# Patient Record
Sex: FEMALE | Race: WHITE | NOT HISPANIC OR LATINO | Employment: OTHER | URBAN - METROPOLITAN AREA
[De-identification: names, ages, dates, MRNs, and addresses within clinical notes are randomized per-mention and may not be internally consistent; named-entity substitution may affect disease eponyms.]

---

## 2018-01-30 ENCOUNTER — CONVERSION ENCOUNTER (OUTPATIENT)
Dept: MAMMOGRAPHY | Facility: HOSPITAL | Age: 78
End: 2018-01-30

## 2018-02-14 ENCOUNTER — OFFICE VISIT CONVERTED (OUTPATIENT)
Dept: OTHER | Facility: HOSPITAL | Age: 78
End: 2018-02-14
Attending: INTERNAL MEDICINE

## 2018-04-20 ENCOUNTER — OFFICE VISIT CONVERTED (OUTPATIENT)
Dept: FAMILY MEDICINE CLINIC | Facility: CLINIC | Age: 78
End: 2018-04-20
Attending: NURSE PRACTITIONER

## 2018-04-20 ENCOUNTER — CONVERSION ENCOUNTER (OUTPATIENT)
Dept: FAMILY MEDICINE CLINIC | Facility: CLINIC | Age: 78
End: 2018-04-20

## 2018-05-18 ENCOUNTER — OFFICE VISIT CONVERTED (OUTPATIENT)
Dept: FAMILY MEDICINE CLINIC | Facility: CLINIC | Age: 78
End: 2018-05-18
Attending: NURSE PRACTITIONER

## 2018-06-25 ENCOUNTER — CONVERSION ENCOUNTER (OUTPATIENT)
Dept: MAMMOGRAPHY | Facility: HOSPITAL | Age: 78
End: 2018-06-25

## 2018-06-25 ENCOUNTER — OFFICE VISIT CONVERTED (OUTPATIENT)
Dept: OTHER | Facility: HOSPITAL | Age: 78
End: 2018-06-25
Attending: INTERNAL MEDICINE

## 2018-07-10 ENCOUNTER — OFFICE VISIT CONVERTED (OUTPATIENT)
Dept: FAMILY MEDICINE CLINIC | Facility: CLINIC | Age: 78
End: 2018-07-10
Attending: NURSE PRACTITIONER

## 2018-10-03 ENCOUNTER — OFFICE VISIT CONVERTED (OUTPATIENT)
Dept: FAMILY MEDICINE CLINIC | Facility: CLINIC | Age: 78
End: 2018-10-03
Attending: NURSE PRACTITIONER

## 2018-12-13 ENCOUNTER — OFFICE VISIT CONVERTED (OUTPATIENT)
Dept: FAMILY MEDICINE CLINIC | Facility: CLINIC | Age: 78
End: 2018-12-13
Attending: NURSE PRACTITIONER

## 2019-01-14 ENCOUNTER — OFFICE VISIT CONVERTED (OUTPATIENT)
Dept: FAMILY MEDICINE CLINIC | Facility: CLINIC | Age: 79
End: 2019-01-14
Attending: NURSE PRACTITIONER

## 2019-03-04 ENCOUNTER — OFFICE VISIT CONVERTED (OUTPATIENT)
Dept: OTHER | Facility: HOSPITAL | Age: 79
End: 2019-03-04
Attending: INTERNAL MEDICINE

## 2019-03-04 ENCOUNTER — HOSPITAL ENCOUNTER (OUTPATIENT)
Dept: OTHER | Facility: HOSPITAL | Age: 79
Discharge: HOME | End: 2019-03-04
Attending: INTERNAL MEDICINE

## 2019-03-04 LAB
ALBUMIN SERPL-MCNC: 4.2 G/DL (ref 3.5–5)
ALBUMIN/GLOB SERPL: 1.2 {RATIO} (ref 1.4–2.6)
ALP SERPL-CCNC: 72 U/L (ref 43–160)
ALT SERPL-CCNC: 10 U/L (ref 10–40)
ANION GAP SERPL CALC-SCNC: 13 MMOL/L (ref 8–19)
AST SERPL-CCNC: 19 U/L (ref 15–50)
BASOPHILS # BLD AUTO: 0.02 10*3/UL (ref 0–0.2)
BASOPHILS NFR BLD AUTO: 0.3 % (ref 0–3)
BILIRUB SERPL-MCNC: 0.41 MG/DL (ref 0.2–1.3)
BUN SERPL-MCNC: 13 MG/DL (ref 5–25)
BUN/CREAT SERPL: 17 {RATIO} (ref 6–20)
CALCIUM SERPL-MCNC: 8.9 MG/DL (ref 8.7–10.4)
CHLORIDE SERPL-SCNC: 99 MMOL/L (ref 99–111)
CONV ABS IMM GRAN: 0.01 10*3/UL (ref 0–0.2)
CONV CO2: 29 MMOL/L (ref 22–32)
CONV IMMATURE GRAN: 0.2 % (ref 0–1.8)
CONV TOTAL PROTEIN: 7.7 G/DL (ref 6.3–8.2)
CREAT UR-MCNC: 0.78 MG/DL (ref 0.5–0.9)
DEPRECATED RDW RBC AUTO: 48.5 FL (ref 36.4–46.3)
EOSINOPHIL # BLD AUTO: 0.17 10*3/UL (ref 0–0.7)
EOSINOPHIL # BLD AUTO: 2.8 % (ref 0–7)
ERYTHROCYTE [DISTWIDTH] IN BLOOD BY AUTOMATED COUNT: 13.5 % (ref 11.7–14.4)
GFR SERPLBLD BASED ON 1.73 SQ M-ARVRAT: >60 ML/MIN/{1.73_M2}
GLOBULIN UR ELPH-MCNC: 3.5 G/DL (ref 2–3.5)
GLUCOSE SERPL-MCNC: 87 MG/DL (ref 65–99)
HBA1C MFR BLD: 12.2 G/DL (ref 12–16)
HCT VFR BLD AUTO: 38 % (ref 37–47)
LYMPHOCYTES # BLD AUTO: 1.86 10*3/UL (ref 1–5)
MCH RBC QN AUTO: 31.2 PG (ref 27–31)
MCHC RBC AUTO-ENTMCNC: 32.1 G/DL (ref 33–37)
MCV RBC AUTO: 97.2 FL (ref 81–99)
MONOCYTES # BLD AUTO: 0.65 10*3/UL (ref 0.2–1.2)
MONOCYTES NFR BLD AUTO: 10.6 % (ref 3–10)
NEUTROPHILS # BLD AUTO: 3.41 10*3/UL (ref 2–8)
NEUTROPHILS NFR BLD AUTO: 55.7 % (ref 30–85)
NRBC CBCN: 0 % (ref 0–0.7)
OSMOLALITY SERPL CALC.SUM OF ELEC: 283 MOSM/KG (ref 273–304)
PLATELET # BLD AUTO: 251 10*3/UL (ref 130–400)
PMV BLD AUTO: 9.8 FL (ref 9.4–12.3)
POTASSIUM SERPL-SCNC: 4.4 MMOL/L (ref 3.5–5.3)
RBC # BLD AUTO: 3.91 10*6/UL (ref 4.2–5.4)
SODIUM SERPL-SCNC: 137 MMOL/L (ref 135–147)
VARIANT LYMPHS NFR BLD MANUAL: 30.4 % (ref 20–45)
WBC # BLD AUTO: 6.12 10*3/UL (ref 4.8–10.8)

## 2019-03-05 LAB — CANCER AG27-29 SERPL-ACNC: 17.6 U/ML (ref 0–38.6)

## 2019-04-01 ENCOUNTER — OFFICE VISIT CONVERTED (OUTPATIENT)
Dept: FAMILY MEDICINE CLINIC | Facility: CLINIC | Age: 79
End: 2019-04-01
Attending: NURSE PRACTITIONER

## 2019-04-01 ENCOUNTER — HOSPITAL ENCOUNTER (OUTPATIENT)
Dept: FAMILY MEDICINE CLINIC | Facility: CLINIC | Age: 79
Discharge: HOME | End: 2019-04-01
Attending: NURSE PRACTITIONER

## 2019-04-01 LAB
ALBUMIN SERPL-MCNC: 4.4 G/DL (ref 3.5–5)
ALBUMIN/GLOB SERPL: 1.2 {RATIO} (ref 1.4–2.6)
ALP SERPL-CCNC: 83 U/L (ref 43–160)
ALT SERPL-CCNC: 10 U/L (ref 10–40)
ANION GAP SERPL CALC-SCNC: 18 MMOL/L (ref 8–19)
APPEARANCE UR: CLEAR
AST SERPL-CCNC: 24 U/L (ref 15–50)
BASOPHILS # BLD AUTO: 0.03 10*3/UL (ref 0–0.2)
BASOPHILS NFR BLD AUTO: 0.4 % (ref 0–3)
BILIRUB SERPL-MCNC: 0.51 MG/DL (ref 0.2–1.3)
BILIRUB UR QL: NEGATIVE
BUN SERPL-MCNC: 14 MG/DL (ref 5–25)
BUN/CREAT SERPL: 15 {RATIO} (ref 6–20)
CALCIUM SERPL-MCNC: 9.5 MG/DL (ref 8.7–10.4)
CHLORIDE SERPL-SCNC: 100 MMOL/L (ref 99–111)
CHOLEST SERPL-MCNC: 186 MG/DL (ref 107–200)
CHOLEST/HDLC SERPL: 4 {RATIO} (ref 3–6)
COLOR UR: YELLOW
CONV ABS IMM GRAN: 0.02 10*3/UL (ref 0–0.2)
CONV BACTERIA: NEGATIVE
CONV CO2: 26 MMOL/L (ref 22–32)
CONV COLLECTION SOURCE (UA): ABNORMAL
CONV HYALINE CASTS IN URINE MICRO: ABNORMAL /[LPF]
CONV IMMATURE GRAN: 0.3 % (ref 0–1.8)
CONV TOTAL PROTEIN: 8.2 G/DL (ref 6.3–8.2)
CONV UROBILINOGEN IN URINE BY AUTOMATED TEST STRIP: 1 {EHRLICHU}/DL (ref 0.1–1)
CREAT UR-MCNC: 0.92 MG/DL (ref 0.5–0.9)
DEPRECATED RDW RBC AUTO: 49.8 FL (ref 36.4–46.3)
EOSINOPHIL # BLD AUTO: 0.18 10*3/UL (ref 0–0.7)
EOSINOPHIL # BLD AUTO: 2.6 % (ref 0–7)
ERYTHROCYTE [DISTWIDTH] IN BLOOD BY AUTOMATED COUNT: 13.5 % (ref 11.7–14.4)
GFR SERPLBLD BASED ON 1.73 SQ M-ARVRAT: >60 ML/MIN/{1.73_M2}
GLOBULIN UR ELPH-MCNC: 3.8 G/DL (ref 2–3.5)
GLUCOSE SERPL-MCNC: 92 MG/DL (ref 65–99)
GLUCOSE UR QL: NEGATIVE MG/DL
HBA1C MFR BLD: 12.4 G/DL (ref 12–16)
HCT VFR BLD AUTO: 40.2 % (ref 37–47)
HDLC SERPL-MCNC: 47 MG/DL (ref 40–60)
HGB UR QL STRIP: NEGATIVE
KETONES UR QL STRIP: NEGATIVE MG/DL
LDLC SERPL CALC-MCNC: 116 MG/DL (ref 70–100)
LEUKOCYTE ESTERASE UR QL STRIP: ABNORMAL
LYMPHOCYTES # BLD AUTO: 2.48 10*3/UL (ref 1–5)
MCH RBC QN AUTO: 30.8 PG (ref 27–31)
MCHC RBC AUTO-ENTMCNC: 30.8 G/DL (ref 33–37)
MCV RBC AUTO: 99.8 FL (ref 81–99)
MONOCYTES # BLD AUTO: 0.58 10*3/UL (ref 0.2–1.2)
MONOCYTES NFR BLD AUTO: 8.4 % (ref 3–10)
NEUTROPHILS # BLD AUTO: 3.62 10*3/UL (ref 2–8)
NEUTROPHILS NFR BLD AUTO: 52.4 % (ref 30–85)
NITRITE UR QL STRIP: NEGATIVE
NRBC CBCN: 0 % (ref 0–0.7)
OSMOLALITY SERPL CALC.SUM OF ELEC: 288 MOSM/KG (ref 273–304)
PH UR STRIP.AUTO: 7 [PH] (ref 5–8)
PLATELET # BLD AUTO: 308 10*3/UL (ref 130–400)
PMV BLD AUTO: 10.3 FL (ref 9.4–12.3)
POTASSIUM SERPL-SCNC: 4.7 MMOL/L (ref 3.5–5.3)
PROT UR QL: NEGATIVE MG/DL
RBC # BLD AUTO: 4.03 10*6/UL (ref 4.2–5.4)
RBC #/AREA URNS HPF: ABNORMAL /[HPF]
SODIUM SERPL-SCNC: 139 MMOL/L (ref 135–147)
SP GR UR: 1.02 (ref 1–1.03)
TRIGL SERPL-MCNC: 116 MG/DL (ref 40–150)
VARIANT LYMPHS NFR BLD MANUAL: 35.9 % (ref 20–45)
VLDLC SERPL-MCNC: 23 MG/DL (ref 5–37)
WBC # BLD AUTO: 6.91 10*3/UL (ref 4.8–10.8)
WBC #/AREA URNS HPF: ABNORMAL /[HPF]

## 2019-05-06 ENCOUNTER — HOSPITAL ENCOUNTER (OUTPATIENT)
Dept: ONCOLOGY | Facility: HOSPITAL | Age: 79
Discharge: HOME | End: 2019-05-06
Attending: INTERNAL MEDICINE

## 2019-05-06 LAB
CALCIUM SERPL-MCNC: 9.5 MG/DL (ref 8.7–10.4)
CREAT UR-MCNC: 0.9 MG/DL (ref 0.5–0.9)
MAGNESIUM SERPL-MCNC: 1.99 MG/DL (ref 1.6–2.3)
PHOSPHATE SERPL-MCNC: 3.1 MG/DL (ref 2.4–4.5)

## 2019-05-09 ENCOUNTER — HOSPITAL ENCOUNTER (OUTPATIENT)
Dept: MAMMOGRAPHY | Facility: HOSPITAL | Age: 79
Discharge: HOME | End: 2019-05-09
Attending: INTERNAL MEDICINE

## 2019-09-03 ENCOUNTER — HOSPITAL ENCOUNTER (OUTPATIENT)
Dept: OTHER | Facility: HOSPITAL | Age: 79
Discharge: HOME | End: 2019-09-03
Attending: INTERNAL MEDICINE

## 2019-09-03 ENCOUNTER — OFFICE VISIT CONVERTED (OUTPATIENT)
Dept: OTHER | Facility: HOSPITAL | Age: 79
End: 2019-09-03
Attending: INTERNAL MEDICINE

## 2019-09-03 LAB
ALBUMIN SERPL-MCNC: 4.2 G/DL (ref 3.5–5)
ALBUMIN/GLOB SERPL: 1.2 {RATIO} (ref 1.4–2.6)
ALP SERPL-CCNC: 78 U/L (ref 43–160)
ALT SERPL-CCNC: 8 U/L (ref 10–40)
ANION GAP SERPL CALC-SCNC: 18 MMOL/L (ref 8–19)
AST SERPL-CCNC: 18 U/L (ref 15–50)
BASOPHILS # BLD AUTO: 0.02 10*3/UL (ref 0–0.2)
BASOPHILS NFR BLD AUTO: 0.3 % (ref 0–3)
BILIRUB SERPL-MCNC: 0.35 MG/DL (ref 0.2–1.3)
BUN SERPL-MCNC: 17 MG/DL (ref 5–25)
BUN/CREAT SERPL: 18 {RATIO} (ref 6–20)
CALCIUM SERPL-MCNC: 9.2 MG/DL (ref 8.7–10.4)
CHLORIDE SERPL-SCNC: 100 MMOL/L (ref 99–111)
CONV ABS IMM GRAN: 0.02 10*3/UL (ref 0–0.2)
CONV CO2: 24 MMOL/L (ref 22–32)
CONV IMMATURE GRAN: 0.3 % (ref 0–1.8)
CONV TOTAL PROTEIN: 7.7 G/DL (ref 6.3–8.2)
CREAT UR-MCNC: 0.94 MG/DL (ref 0.5–0.9)
DEPRECATED RDW RBC AUTO: 48.8 FL (ref 36.4–46.3)
EOSINOPHIL # BLD AUTO: 0.23 10*3/UL (ref 0–0.7)
EOSINOPHIL # BLD AUTO: 3.6 % (ref 0–7)
ERYTHROCYTE [DISTWIDTH] IN BLOOD BY AUTOMATED COUNT: 13.4 % (ref 11.7–14.4)
GFR SERPLBLD BASED ON 1.73 SQ M-ARVRAT: 58 ML/MIN/{1.73_M2}
GLOBULIN UR ELPH-MCNC: 3.5 G/DL (ref 2–3.5)
GLUCOSE SERPL-MCNC: 110 MG/DL (ref 65–99)
HCT VFR BLD AUTO: 36 % (ref 37–47)
HGB BLD-MCNC: 11.4 G/DL (ref 12–16)
LYMPHOCYTES # BLD AUTO: 2.02 10*3/UL (ref 1–5)
LYMPHOCYTES NFR BLD AUTO: 31.8 % (ref 20–45)
MCH RBC QN AUTO: 31.2 PG (ref 27–31)
MCHC RBC AUTO-ENTMCNC: 31.7 G/DL (ref 33–37)
MCV RBC AUTO: 98.6 FL (ref 81–99)
MONOCYTES # BLD AUTO: 0.73 10*3/UL (ref 0.2–1.2)
MONOCYTES NFR BLD AUTO: 11.5 % (ref 3–10)
NEUTROPHILS # BLD AUTO: 3.33 10*3/UL (ref 2–8)
NEUTROPHILS NFR BLD AUTO: 52.5 % (ref 30–85)
NRBC CBCN: 0 % (ref 0–0.7)
OSMOLALITY SERPL CALC.SUM OF ELEC: 288 MOSM/KG (ref 273–304)
PLATELET # BLD AUTO: 249 10*3/UL (ref 130–400)
PMV BLD AUTO: 10.1 FL (ref 9.4–12.3)
POTASSIUM SERPL-SCNC: 4.1 MMOL/L (ref 3.5–5.3)
RBC # BLD AUTO: 3.65 10*6/UL (ref 4.2–5.4)
SODIUM SERPL-SCNC: 138 MMOL/L (ref 135–147)
WBC # BLD AUTO: 6.35 10*3/UL (ref 4.8–10.8)

## 2019-09-04 LAB — CANCER AG27-29 SERPL-ACNC: 17.6 U/ML (ref 0–38.6)

## 2019-09-26 ENCOUNTER — HOSPITAL ENCOUNTER (OUTPATIENT)
Dept: FAMILY MEDICINE CLINIC | Facility: CLINIC | Age: 79
Discharge: HOME | End: 2019-09-26
Attending: NURSE PRACTITIONER

## 2019-09-26 ENCOUNTER — OFFICE VISIT CONVERTED (OUTPATIENT)
Dept: FAMILY MEDICINE CLINIC | Facility: CLINIC | Age: 79
End: 2019-09-26
Attending: NURSE PRACTITIONER

## 2019-09-26 LAB
ALBUMIN SERPL-MCNC: 4.5 G/DL (ref 3.5–5)
ALBUMIN/GLOB SERPL: 1.3 {RATIO} (ref 1.4–2.6)
ALP SERPL-CCNC: 79 U/L (ref 43–160)
ALT SERPL-CCNC: 8 U/L (ref 10–40)
ANION GAP SERPL CALC-SCNC: 22 MMOL/L (ref 8–19)
APPEARANCE UR: CLEAR
AST SERPL-CCNC: 22 U/L (ref 15–50)
BILIRUB SERPL-MCNC: 0.58 MG/DL (ref 0.2–1.3)
BILIRUB UR QL: NEGATIVE
BUN SERPL-MCNC: 15 MG/DL (ref 5–25)
BUN/CREAT SERPL: 17 {RATIO} (ref 6–20)
CALCIUM SERPL-MCNC: 9.7 MG/DL (ref 8.7–10.4)
CHLORIDE SERPL-SCNC: 93 MMOL/L (ref 99–111)
CHOLEST SERPL-MCNC: 172 MG/DL (ref 107–200)
CHOLEST/HDLC SERPL: 4 {RATIO} (ref 3–6)
COLOR UR: YELLOW
CONV BACTERIA: NEGATIVE
CONV CO2: 23 MMOL/L (ref 22–32)
CONV COLLECTION SOURCE (UA): ABNORMAL
CONV TOTAL PROTEIN: 8 G/DL (ref 6.3–8.2)
CONV UROBILINOGEN IN URINE BY AUTOMATED TEST STRIP: 0.2 {EHRLICHU}/DL (ref 0.1–1)
CREAT UR-MCNC: 0.87 MG/DL (ref 0.5–0.9)
GFR SERPLBLD BASED ON 1.73 SQ M-ARVRAT: >60 ML/MIN/{1.73_M2}
GLOBULIN UR ELPH-MCNC: 3.5 G/DL (ref 2–3.5)
GLUCOSE SERPL-MCNC: 98 MG/DL (ref 65–99)
GLUCOSE UR QL: NEGATIVE MG/DL
HDLC SERPL-MCNC: 43 MG/DL (ref 40–60)
HGB UR QL STRIP: NEGATIVE
KETONES UR QL STRIP: NEGATIVE MG/DL
LDLC SERPL CALC-MCNC: 95 MG/DL (ref 70–100)
LEUKOCYTE ESTERASE UR QL STRIP: ABNORMAL
NITRITE UR QL STRIP: NEGATIVE
OSMOLALITY SERPL CALC.SUM OF ELEC: 277 MOSM/KG (ref 273–304)
PH UR STRIP.AUTO: 8 [PH] (ref 5–8)
POTASSIUM SERPL-SCNC: 4.9 MMOL/L (ref 3.5–5.3)
PROT UR QL: NEGATIVE MG/DL
RBC #/AREA URNS HPF: ABNORMAL /[HPF]
SODIUM SERPL-SCNC: 133 MMOL/L (ref 135–147)
SP GR UR: 1.01 (ref 1–1.03)
TRIGL SERPL-MCNC: 170 MG/DL (ref 40–150)
TSH SERPL-ACNC: 2.61 M[IU]/L (ref 0.27–4.2)
VLDLC SERPL-MCNC: 34 MG/DL (ref 5–37)
WBC #/AREA URNS HPF: ABNORMAL /[HPF]

## 2019-11-07 ENCOUNTER — HOSPITAL ENCOUNTER (OUTPATIENT)
Dept: ONCOLOGY | Facility: HOSPITAL | Age: 79
Discharge: HOME | End: 2019-11-07
Attending: INTERNAL MEDICINE

## 2019-11-07 LAB
ALBUMIN SERPL-MCNC: 4.2 G/DL (ref 3.5–5)
ALBUMIN/GLOB SERPL: 1.2 {RATIO} (ref 1.4–2.6)
ALP SERPL-CCNC: 74 U/L (ref 43–160)
ALT SERPL-CCNC: 10 U/L (ref 10–40)
ANION GAP SERPL CALC-SCNC: 17 MMOL/L (ref 8–19)
AST SERPL-CCNC: 23 U/L (ref 15–50)
BASOPHILS # BLD AUTO: 0.02 10*3/UL (ref 0–0.2)
BASOPHILS NFR BLD AUTO: 0.4 % (ref 0–3)
BILIRUB SERPL-MCNC: 0.3 MG/DL (ref 0.2–1.3)
BUN SERPL-MCNC: 15 MG/DL (ref 5–25)
BUN/CREAT SERPL: 17 {RATIO} (ref 6–20)
CALCIUM SERPL-MCNC: 9.7 MG/DL (ref 8.7–10.4)
CHLORIDE SERPL-SCNC: 97 MMOL/L (ref 99–111)
CONV ABS IMM GRAN: 0.01 10*3/UL (ref 0–0.2)
CONV CO2: 26 MMOL/L (ref 22–32)
CONV IMMATURE GRAN: 0.2 % (ref 0–1.8)
CONV TOTAL PROTEIN: 7.8 G/DL (ref 6.3–8.2)
CREAT UR-MCNC: 0.89 MG/DL (ref 0.5–0.9)
DEPRECATED RDW RBC AUTO: 47.5 FL (ref 36.4–46.3)
EOSINOPHIL # BLD AUTO: 0.18 10*3/UL (ref 0–0.7)
EOSINOPHIL # BLD AUTO: 3.3 % (ref 0–7)
ERYTHROCYTE [DISTWIDTH] IN BLOOD BY AUTOMATED COUNT: 13.2 % (ref 11.7–14.4)
GFR SERPLBLD BASED ON 1.73 SQ M-ARVRAT: >60 ML/MIN/{1.73_M2}
GLOBULIN UR ELPH-MCNC: 3.6 G/DL (ref 2–3.5)
GLUCOSE SERPL-MCNC: 140 MG/DL (ref 65–99)
HCT VFR BLD AUTO: 37.2 % (ref 37–47)
HGB BLD-MCNC: 11.6 G/DL (ref 12–16)
LYMPHOCYTES # BLD AUTO: 1.82 10*3/UL (ref 1–5)
LYMPHOCYTES NFR BLD AUTO: 33.8 % (ref 20–45)
MAGNESIUM SERPL-MCNC: 2.12 MG/DL (ref 1.6–2.3)
MCH RBC QN AUTO: 30.8 PG (ref 27–31)
MCHC RBC AUTO-ENTMCNC: 31.2 G/DL (ref 33–37)
MCV RBC AUTO: 98.7 FL (ref 81–99)
MONOCYTES # BLD AUTO: 0.61 10*3/UL (ref 0.2–1.2)
MONOCYTES NFR BLD AUTO: 11.3 % (ref 3–10)
NEUTROPHILS # BLD AUTO: 2.75 10*3/UL (ref 2–8)
NEUTROPHILS NFR BLD AUTO: 51 % (ref 30–85)
NRBC CBCN: 0 % (ref 0–0.7)
OSMOLALITY SERPL CALC.SUM OF ELEC: 283 MOSM/KG (ref 273–304)
PHOSPHATE SERPL-MCNC: 3.3 MG/DL (ref 2.4–4.5)
PLATELET # BLD AUTO: 258 10*3/UL (ref 130–400)
PMV BLD AUTO: 10 FL (ref 9.4–12.3)
POTASSIUM SERPL-SCNC: 4.5 MMOL/L (ref 3.5–5.3)
RBC # BLD AUTO: 3.77 10*6/UL (ref 4.2–5.4)
SODIUM SERPL-SCNC: 135 MMOL/L (ref 135–147)
WBC # BLD AUTO: 5.39 10*3/UL (ref 4.8–10.8)

## 2020-05-01 ENCOUNTER — OFFICE VISIT CONVERTED (OUTPATIENT)
Dept: FAMILY MEDICINE CLINIC | Facility: CLINIC | Age: 80
End: 2020-05-01
Attending: FAMILY MEDICINE

## 2020-05-08 ENCOUNTER — HOSPITAL ENCOUNTER (OUTPATIENT)
Dept: OTHER | Facility: HOSPITAL | Age: 80
Discharge: HOME | End: 2020-05-08
Attending: INTERNAL MEDICINE

## 2020-05-08 LAB
ALBUMIN SERPL-MCNC: 4.1 G/DL (ref 3.5–5)
ALBUMIN/GLOB SERPL: 1.1 {RATIO} (ref 1.4–2.6)
ALP SERPL-CCNC: 83 U/L (ref 43–160)
ALT SERPL-CCNC: 7 U/L (ref 10–40)
ANION GAP SERPL CALC-SCNC: 16 MMOL/L (ref 8–19)
AST SERPL-CCNC: 18 U/L (ref 15–50)
BASOPHILS # BLD AUTO: 0.03 10*3/UL (ref 0–0.2)
BASOPHILS NFR BLD AUTO: 0.4 % (ref 0–3)
BILIRUB SERPL-MCNC: 0.56 MG/DL (ref 0.2–1.3)
BUN SERPL-MCNC: 15 MG/DL (ref 5–25)
BUN/CREAT SERPL: 19 {RATIO} (ref 6–20)
CALCIUM SERPL-MCNC: 9.7 MG/DL (ref 8.7–10.4)
CHLORIDE SERPL-SCNC: 95 MMOL/L (ref 99–111)
CONV ABS IMM GRAN: 0.02 10*3/UL (ref 0–0.2)
CONV CO2: 28 MMOL/L (ref 22–32)
CONV IMMATURE GRAN: 0.3 % (ref 0–1.8)
CONV TOTAL PROTEIN: 7.9 G/DL (ref 6.3–8.2)
CREAT UR-MCNC: 0.81 MG/DL (ref 0.5–0.9)
DEPRECATED RDW RBC AUTO: 48.1 FL (ref 36.4–46.3)
EOSINOPHIL # BLD AUTO: 0.22 10*3/UL (ref 0–0.7)
EOSINOPHIL # BLD AUTO: 3 % (ref 0–7)
ERYTHROCYTE [DISTWIDTH] IN BLOOD BY AUTOMATED COUNT: 13.5 % (ref 11.7–14.4)
GFR SERPLBLD BASED ON 1.73 SQ M-ARVRAT: >60 ML/MIN/{1.73_M2}
GLOBULIN UR ELPH-MCNC: 3.8 G/DL (ref 2–3.5)
GLUCOSE SERPL-MCNC: 98 MG/DL (ref 65–99)
HCT VFR BLD AUTO: 37.4 % (ref 37–47)
HGB BLD-MCNC: 11.9 G/DL (ref 12–16)
LYMPHOCYTES # BLD AUTO: 2.29 10*3/UL (ref 1–5)
LYMPHOCYTES NFR BLD AUTO: 31.3 % (ref 20–45)
MAGNESIUM SERPL-MCNC: 2.03 MG/DL (ref 1.6–2.3)
MCH RBC QN AUTO: 30.7 PG (ref 27–31)
MCHC RBC AUTO-ENTMCNC: 31.8 G/DL (ref 33–37)
MCV RBC AUTO: 96.4 FL (ref 81–99)
MONOCYTES # BLD AUTO: 0.66 10*3/UL (ref 0.2–1.2)
MONOCYTES NFR BLD AUTO: 9 % (ref 3–10)
NEUTROPHILS # BLD AUTO: 4.09 10*3/UL (ref 2–8)
NEUTROPHILS NFR BLD AUTO: 56 % (ref 30–85)
NRBC CBCN: 0 % (ref 0–0.7)
OSMOLALITY SERPL CALC.SUM OF ELEC: 281 MOSM/KG (ref 273–304)
PHOSPHATE SERPL-MCNC: 3.6 MG/DL (ref 2.4–4.5)
PLATELET # BLD AUTO: 276 10*3/UL (ref 130–400)
PMV BLD AUTO: 9.7 FL (ref 9.4–12.3)
POTASSIUM SERPL-SCNC: 4.2 MMOL/L (ref 3.5–5.3)
RBC # BLD AUTO: 3.88 10*6/UL (ref 4.2–5.4)
SODIUM SERPL-SCNC: 135 MMOL/L (ref 135–147)
WBC # BLD AUTO: 7.31 10*3/UL (ref 4.8–10.8)

## 2020-06-15 ENCOUNTER — OFFICE VISIT CONVERTED (OUTPATIENT)
Dept: OTHER | Facility: HOSPITAL | Age: 80
End: 2020-06-15
Attending: INTERNAL MEDICINE

## 2020-06-19 ENCOUNTER — HOSPITAL ENCOUNTER (OUTPATIENT)
Dept: FAMILY MEDICINE CLINIC | Facility: CLINIC | Age: 80
Discharge: HOME | End: 2020-06-19
Attending: NURSE PRACTITIONER

## 2020-06-19 ENCOUNTER — OFFICE VISIT CONVERTED (OUTPATIENT)
Dept: FAMILY MEDICINE CLINIC | Facility: CLINIC | Age: 80
End: 2020-06-19
Attending: NURSE PRACTITIONER

## 2020-06-19 ENCOUNTER — CONVERSION ENCOUNTER (OUTPATIENT)
Dept: FAMILY MEDICINE CLINIC | Facility: CLINIC | Age: 80
End: 2020-06-19

## 2020-06-20 LAB
ALBUMIN SERPL-MCNC: 4.4 G/DL (ref 3.5–5)
ALBUMIN/GLOB SERPL: 1.3 {RATIO} (ref 1.4–2.6)
ALP SERPL-CCNC: 72 U/L (ref 43–160)
ALT SERPL-CCNC: 10 U/L (ref 10–40)
ANION GAP SERPL CALC-SCNC: 16 MMOL/L (ref 8–19)
AST SERPL-CCNC: 24 U/L (ref 15–50)
BASOPHILS # BLD AUTO: 0.03 10*3/UL (ref 0–0.2)
BASOPHILS NFR BLD AUTO: 0.4 % (ref 0–3)
BILIRUB SERPL-MCNC: 0.49 MG/DL (ref 0.2–1.3)
BUN SERPL-MCNC: 13 MG/DL (ref 5–25)
BUN/CREAT SERPL: 16 {RATIO} (ref 6–20)
CALCIUM SERPL-MCNC: 9.6 MG/DL (ref 8.7–10.4)
CHLORIDE SERPL-SCNC: 96 MMOL/L (ref 99–111)
CHOLEST SERPL-MCNC: 168 MG/DL (ref 107–200)
CHOLEST/HDLC SERPL: 4.4 {RATIO} (ref 3–6)
CONV ABS IMM GRAN: 0.01 10*3/UL (ref 0–0.2)
CONV CO2: 25 MMOL/L (ref 22–32)
CONV IMMATURE GRAN: 0.1 % (ref 0–1.8)
CONV TOTAL PROTEIN: 7.7 G/DL (ref 6.3–8.2)
CREAT UR-MCNC: 0.82 MG/DL (ref 0.5–0.9)
DEPRECATED RDW RBC AUTO: 48.6 FL (ref 36.4–46.3)
EOSINOPHIL # BLD AUTO: 0.17 10*3/UL (ref 0–0.7)
EOSINOPHIL # BLD AUTO: 2.5 % (ref 0–7)
ERYTHROCYTE [DISTWIDTH] IN BLOOD BY AUTOMATED COUNT: 13.5 % (ref 11.7–14.4)
GFR SERPLBLD BASED ON 1.73 SQ M-ARVRAT: >60 ML/MIN/{1.73_M2}
GLOBULIN UR ELPH-MCNC: 3.3 G/DL (ref 2–3.5)
GLUCOSE SERPL-MCNC: 85 MG/DL (ref 65–99)
HCT VFR BLD AUTO: 37.7 % (ref 37–47)
HDLC SERPL-MCNC: 38 MG/DL (ref 40–60)
HGB BLD-MCNC: 11.9 G/DL (ref 12–16)
LDLC SERPL CALC-MCNC: 101 MG/DL (ref 70–100)
LYMPHOCYTES # BLD AUTO: 2.59 10*3/UL (ref 1–5)
LYMPHOCYTES NFR BLD AUTO: 37.6 % (ref 20–45)
MCH RBC QN AUTO: 30.9 PG (ref 27–31)
MCHC RBC AUTO-ENTMCNC: 31.6 G/DL (ref 33–37)
MCV RBC AUTO: 97.9 FL (ref 81–99)
MONOCYTES # BLD AUTO: 0.63 10*3/UL (ref 0.2–1.2)
MONOCYTES NFR BLD AUTO: 9.2 % (ref 3–10)
NEUTROPHILS # BLD AUTO: 3.45 10*3/UL (ref 2–8)
NEUTROPHILS NFR BLD AUTO: 50.2 % (ref 30–85)
NRBC CBCN: 0 % (ref 0–0.7)
OSMOLALITY SERPL CALC.SUM OF ELEC: 273 MOSM/KG (ref 273–304)
PLATELET # BLD AUTO: 274 10*3/UL (ref 130–400)
PMV BLD AUTO: 10.5 FL (ref 9.4–12.3)
POTASSIUM SERPL-SCNC: 5.3 MMOL/L (ref 3.5–5.3)
RBC # BLD AUTO: 3.85 10*6/UL (ref 4.2–5.4)
SODIUM SERPL-SCNC: 132 MMOL/L (ref 135–147)
TRIGL SERPL-MCNC: 147 MG/DL (ref 40–150)
VLDLC SERPL-MCNC: 29 MG/DL (ref 5–37)
WBC # BLD AUTO: 6.88 10*3/UL (ref 4.8–10.8)

## 2020-08-28 ENCOUNTER — HOSPITAL ENCOUNTER (OUTPATIENT)
Dept: MAMMOGRAPHY | Facility: HOSPITAL | Age: 80
Discharge: HOME | End: 2020-08-28
Attending: INTERNAL MEDICINE

## 2020-10-27 ENCOUNTER — CONVERSION ENCOUNTER (OUTPATIENT)
Dept: FAMILY MEDICINE CLINIC | Facility: CLINIC | Age: 80
End: 2020-10-27

## 2020-10-27 ENCOUNTER — HOSPITAL ENCOUNTER (OUTPATIENT)
Dept: FAMILY MEDICINE CLINIC | Facility: CLINIC | Age: 80
Discharge: HOME | End: 2020-10-27
Attending: NURSE PRACTITIONER

## 2020-10-27 ENCOUNTER — OFFICE VISIT CONVERTED (OUTPATIENT)
Dept: FAMILY MEDICINE CLINIC | Facility: CLINIC | Age: 80
End: 2020-10-27
Attending: NURSE PRACTITIONER

## 2020-10-27 LAB
APPEARANCE UR: CLEAR
BASOPHILS # BLD AUTO: 0.03 10*3/UL (ref 0–0.2)
BASOPHILS NFR BLD AUTO: 0.5 % (ref 0–3)
BILIRUB UR QL: NEGATIVE
COLOR UR: YELLOW
CONV ABS IMM GRAN: 0.02 10*3/UL (ref 0–0.2)
CONV BACTERIA: NEGATIVE
CONV COLLECTION SOURCE (UA): ABNORMAL
CONV HYALINE CASTS IN URINE MICRO: ABNORMAL /[LPF]
CONV IMMATURE GRAN: 0.3 % (ref 0–1.8)
CONV UROBILINOGEN IN URINE BY AUTOMATED TEST STRIP: 0.2 {EHRLICHU}/DL (ref 0.1–1)
DEPRECATED RDW RBC AUTO: 45.2 FL (ref 36.4–46.3)
EOSINOPHIL # BLD AUTO: 0.13 10*3/UL (ref 0–0.7)
EOSINOPHIL # BLD AUTO: 2 % (ref 0–7)
ERYTHROCYTE [DISTWIDTH] IN BLOOD BY AUTOMATED COUNT: 13.2 % (ref 11.7–14.4)
GLUCOSE UR QL: NEGATIVE MG/DL
HCT VFR BLD AUTO: 38.3 % (ref 37–47)
HGB BLD-MCNC: 12.3 G/DL (ref 12–16)
HGB UR QL STRIP: NEGATIVE
KETONES UR QL STRIP: NEGATIVE MG/DL
LEUKOCYTE ESTERASE UR QL STRIP: ABNORMAL
LYMPHOCYTES # BLD AUTO: 2 10*3/UL (ref 1–5)
LYMPHOCYTES NFR BLD AUTO: 31.3 % (ref 20–45)
MCH RBC QN AUTO: 30.1 PG (ref 27–31)
MCHC RBC AUTO-ENTMCNC: 32.1 G/DL (ref 33–37)
MCV RBC AUTO: 93.9 FL (ref 81–99)
MONOCYTES # BLD AUTO: 0.49 10*3/UL (ref 0.2–1.2)
MONOCYTES NFR BLD AUTO: 7.7 % (ref 3–10)
NEUTROPHILS # BLD AUTO: 3.73 10*3/UL (ref 2–8)
NEUTROPHILS NFR BLD AUTO: 58.2 % (ref 30–85)
NITRITE UR QL STRIP: NEGATIVE
NRBC CBCN: 0 % (ref 0–0.7)
PH UR STRIP.AUTO: 8 [PH] (ref 5–8)
PLATELET # BLD AUTO: 295 10*3/UL (ref 130–400)
PMV BLD AUTO: 9.7 FL (ref 9.4–12.3)
PROT UR QL: NEGATIVE MG/DL
RBC # BLD AUTO: 4.08 10*6/UL (ref 4.2–5.4)
RBC #/AREA URNS HPF: ABNORMAL /[HPF]
SP GR UR: 1.01 (ref 1–1.03)
WBC # BLD AUTO: 6.4 10*3/UL (ref 4.8–10.8)
WBC #/AREA URNS HPF: ABNORMAL /[HPF]

## 2020-10-28 LAB
ALBUMIN SERPL-MCNC: 4.5 G/DL (ref 3.5–5)
ALBUMIN/GLOB SERPL: 1.2 {RATIO} (ref 1.4–2.6)
ALP SERPL-CCNC: 84 U/L (ref 43–160)
ALT SERPL-CCNC: 11 U/L (ref 10–40)
ANION GAP SERPL CALC-SCNC: 18 MMOL/L (ref 8–19)
AST SERPL-CCNC: 27 U/L (ref 15–50)
BILIRUB SERPL-MCNC: 0.42 MG/DL (ref 0.2–1.3)
BUN SERPL-MCNC: 14 MG/DL (ref 5–25)
BUN/CREAT SERPL: 17 {RATIO} (ref 6–20)
CALCIUM SERPL-MCNC: 9.7 MG/DL (ref 8.7–10.4)
CHLORIDE SERPL-SCNC: 91 MMOL/L (ref 99–111)
CHOLEST SERPL-MCNC: 186 MG/DL (ref 107–200)
CHOLEST/HDLC SERPL: 4.9 {RATIO} (ref 3–6)
CONV CO2: 24 MMOL/L (ref 22–32)
CONV TOTAL PROTEIN: 8.2 G/DL (ref 6.3–8.2)
CREAT UR-MCNC: 0.83 MG/DL (ref 0.5–0.9)
GFR SERPLBLD BASED ON 1.73 SQ M-ARVRAT: >60 ML/MIN/{1.73_M2}
GLOBULIN UR ELPH-MCNC: 3.7 G/DL (ref 2–3.5)
GLUCOSE SERPL-MCNC: 87 MG/DL (ref 65–99)
HDLC SERPL-MCNC: 38 MG/DL (ref 40–60)
LDLC SERPL CALC-MCNC: 97 MG/DL (ref 70–100)
MAGNESIUM SERPL-MCNC: 2.2 MG/DL (ref 1.6–2.3)
OSMOLALITY SERPL CALC.SUM OF ELEC: 266 MOSM/KG (ref 273–304)
POTASSIUM SERPL-SCNC: 4.9 MMOL/L (ref 3.5–5.3)
SODIUM SERPL-SCNC: 128 MMOL/L (ref 135–147)
TRIGL SERPL-MCNC: 255 MG/DL (ref 40–150)
TSH SERPL-ACNC: 3.34 M[IU]/L (ref 0.27–4.2)
VLDLC SERPL-MCNC: 51 MG/DL (ref 5–37)

## 2020-10-29 LAB — URATE SERPL-MCNC: 4.2 MG/DL (ref 2.5–7.5)

## 2020-11-02 ENCOUNTER — HOSPITAL ENCOUNTER (OUTPATIENT)
Dept: FAMILY MEDICINE CLINIC | Facility: CLINIC | Age: 80
Discharge: HOME | End: 2020-11-02
Attending: NURSE PRACTITIONER

## 2020-11-02 LAB
OSMOLALITY UR: 256 MOSM/KG (ref 50–1400)
SODIUM 24H UR-SRATE: 45 MMOL/L
SODIUM 24H UR-SRATE: 74 MMOL/(24.H) (ref 40–220)
SPECIMEN VOL 24H UR: 1650 ML

## 2020-11-24 ENCOUNTER — HOSPITAL ENCOUNTER (OUTPATIENT)
Dept: FAMILY MEDICINE CLINIC | Facility: CLINIC | Age: 80
Discharge: HOME | End: 2020-11-24
Attending: NURSE PRACTITIONER

## 2020-11-24 LAB — 25(OH)D3 SERPL-MCNC: 45.4 NG/ML (ref 30–100)

## 2020-11-25 LAB
ANION GAP SERPL CALC-SCNC: 18 MMOL/L (ref 8–19)
BUN SERPL-MCNC: 12 MG/DL (ref 5–25)
BUN/CREAT SERPL: 12 {RATIO} (ref 6–20)
CALCIUM SERPL-MCNC: 10.4 MG/DL (ref 8.7–10.4)
CHLORIDE SERPL-SCNC: 96 MMOL/L (ref 99–111)
CONV CO2: 26 MMOL/L (ref 22–32)
CREAT UR-MCNC: 0.97 MG/DL (ref 0.5–0.9)
GFR SERPLBLD BASED ON 1.73 SQ M-ARVRAT: 56 ML/MIN/{1.73_M2}
GLUCOSE SERPL-MCNC: 87 MG/DL (ref 65–99)
MAGNESIUM SERPL-MCNC: 2.01 MG/DL (ref 1.6–2.3)
OSMOLALITY SERPL CALC.SUM OF ELEC: 279 MOSM/KG (ref 273–304)
PHOSPHATE SERPL-MCNC: 4 MG/DL (ref 2.4–4.5)
POTASSIUM SERPL-SCNC: 4.8 MMOL/L (ref 3.5–5.3)
SODIUM SERPL-SCNC: 135 MMOL/L (ref 135–147)

## 2020-12-07 ENCOUNTER — HOSPITAL ENCOUNTER (OUTPATIENT)
Dept: FAMILY MEDICINE CLINIC | Facility: CLINIC | Age: 80
Discharge: HOME | End: 2020-12-07
Attending: FAMILY MEDICINE

## 2020-12-09 LAB — SARS-COV-2 RNA SPEC QL NAA+PROBE: NOT DETECTED

## 2020-12-18 ENCOUNTER — HOSPITAL ENCOUNTER (OUTPATIENT)
Dept: ONCOLOGY | Facility: HOSPITAL | Age: 80
Discharge: HOME | End: 2020-12-18
Attending: INTERNAL MEDICINE

## 2020-12-18 ENCOUNTER — OFFICE VISIT CONVERTED (OUTPATIENT)
Dept: ONCOLOGY | Facility: HOSPITAL | Age: 80
End: 2020-12-18
Attending: INTERNAL MEDICINE

## 2020-12-18 LAB
CALCIUM SERPL-MCNC: 10 MG/DL (ref 8.7–10.4)
CREAT UR-MCNC: 0.81 MG/DL (ref 0.5–0.9)
MAGNESIUM SERPL-MCNC: 1.94 MG/DL (ref 1.6–2.3)
PHOSPHATE SERPL-MCNC: 3.9 MG/DL (ref 2.4–4.5)

## 2021-03-04 ENCOUNTER — OFFICE VISIT CONVERTED (OUTPATIENT)
Dept: FAMILY MEDICINE CLINIC | Facility: CLINIC | Age: 81
End: 2021-03-04
Attending: NURSE PRACTITIONER

## 2021-03-04 ENCOUNTER — HOSPITAL ENCOUNTER (OUTPATIENT)
Dept: FAMILY MEDICINE CLINIC | Facility: CLINIC | Age: 81
Discharge: HOME | End: 2021-03-04
Attending: NURSE PRACTITIONER

## 2021-03-04 LAB
FOLATE SERPL-MCNC: >20 NG/ML (ref 4.8–20)
VIT B12 SERPL-MCNC: 513 PG/ML (ref 211–911)

## 2021-03-06 ENCOUNTER — HOSPITAL ENCOUNTER (OUTPATIENT)
Dept: FAMILY MEDICINE CLINIC | Facility: CLINIC | Age: 81
Discharge: HOME | End: 2021-03-06
Attending: FAMILY MEDICINE

## 2021-03-06 ENCOUNTER — OFFICE VISIT CONVERTED (OUTPATIENT)
Dept: FAMILY MEDICINE CLINIC | Facility: CLINIC | Age: 81
End: 2021-03-06
Attending: FAMILY MEDICINE

## 2021-03-06 LAB
BACTERIA UR CULT: ABNORMAL
IRON SATN MFR SERPL: 10 % (ref 20–55)
IRON SERPL-MCNC: 40 UG/DL (ref 60–170)
TIBC SERPL-MCNC: 385 UG/DL (ref 245–450)
TRANSFERRIN SERPL-MCNC: 269 MG/DL (ref 250–380)

## 2021-03-08 LAB — BACTERIA SPEC AEROBE CULT: NORMAL

## 2021-03-18 ENCOUNTER — OFFICE VISIT CONVERTED (OUTPATIENT)
Dept: FAMILY MEDICINE CLINIC | Facility: CLINIC | Age: 81
End: 2021-03-18
Attending: FAMILY MEDICINE

## 2021-03-18 ENCOUNTER — CONVERSION ENCOUNTER (OUTPATIENT)
Dept: FAMILY MEDICINE CLINIC | Facility: CLINIC | Age: 81
End: 2021-03-18

## 2021-03-18 ENCOUNTER — HOSPITAL ENCOUNTER (OUTPATIENT)
Dept: FAMILY MEDICINE CLINIC | Facility: CLINIC | Age: 81
Discharge: HOME | End: 2021-03-18
Attending: FAMILY MEDICINE

## 2021-03-19 LAB
BASOPHILS # BLD AUTO: 0.02 10*3/UL (ref 0–0.2)
BASOPHILS NFR BLD AUTO: 0.3 % (ref 0–3)
CONV ABS IMM GRAN: 0.01 10*3/UL (ref 0–0.2)
CONV IMMATURE GRAN: 0.1 % (ref 0–1.8)
DEPRECATED RDW RBC AUTO: 45.6 FL (ref 36.4–46.3)
EOSINOPHIL # BLD AUTO: 0.25 10*3/UL (ref 0–0.7)
EOSINOPHIL # BLD AUTO: 3.3 % (ref 0–7)
ERYTHROCYTE [DISTWIDTH] IN BLOOD BY AUTOMATED COUNT: 13 % (ref 11.7–14.4)
HCT VFR BLD AUTO: 35.5 % (ref 37–47)
HGB BLD-MCNC: 11.6 G/DL (ref 12–16)
LYMPHOCYTES # BLD AUTO: 2.64 10*3/UL (ref 1–5)
LYMPHOCYTES NFR BLD AUTO: 35.1 % (ref 20–45)
MCH RBC QN AUTO: 31.3 PG (ref 27–31)
MCHC RBC AUTO-ENTMCNC: 32.7 G/DL (ref 33–37)
MCV RBC AUTO: 95.7 FL (ref 81–99)
MONOCYTES # BLD AUTO: 0.69 10*3/UL (ref 0.2–1.2)
MONOCYTES NFR BLD AUTO: 9.2 % (ref 3–10)
NEUTROPHILS # BLD AUTO: 3.92 10*3/UL (ref 2–8)
NEUTROPHILS NFR BLD AUTO: 52 % (ref 30–85)
NRBC CBCN: 0 % (ref 0–0.7)
PLATELET # BLD AUTO: 272 10*3/UL (ref 130–400)
PMV BLD AUTO: 10.4 FL (ref 9.4–12.3)
RBC # BLD AUTO: 3.71 10*6/UL (ref 4.2–5.4)
WBC # BLD AUTO: 7.53 10*3/UL (ref 4.8–10.8)

## 2021-03-22 ENCOUNTER — HOSPITAL ENCOUNTER (OUTPATIENT)
Dept: FAMILY MEDICINE CLINIC | Facility: CLINIC | Age: 81
Discharge: HOME | End: 2021-03-22
Attending: FAMILY MEDICINE

## 2021-03-26 ENCOUNTER — OFFICE VISIT CONVERTED (OUTPATIENT)
Dept: FAMILY MEDICINE CLINIC | Facility: CLINIC | Age: 81
End: 2021-03-26
Attending: FAMILY MEDICINE

## 2021-04-08 ENCOUNTER — HOSPITAL ENCOUNTER (OUTPATIENT)
Dept: FAMILY MEDICINE CLINIC | Facility: CLINIC | Age: 81
Discharge: HOME | End: 2021-04-08
Attending: NURSE PRACTITIONER

## 2021-04-08 ENCOUNTER — OFFICE VISIT CONVERTED (OUTPATIENT)
Dept: FAMILY MEDICINE CLINIC | Facility: CLINIC | Age: 81
End: 2021-04-08
Attending: NURSE PRACTITIONER

## 2021-04-22 ENCOUNTER — OFFICE VISIT CONVERTED (OUTPATIENT)
Dept: PODIATRY | Facility: CLINIC | Age: 81
End: 2021-04-22
Attending: PODIATRIST

## 2021-05-07 NOTE — PROGRESS NOTES
Progress Note      Patient Name: Cristiane Bishop   Patient ID: [id number]   Sex: Female   YOB: 1940        Visit Date: January 14, 2019    Provider: ORLIN Calvillo   Location: [facility]   Location Address: [address] [place] [address]  [id number]   Location Phone: [phone]          Chief Complaint     cough and drainage       History Of Present Illness  Cristiane Bishop is a 78 year old /White female who presents for evaluation and treatment of:      she went to [place] and got the flu while she was there--they gave her Tamiflu in [place] and then she still didn't feel well, so she called here and Dr. Rivera sent her in a Zpak.     She still has a lot of drainage in the back of her throat; it seems like every time she swallows it gets stuck in the back of her throat. She is not blowing anything out of her nose. She does try to spit it up. She is coughing her head off.       Past Medical History  Disease Name Date Onset Notes   Allergic rhinitis, seasonal --  --    GERD (gastroesophageal reflux disease) --  --          Past Surgical History  Procedure Name Date Notes   Hysterectomy --  --          Medication List  Name Date Started Instructions   All Day Allergy (cetirizine) 10 mg oral tablet  take 1 tablet (10 mg) by oral route once daily   Colace 100 mg oral capsule  take 1 capsule (100 mg) by oral route once daily   letrozole 2.5 mg oral tablet  take 1 tablet (2.5 mg) by oral route once daily   lisinopril 10 mg oral tablet 10/03/2018 take 1 tablet (10 mg) by oral route once daily   Mucinex D  mg oral tablet extended release 12 hr  --    omeprazole 20 mg oral capsule,delayed release(DR/EC)  take 1 capsule (20 mg) by oral route once daily before a meal   paroxetine HCl 20 mg oral tablet 10/03/2018 take 1 tablet (20 mg) by oral route once daily for 90 days   Shingrix (PF) 50 mcg/0.5 mL intramuscular suspension for reconstitution 10/03/2018 inject 0.5  "milliliter (50 mcg) by IM once repeat 0.5 mL dose 2 to 6 months after the first dose (total of 2 doses)         Allergy List  Allergen Name Date Reaction Notes   PENICILLINS --  --  --    SULFA (SULFONAMIDES) --  --  --          Family Medical History  Disease Name Relative/Age Notes   Alcohol Abuse / Father    Father/          Social History  Finding Status Start/Stop Quantity Notes   Alcohol Current - status unknown --/-- --  15 or more drinks per week   Exercises regularly --  --/-- --  0 times per week   Recreational Drug Use Never --/-- --  never used   Second hand smoke exposure Unknown --/-- --  no   Tobacco Never --/-- --  never uses other tobacco products   Uses seatbelts --  --/-- --  yes         Immunizations  NameDate Admin Mfg Trade Name Lot Number Route Inj VIS Given VIS Publication   HepA07/10/2018 SKB Havrix Adult  IM  07/10/2018 07/01/2016   Comments: NDC [id number]   Rgvmxpmzd09/03/2018 [facility] Fluzone High-Dose [id number]  LA 10/03/2018 08/07/2015   Comments: ndc 92706-079-67   Zqjmxifcd51/26/2017 UNK Unknown TradeName [id number]  UKN 04/20/2018 08/07/2015   Comments:          Review of Systems  · Constitutional  o Admits  o : fatigue  o Denies  o : fever, chills  · Eyes  o Denies  o : discharge from eye  · HENT  o Admits  o : postnasal drip  o Denies  o : headaches, vertigo, lightheadedness, sinus pain, nasal congestion, nasal discharge, sore throat, tinnitus, ear pain, ear fullness  · Cardiovascular  o Denies  o : chest pain, palpitations  · Respiratory  o Admits  o : cough  o Denies  o : shortness of breath, wheezing, abnormal sputum production  · Gastrointestinal  o Denies  o : nausea, vomiting, diarrhea, abdominal pain  · Integument  o Denies  o : rash, pigmentation changes      Vitals  Date Time BP Position Site L\R Cuff Size HR RR TEMP(F) WT  HT  BMI kg/m2 BSA m2 O2 Sat        01/14/2019 08:26 /74 Sitting    65 - R  97.3 121lbs 0oz 4'  11\" 24.44 1.51 93 %           Physical " Examination  · Constitutional  o Appearance  o : well developed, well-nourished, in no acute distress  · Eyes  o Conjunctivae  o : conjunctivae normal, no exudates present  o Pupils and Irises  o : pupils equal and round, pupils reactive to light bilaterally  · Ears, Nose, Mouth and Throat  o Ears  o :   § External Ears  § : auricle appearance normal bilaterally, no auricle tenderness to palpation present, external auditory canal appearance within normal limits  § Otoscopic Examination  § : tympanic membrane appearance within normal limits bilaterally  § Hearing  § : response to sound normal, no tinnitus  o Nose  o :   § External Nose  § : appearance normal  § Intranasal Exam  § : mucosa within normal limits, sinuses non tender to percussion  o Throat  o :   § Oropharynx  § : no inflammation or lesions present, tonsils within normal limits--mild nasopharyngeal discharge present  · Neck  o Inspection/Palpation  o : supple  · Respiratory  o Respiratory Effort  o : breathing unlabored  o Auscultation of Lungs  o : clear to ascultation  · Cardiovascular  o Heart  o :   § Auscultation of Heart  § : regular rate and rhythm  o Peripheral Vascular System  o :   § Extremities  § : no edema  · Lymphatic  o Neck  o : no lymphadenopathy present  · Musculoskeletal  o General  o :   § General Musculoskeletal  § : Muscle tone, strength, and development grossly normal.  · Skin and Subcutaneous Tissue  o General Inspection  o : no lesions present, no areas of discoloration, skin turgor normal, texture normal  · Neurologic  o Gait and Station  o :   § Gait Screening  § : normal gait  · Psychiatric  o Mood and Affect  o : mood normal, affect appropriate          Assessment  · Upper respiratory infection with cough and congestion     465.9/J06.9      Plan  · Orders  o ACO-39: Current medications updated and reviewed () - - 01/14/2019  · Medications  o montelukast 10 mg oral tablet   SIG: take 1 tablet (10 mg) by oral route once  daily in the evening   DISP: (30) tablets with 0 refills  Prescribed on 01/14/2019     o benzonatate 200 mg oral capsule   SIG: take 1 capsule (200 mg) by oral route 3 times per day as needed for cough   DISP: (30) capsules with 0 refills  Prescribed on 01/14/2019     · Instructions  o Take all medications as prescribed/directed.  o Rest. Increase Fluids.  o Patient was educated/instructed on their diagnosis, treatment and medications prior to discharge from the clinic today.  · Disposition  o Call or Return if symptoms worsen or persist.            Electronically Signed by: ORLIN Calvillo -Author on January 14, 2019 08:53:53 AM

## 2021-05-07 NOTE — PROGRESS NOTES
Progress Note      Patient Name: Cristiane Bishop   Patient ID: [id number]   Sex: Female   YOB: 1940        Visit Date: October 3, 2018    Provider: BRENNAN Joseph   Location: [facility]   Location Address: [address] [place] [address]  [id number]   Location Phone: [phone]          Chief Complaint     refills, flu shot, and shingles shot       History Of Present Illness  Cristiane Bishop is a 78 year old /White female who presents for evaluation and treatment of:      refills and flu shot and shingles vaccine      pt sees Dr Ashlyn Paz and she does the labs for pt     will be going to [place] in January hopefully       Past Medical History  Disease Name Date Onset Notes   Allergic rhinitis, seasonal --  --    GERD (gastroesophageal reflux disease) --  --          Past Surgical History  Procedure Name Date Notes   Hysterectomy --  --          Medication List  Name Date Started Instructions   All Day Allergy (cetirizine) 10 mg oral tablet  take 1 tablet (10 mg) by oral route once daily   Colace 100 mg oral capsule  take 1 capsule (100 mg) by oral route once daily   Hair,Skin and Nails oral  --    letrozole 2.5 mg oral tablet  take 1 tablet (2.5 mg) by oral route once daily   lisinopril 10 mg oral tablet 05/18/2018 take 1 tablet (10 mg) by oral route once daily   omeprazole 20 mg oral capsule,delayed release(DR/EC)  take 1 capsule (20 mg) by oral route once daily before a meal   paroxetine HCl 20 mg oral tablet 04/20/2018 take 1 tablet (20 mg) by oral route once daily         Allergy List  Allergen Name Date Reaction Notes   PENICILLINS --  --  --    SULFA (SULFONAMIDES) --  --  --          Family Medical History  Disease Name Relative/Age Notes   Alcohol Abuse / Father    Father/          Social History  Finding Status Start/Stop Quantity Notes   Alcohol Current - status unknown --/-- --  15 or more drinks per week   Exercises regularly --  --/--  "--  0 times per week   Recreational Drug Use Never --/-- --  never used   Second hand smoke exposure Unknown --/-- --  no   Tobacco Never --/-- --  never uses other tobacco products   Uses seatbelts --  --/-- --  yes         Immunizations  NameDate Admin Mfg Trade Name Lot Number Route Inj VIS Given VIS Publication   HepA07/10/2018 SKB Havrix Adult  IM  07/10/2018 07/01/2016   Comments: NDC [id number]   Axrartytu36/26/2017 UNK Unknown TradeName [id number] [facility] 04/20/2018 08/07/2015   Comments:          Review of Systems  · Constitutional  o Denies  o : fatigue, fever  · HENT  o Denies  o : hearing loss or ringing, chronic sinus problem, swollen glands in neck  · Respiratory  o Denies  o : shortness of breath, asthma or wheezing, COPD  · Gastrointestinal  o Denies  o : ulcers, nausea or vomiting  · Genitourinary  o Denies  o : dysuria  · Integument  o Admits  o : new skin lesions  · Neurologic  o Denies  o : lightheaded or dizzy, stroke, headaches  · Musculoskeletal  o Denies  o : joint pain, back pain  · Endocrine  o Denies  o : thyroid disease, diabetes, heat or cold intolerance, excessive thirst or urination  · Psychiatric  o Denies  o : suicidal ideation, homicidal ideation  · Heme-Lymph  o Denies  o : bleeding or bruising tendency, anemia  · Allergic-Immunologic  o Denies  o : allergic dermatitis, frequent illnesses      Vitals  Date Time BP Position Site L\R Cuff Size HR RR TEMP(F) WT  HT  BMI kg/m2 BSA m2 O2 Sat        10/03/2018 12:42 /82 Sitting    81 - R  97.4 122lbs 0oz 5'  0\" 23.83 1.53 100 %           Physical Examination  · Constitutional  o Appearance  o : well developed, well-nourished, in no acute distress  · Head and Face  o HEENT  o : Unremarkable  · Eyes  o Conjunctivae  o : conjunctivae normal  · Neck  o Inspection/Palpation  o : supple  o Thyroid  o : no thyromegaly  · Respiratory  o Respiratory Effort  o : breathing unlabored  o Auscultation of Lungs  o : clear to " ascultation  · Cardiovascular  o Heart  o :   § Auscultation of Heart  § : regular rate and rhythm--occasional irregular heartrate--but after auscultating x 1 min was regular  o Peripheral Vascular System  o :   § Extremities  § : no edema  · Gastrointestinal  o Abdominal Examination  o :   § Abdomen  § : soft   · Lymphatic  o Neck  o : no lymphadenopathy present  · Musculoskeletal  o General  o :   § General Musculoskeletal  § : No joint swelling or deformity., Muscle tone, strength, and development grossly normal.  · Skin and Subcutaneous Tissue  o General Inspection  o : skin turgor normal, texture normal--(+) right facial skin lesion feels like an AK   · Neurologic  o Gait and Station  o :   § Gait Screening  § : normal gait  · Psychiatric  o Mood and Affect  o : mood normal, affect appropriate              Assessment  · Essential hypertension     401.9/I10  · Major depressive disorder     296.20/F32.2  · Immunization due     V05.9/Z23  · Facial lesion     709.9/L98.9  · AK (actinic keratosis)     702.0/L57.0  · Irregular heart beat     427.9/I49.9  · Influenza vaccination not up to date     V49.89/Z78.9      Plan  · Orders  o CMP H (95022) - 427.9/I49.9 - 10/03/2018  o ACO-39: Current medications updated and reviewed () - - 10/03/2018  o EKG (96895) - 427.9/I49.9 - 10/03/2018  o Magnesium ser (82416) - 427.9/I49.9 - 10/03/2018  o DERMATOLOGY CONSULTATION (DERMA) - 709.9/L98.9, 702.0/L57.0 - 10/03/2018   wants seen here in [place] please if possible-  o Fluzone High Dose Flu Vaccine (35480) - - 10/03/2018   Vaccine - Influenza; Dose: 0.5; Site: Left Arm; Route: Intramuscular; Date: 10/03/2018 17:12:00; Exp: 05/03/2019; Lot: [id number]; Mfg: sanofi pasteur; TradeName: Fluzone High-Dose; Administered By: Ila Jones MA; Comment: ndc 98339-429-37  · Medications  o Shingrix (PF) 50 mcg/0.5 mL intramuscular suspension for reconstitution   SIG: inject 0.5 milliliter (50 mcg) by IM once repeat 0.5 mL dose 2 to  6 months after the first dose (total of 2 doses)   DISP: (1) Kit with 0 refills  Prescribed on 10/03/2018     o lisinopril 10 mg oral tablet   SIG: take 1 tablet (10 mg) by oral route once daily   DISP: (90) tablets with 1 refills  Adjusted on 10/03/2018     o paroxetine HCl 20 mg oral tablet   SIG: take 1 tablet (20 mg) by oral route once daily for 90 days   DISP: (90) tablet with 1 refills  Adjusted on 10/03/2018     · Instructions  o Take all medications as prescribed/directed.  o Patient was educated/instructed on their diagnosis, treatment and medications prior to discharge from the clinic today.  o Patient was instructed to exercise regularly.  o Call the office with any concerns or questions.            Electronically Signed by: BRENNAN Joseph -Author on October 3, 2018 05:19:13 PM

## 2021-05-07 NOTE — PROGRESS NOTES
Progress Note      Patient Name: Cristiane Bishop   Patient ID: [id number]   Sex: Female   YOB: 1940        Visit Date: June 19, 2020    Provider: ORLIN Patrick   Location: [facility]   Location Address: [address] [place] [address] [place]  [id number]   Location Phone: [phone]          Chief Complaint     med refills       History Of Present Illness  Cristiane Bishop is a 80 year old /White female who presents for evaluation and treatment of:      HTN: pt states she has high readings when she goes to appointments - she stopped the Lisinopril for a short time but restarted it - no longer feeling hot or flushed -     She is scheduled for Bone Density and mammogram - she has taken Prolia     mood is stable on paroxetine       Past Medical History  Disease Name Date Onset Notes   Allergic rhinitis, seasonal --  --    GERD (gastroesophageal reflux disease) --  --          Past Surgical History  Procedure Name Date Notes   Hysterectomy --  --          Medication List  Name Date Started Instructions   Allergy oral  --    Calcium 600 + D(3) oral  --    Colace 100 mg oral capsule  take 1 capsule (100 mg) by oral route once daily   letrozole 2.5 mg oral tablet  take 1 tablet (2.5 mg) by oral route once daily   lisinopril 10 mg oral tablet 09/26/2019 take 1 tablet (10 mg) by oral route once daily   omeprazole 20 mg oral capsule,delayed release(DR/EC)  take 1 capsule (20 mg) by oral route once daily before a meal   paroxetine HCl 20 mg oral tablet 09/26/2019 take 1 tablet (20 mg) by oral route once daily for 90 days   Prolia 60 mg/mL subcutaneous syringe  inject 1 milliliter (60 mg) by subcutaneous route every 6 months in the upper arm, upper thigh or abdomen         Allergy List  Allergen Name Date Reaction Notes   PENICILLINS --  --  --    SULFA (SULFONAMIDES) --  --  --        Allergies Reconciled  Family Medical History  Disease Name Relative/Age Notes   Alcohol  "abuse Father/   Father         Social History  Finding Status Start/Stop Quantity Notes   Alcohol Current - status unknown --/-- --  15 or more drinks per week   Exercises regularly --  --/-- --  0 times per week   Recreational Drug Use Never --/-- --  never used   Second hand smoke exposure Unknown --/-- --  no   Tobacco Never --/-- --  never uses other tobacco products   Uses seatbelts --  --/-- --  yes         Immunizations  NameDate Admin Mfg Trade Name Lot Number Route Inj VIS Given VIS Publication   HepA04/01/2019 [facility] VAQTA-ADULT [id number] IM LD 04/01/2019    Comments: NDC 7261-9434-51   HepA07/10/2018 SKB HAVRIX-ADULT  IM  07/10/2018 07/01/2016   Comments: NDC [id number]   Blsfdbega65/03/2018 [facility] FLUZONE-HIGH DOSE [id number] IM LA 10/03/2018 08/07/2015   Comments: ndc 14849-210-72   Amugmhswc57/26/2017 UNK Unknown TradeName [id number]  UKN 04/20/2018 08/07/2015   Comments:          Review of Systems  · Constitutional  o Denies  o : fever, headache, chills, body aches  · HENT  o Admits  o : nasal discharge  · Cardiovascular  o Denies  o : chest pain, lower extremity edema, palpitations  · Respiratory  o Denies  o : cough, dry cough  · Gastrointestinal  o Denies  o : nausea, vomiting, diarrhea      Vitals  Date Time BP Position Site L\R Cuff Size HR RR TEMP (F) WT  HT  BMI kg/m2 BSA m2 O2 Sat        06/19/2020 01:35 /92 Sitting    75 - R  98.9 121lbs 6oz 4'  10.5\" 24.94 1.51 98 %    06/19/2020 02:14 /86 Sitting                     Physical Examination  · Constitutional  o Appearance  o : well developed, well-nourished, in no acute distress  · Eyes  o Conjunctivae  o : conjunctivae normal  o Pupils and Irises  o : pupils equal and round, pupils reactive to light bilaterally  · Neck  o Inspection/Palpation  o : supple  o Thyroid  o : no thyromegaly  · Respiratory  o Respiratory Effort  o : breathing unlabored  o Auscultation of Lungs  o : clear to ascultation  · Cardiovascular  o Heart  o : "   § Auscultation of Heart  § : regular rate and rhythm  o Peripheral Vascular System  o :   § Carotid Arteries  § : no bruits present  § Extremities  § : no edema  · Lymphatic  o Neck  o : no lymphadenopathy present  · Musculoskeletal  o General  o :   § General Musculoskeletal  § : No joint swelling or deformity. Muscle tone, strength, and development grossly normal.  · Skin and Subcutaneous Tissue  o General Inspection  o : NL tone  · Neurologic  o Gait and Station  o :   § Gait Screening  § : normal gait  · Psychiatric  o Mood and Affect  o : mood normal, affect appropriate              Assessment  · Essential hypertension     401.9/I10  · Depression     311/F32.9      Plan  · Orders  o HTN/Lipid Panel (CMP, Lipid) [facility] (65942, 42912) - 401.9/I10 - 06/19/2020  o CBC with Auto Diff [facility] (77285) - 401.9/I10 - 06/19/2020  o ACO-18: Negative screen for clinical depression using a standardized tool () - - 06/19/2020  o ACO-39: Current medications updated and reviewed () - - 06/19/2020  · Medications  o lisinopril 10 mg oral tablet   SIG: take 1 tablet (10 mg) by oral route once daily   DISP: (90) Tablet with 1 refills  Adjusted on 06/19/2020     o paroxetine HCl 20 mg oral tablet   SIG: take 1 tablet (20 mg) by oral route once daily for 90 days   DISP: (90) Tablet with 1 refills  Adjusted on 06/19/2020     o Medications have been Reconciled  o Transition of Care or Provider Policy  · Instructions  o Patient advised to monitor blood pressure (B/P) at home and journal readings. Patient informed that a B/P reading at home of more than 135/85 is considered hypertension. For readings greater zeyv277/90 or higher patient is advised to follow up in the office with readings for management. Patient advised to limit sodium intake.  o Take all medications as prescribed/directed.  o Patient was educated/instructed on their diagnosis, treatment and medications prior to discharge from the clinic today.  o Chronic conditions  reviewed and taken into consideration for today's treatment plan.  · Disposition  o Follow up as needed.            Electronically Signed by: ORLIN Patrick -Author on June 19, 2020 02:15:09 PM

## 2021-05-07 NOTE — PROGRESS NOTES
Progress Note      Patient Name: Cristiane Bishop   Patient ID: [id number]   Sex: Female   YOB: 1940        Visit Date: March 6, 2021    Provider: Rodger Jones MD   [place] [facility]   [place] Address[place] [address] [place] [address]   [place]  [address]   Location Phone: [phone]          Chief Complaint     tongue burning and stomach is burning       History Of Present Illness  Cristiane Bishop is a 80 year old /White female who presents for evaluation and treatment of:      pt has had glossitis and burning of esophagus for several yrs- worsened recently when she used Macrobid- med may not be the cause of this  pt has had UTI symptoms- culture grew out yeast- will hold antibiotic for now  pt has a little fatigue  pt has GERD       Past Medical History  Disease Name Date Onset Notes   Allergic rhinitis, seasonal --  --    Depression --  --    Essential hypertension --  --    GERD (gastroesophageal reflux disease) --  --    History of breast cancer --  --    Osteopenia --  --          Past Surgical History  Procedure Name Date Notes   Hysterectomy --  --          Medication List  Name Date Started Instructions   Allergy oral  --    Calcium 600 + D(3) oral  --    Colace 100 mg oral capsule  take 1 capsule (100 mg) by oral route once daily   letrozole 2.5 mg oral tablet  take 1 tablet (2.5 mg) by oral route once daily   lisinopril 10 mg oral tablet 10/27/2020 take 1 tablet (10 mg) by oral route once daily   Macrobid 100 mg oral capsule 03/04/2021 take 1 capsule (100 mg) by oral route every 12 hours with food for 7 days   omeprazole 20 mg oral capsule,delayed release(DR/EC)  take 1 capsule (20 mg) by oral route once daily before a meal   paroxetine HCl 20 mg oral tablet 10/27/2020 take 1 tablet (20 mg) by oral route once daily for 90 days   Prolia 60 mg/mL subcutaneous syringe 11/25/2020 inject 1 milliliter (60 mg) by subcutaneous route every 6 months in the upper arm,  "upper thigh or abdomen for 1 day         Allergy List  Allergen Name Date Reaction Notes   PENICILLINS --  --  --    SULFA (SULFONAMIDES) --  --  --          Family Medical History  Disease Name Relative/Age Notes   Alcohol abuse Father/   Father         Social History  Finding Status Start/Stop Quantity Notes   Alcohol Current - status unknown --/-- --  15 or more drinks per week   Exercises regularly --  --/-- --  0 times per week   Recreational Drug Use Never --/-- --  never used   Second hand smoke exposure Unknown --/-- --  no   Tobacco Never --/-- --  never uses other tobacco products   Uses seatbelts --  --/-- --  yes         Immunizations  NameDate Admin Mfg Trade Name Lot Number Route Inj VIS Given VIS Publication   HepA04/01/2019 MSD VAQTA-ADULT [id number] IM LD 04/01/2019    Comments: NDC 0784-6979-99   HepA07/10/2018 SKB HAVRIX-ADULT  IM  07/10/2018 07/01/2016   Comments: NDC [id number]   Fctoemevv11/03/2018 PMC FLUZONE-HIGH DOSE [id number] IM LA 10/03/2018 08/07/2015   Comments: ndc 63707-208-99   Kndhqubva6974/26/2018 MSD PNEUMOVAX 23 [id number] NE NE 10/27/2020    Comments:    Emfwtxt3820/17/2016 [facility] PREVNAR 13 [id number] NE NE 10/27/2020    Comments:    Lgsygajr63/01/2014 NE Not Entered  NE NE 10/27/2020    Comments:    Tdap01/01/2014 [facility] ADACEL [id number] NE NE 10/27/2020    Comments:          Review of Systems  · Constitutional  o Admits  o : fatigue  o Denies  o : fever  · HENT  o Admits  o : sore throat  · Cardiovascular  o Denies  o : chest pain, palpitations  · Respiratory  o Denies  o : shortness of breath, cough  · Gastrointestinal  o Denies  o : nausea, vomiting, diarrhea  · Genitourinary  o Admits  o : dysuria      Vitals  Date Time BP Position Site L\R Cuff Size HR RR TEMP (F) WT  HT  BMI kg/m2 BSA m2 O2 Sat FR L/min FiO2 HC       03/06/2021 10:11 /84 Sitting    76 - R  97.6 124lbs 6oz 4'  10\" 25.99 1.52 96 %  21%          Physical Examination  · Constitutional  o Appearance  o : well " developed, well-nourished, in no acute distress  · Head and Face  o HEENT  o : MMM, erythema of throat, uvula midline, throat open, no abscesses seen  · Respiratory  o Respiratory Effort  o : breathing unlabored  o Auscultation of Lungs  o : clear to ascultation  · Cardiovascular  o Heart  o :   § Auscultation of Heart  § : regular rate and rhythm  o Peripheral Vascular System  o :   § Extremities  § : no edema  · Gastrointestinal  o Abdomen  o : soft, non-tender, non-distended, + bowel sounds, no hepatosplenomegaly, no masses palpated, no CVA tenderness  · Musculoskeletal  o General  o :   § General Musculoskeletal  § : No joint swelling or deformity. Muscle tone, strength, and development grossly normal.  · Neurologic  o Gait and Station  o :   § Gait Screening  § : normal gait  · Psychiatric  o Mood and Affect  o : mood normal, affect appropriate          Assessment  · Fatigue     780.79/R53.83  · GERD (gastroesophageal reflux disease)     530.81/K21.9  · UTI (urinary tract infection)     599.0/N39.0  · Glossitis     529.0/K14.0  · Pharyngitis     462/J02.9      Plan  · Orders  o H pylori ag stool EIA (11366) - 530.81/K21.9 - 03/06/2021  o Throat culture and sensitivity (05849) - 462/J02.9 - 03/06/2021  o ACO-39: Current medications updated and reviewed (1159F, ) - - 03/06/2021  o Iron + transferrin (TIBC, calculated % saturation) (48751) - 529.0/K14.0 - 03/06/2021  · Medications  o Protonix 40 mg oral tablet,delayed release (DR/EC)   SIG: take 1 tablet (40 mg) by oral route once daily for 30 days   DISP: (30) Tablet with 0 refills  Prescribed on 03/06/2021     o Diflucan 200 mg oral tablet   SIG: take 1 tablet (200 mg) by oral route once daily for 5 days   DISP: (5) Tablet with 0 refills  Prescribed on 03/06/2021     o Macrobid 100 mg oral capsule   SIG: take 1 capsule (100 mg) by oral route every 12 hours with food for 7 days   DISP: (14) Capsule with 0 refills  Discontinued on 03/06/2021     o omeprazole  20 mg oral capsule,delayed release(DR/EC)   SIG: take 1 capsule (20 mg) by oral route once daily before a meal   DISP: (0) capsule with 0 refills  Discontinued on 03/06/2021     o Medications have been Reconciled  o Transition of Care or Provider Policy  · Instructions  o Patient was educated/instructed on their diagnosis, treatment and medications prior to discharge from the clinic today.            Electronically Signed by: Rodger Jones MD -Author on March 6, 2021 10:41:57 AM

## 2021-05-07 NOTE — PROGRESS NOTES
Progress Note      Patient Name: Cristiane Bishop   Patient ID: [id number]   Sex: Female   YOB: 1940        Visit Date: September 26, 2019    Provider: ORLIN Patrick   Location: [facility]   Location Address: [address] [place] [address] [place]  [address]   Location Phone: [phone]          Chief Complaint     medication refills       History Of Present Illness  Cristiane Bishop is a 79 year old /White female who presents for evaluation and treatment of:      pt is fasting for labs today     HTN: denies dizziness with BP     Constipation: stable on stool softener     Osteopenia: take Prolia every 6 months and takes daily Vitamin D and Calcium     GERD: symtpoms are stable on omeprazole, buys OTC - pt plans to change to Zantac    has a lot of sinus drainage and congestion and burning of the tongue     Goes to Dr. Paz and has been 5 years since right mastectomy and was told she could have us manage her letrozole and have yearly mammograms  - had mammogram in May and was normal       Past Medical History  Disease Name Date Onset Notes   Allergic rhinitis, seasonal --  --    GERD (gastroesophageal reflux disease) --  --          Past Surgical History  Procedure Name Date Notes   Hysterectomy --  --          Medication List  Name Date Started Instructions   Calcium 600 + D(3) oral  --    Colace 100 mg oral capsule  take 1 capsule (100 mg) by oral route once daily   letrozole 2.5 mg oral tablet  take 1 tablet (2.5 mg) by oral route once daily   lisinopril 10 mg oral tablet 09/24/2019 take 1 tablet (10 mg) by oral route once daily   omeprazole 20 mg oral capsule,delayed release(DR/EC)  take 1 capsule (20 mg) by oral route once daily before a meal   paroxetine HCl 20 mg oral tablet 09/24/2019 take 1 tablet (20 mg) by oral route once daily for 90 days   Prolia 60 mg/mL subcutaneous syringe  inject 1 milliliter (60 mg) by subcutaneous route every 6 months in the  upper arm, upper thigh or abdomen         Allergy List  Allergen Name Date Reaction Notes   PENICILLINS --  --  --    SULFA (SULFONAMIDES) --  --  --        Allergies Reconciled  Family Medical History  Disease Name Relative/Age Notes   Alcohol Abuse Father/   Father         Social History  Finding Status Start/Stop Quantity Notes   Alcohol Current - status unknown --/-- --  15 or more drinks per week   Exercises regularly --  --/-- --  0 times per week   Recreational Drug Use Never --/-- --  never used   Second hand smoke exposure Unknown --/-- --  no   Tobacco Never --/-- --  never uses other tobacco products   Uses seatbelts --  --/-- --  yes         Immunizations  NameDate Admin Mfg Trade Name Lot Number Route Inj VIS Given VIS Publication   HepA04/01/2019 MSD VAQTA-ADULT [id number] IM LD 04/01/2019    Comments: NDC 2271-2016-05   HepA07/10/2018 SKB HAVRIX-ADULT  IM  07/10/2018 07/01/2016   Comments: NDC [id number]   Fxptcyzbb27/03/2018 PMC FLUZONE-HIGH DOSE [id number] IM [place] 10/03/2018 08/07/2015   Comments: ndc 58334-420-93   Xtlameken03/26/2017 UNK Unknown TradeName [id number] IM [facility] 04/20/2018 08/07/2015   Comments:          Review of Systems  · Constitutional  o Denies  o : fever, headache, chills  · HENT  o Admits  o : nasal congestion, nasal discharge  · Cardiovascular  o Denies  o : chest pain, palpitations  · Respiratory  o Admits  o : cough  o Denies  o : shortness of breath, wheezing  · Gastrointestinal  o Denies  o : nausea, vomiting, diarrhea, heartburn      Vitals  Date Time BP Position Site L\R Cuff Size HR RR TEMP (F) WT  HT  BMI kg/m2 BSA m2 O2 Sat        09/26/2019 11:24 /80 Sitting    87 - R  97.2 122lbs 8oz    99 %          Physical Examination  · Constitutional  o Appearance  o : well developed, well-nourished, in no acute distress  · Eyes  o Conjunctivae  o : conjunctivae normal  o Pupils and Irises  o : pupils equal and round, pupils reactive to light  bilaterally  · Neck  o Inspection/Palpation  o : supple  o Thyroid  o : no thyromegaly  · Respiratory  o Respiratory Effort  o : breathing unlabored  o Auscultation of Lungs  o : clear to ascultation  · Cardiovascular  o Heart  o :   § Auscultation of Heart  § : regular rate and rhythm  o Peripheral Vascular System  o :   § Extremities  § : no edema  · Lymphatic  o Neck  o : no lymphadenopathy present  · Musculoskeletal  o General  o :   § General Musculoskeletal  § : No joint swelling or deformity. Muscle tone, strength, and development grossly normal.  · Skin and Subcutaneous Tissue  o General Inspection  o : NL tone  · Neurologic  o Gait and Station  o :   § Gait Screening  § : normal gait  · Psychiatric  o Mood and Affect  o : mood normal, affect appropriate              Assessment  · Essential hypertension     401.9/I10  · Allergic rhinitis, seasonal     477.9/J30.2  · Osteopenia     733.90/M85.80      Plan  · Orders  o CBC with Auto Diff [facility] (85346) - 401.9/I10 - 09/26/2019  o CMP [facility] (88605) - 401.9/I10 - 09/26/2019  o Lipid Panel [facility] (94200) - 401.9/I10 - 09/26/2019  o TSH [facility] (17647) - 401.9/I10 - 09/26/2019  o Urinalysis with Reflex Microscopy if abnormal ([facility]) (94056) - 401.9/I10 - 09/26/2019  o ACO-39: Current medications updated and reviewed () - - 09/26/2019  · Medications  o lisinopril 10 mg oral tablet   SIG: take 1 tablet (10 mg) by oral route once daily   DISP: (90) Tablet with 1 refills  Adjusted on 09/26/2019     o paroxetine HCl 20 mg oral tablet   SIG: take 1 tablet (20 mg) by oral route once daily for 90 days   DISP: (90) Tablet with 1 refills  Adjusted on 09/26/2019     o Medications have been Reconciled  o Transition of Care or Provider Policy  · Instructions  o Patient was educated/instructed on their diagnosis, treatment and medications prior to discharge from the clinic today.  · Disposition  o Follow up as needed.            Electronically Signed by: ORLIN Patrick -Author on  September 26, 2019 12:02:01 PM

## 2021-05-07 NOTE — PROGRESS NOTES
Progress Note      Patient Name: Cristiane Bishop   Patient ID: [id number]   Sex: Female   YOB: 1940        Visit Date: December 13, 2018    Provider: ORLIN Patrick   Location: [facility]   Location Address: [address] [place] [address]  [id number]   Location Phone: [phone]          Chief Complaint     drainage, headache, dizziness. started about 3 weeks ago.       History Of Present Illness  Cristiane Bishop is a 78 year old /White female who presents for evaluation and treatment of:      Drainage, headache, dizziness x 3 weeks -       Past Medical History  Disease Name Date Onset Notes   Allergic rhinitis, seasonal --  --    GERD (gastroesophageal reflux disease) --  --          Past Surgical History  Procedure Name Date Notes   Hysterectomy --  --          Medication List  Name Date Started Instructions   All Day Allergy (cetirizine) 10 mg oral tablet  take 1 tablet (10 mg) by oral route once daily   Colace 100 mg oral capsule  take 1 capsule (100 mg) by oral route once daily   letrozole 2.5 mg oral tablet  take 1 tablet (2.5 mg) by oral route once daily   lisinopril 10 mg oral tablet 10/03/2018 take 1 tablet (10 mg) by oral route once daily   omeprazole 20 mg oral capsule,delayed release(DR/EC)  take 1 capsule (20 mg) by oral route once daily before a meal   paroxetine HCl 20 mg oral tablet 10/03/2018 take 1 tablet (20 mg) by oral route once daily for 90 days   Shingrix (PF) 50 mcg/0.5 mL intramuscular suspension for reconstitution 10/03/2018 inject 0.5 milliliter (50 mcg) by IM once repeat 0.5 mL dose 2 to 6 months after the first dose (total of 2 doses)         Allergy List  Allergen Name Date Reaction Notes   PENICILLINS --  --  --    SULFA (SULFONAMIDES) --  --  --          Family Medical History  Disease Name Relative/Age Notes   Alcohol Abuse / Father    Father/          Social History  Finding Status Start/Stop Quantity Notes   Alcohol  Current - status unknown --/-- --  15 or more drinks per week   Exercises regularly --  --/-- --  0 times per week   Recreational Drug Use Never --/-- --  never used   Second hand smoke exposure Unknown --/-- --  no   Tobacco Never --/-- --  never uses other tobacco products   Uses seatbelts --  --/-- --  yes         Immunizations  NameDate Admin Mfg Trade Name Lot Number Route Inj VIS Given VIS Publication   HepA07/10/2018 SKB Havrix Adult  IM  07/10/2018 07/01/2016   Comments: NDC [id number]   Xybagrgrn57/03/2018 PMC Fluzone High-Dose [id number] IM [place] 10/03/2018 08/07/2015   Comments: ndc 77796-474-51   Jevyvdelz87/26/2017 UNK Unknown TradeName [id number] IM [facility] 04/20/2018 08/07/2015   Comments:          Review of Systems  · Constitutional  o Admits  o : fatigue, headache  o Denies  o : fever, chills, body aches  · HENT  o Admits  o : sinus pain, nasal congestion, nasal discharge, tenderness across the nasal bridge  o Denies  o : sore throat, ear pain, ear fullness  · Respiratory  o Admits  o : cough, productive cough  o Denies  o : wheezing  · Integument  o Denies  o : rash      Vitals  Date Time BP Position Site L\R Cuff Size HR RR TEMP(F) WT  HT  BMI kg/m2 BSA m2 O2 Sat        12/13/2018 02:40 /88 Sitting    91 - R  97.6 122lbs 8oz    96 %           Physical Examination  · Constitutional  o Appearance  o : well developed, well-nourished, in no acute distress  · Eyes  o Conjunctivae  o : conjunctivae normal  o Pupils and Irises  o : pupils equal and round, pupils reactive to light bilaterally  · Ears, Nose, Mouth and Throat  o Ears  o :   § External Ears  § : no auricle tenderness to palpation present  § Otoscopic Examination  § : tympanic membrane appearance within normal limits bilaterally, no tympanic membrane lesions present  § Hearing  § : response to sound normal, no tinnitus  o Nose  o :   § Intranasal Exam  § : nasal mucosa inflammation present   o Throat  o :   § Oropharynx  § : discharge present    · Neck  o Inspection/Palpation  o : supple  o Thyroid  o : no thyromegaly  · Respiratory  o Respiratory Effort  o : breathing unlabored  o Auscultation of Lungs  o : clear to ascultation  · Cardiovascular  o Heart  o :   § Auscultation of Heart  § : regular rate and rhythm  o Peripheral Vascular System  o :   § Extremities  § : no edema  · Lymphatic  o Neck  o : no lymphadenopathy present  · Musculoskeletal  o General  o :   § General Musculoskeletal  § : No joint swelling or deformity. Muscle tone, strength, and development grossly normal.  · Skin and Subcutaneous Tissue  o General Inspection  o : NL tone  · Neurologic  o Gait and Station  o :   § Gait Screening  § : normal gait  · Psychiatric  o Mood and Affect  o : mood normal, affect appropriate              Assessment  · Sinusitis, acute     461.9/J01.90      Plan  · Orders  o ACO-39: Current medications updated and reviewed () - - 12/13/2018  · Medications  o cefdinir 300 mg oral capsule   SIG: take 1 capsule (300 mg) by oral route every 12 hours for 10 days   DISP: (20) capsules with 0 refills  Prescribed on 12/13/2018     · Instructions  o Take all medications as prescribed/directed.  o Rest. Increase Fluids.  o Patient was educated/instructed on their diagnosis, treatment and medications prior to discharge from the clinic today.  o May continue to take Mucinex-D.  · Disposition  o Call or Return if symptoms worsen or persist.            Electronically Signed by: ORLIN Patrick -Author on December 13, 2018 03:10:07 PM

## 2021-05-07 NOTE — PROGRESS NOTES
Progress Note      Patient Name: Cristiane Bishop   Patient ID: [id number]   Sex: Female   YOB: 1940        Visit Date: May 18, 2018    Provider: ORLIN Calvillo   Location: [facility]   Location Address: [address] [place] [address]  [id number]   Location Phone: [phone]          Chief Complaint     follow up on BP med       History Of Present Illness  Cristiane Bishop is a 77 year old /White female who presents for evaluation and treatment of:      follow up on initiation of Lisinopril. She states she is tolerating it well. She really cannot tell a difference in how she feels. She isn't checking her blood pressure at home.     Her labs showed an elevation in her triglycerides, but her cholesterol was normal. She states that high triglycerides run in her family. She is not really interested in starting a statin at this time.       Past Medical History  Disease Name Date Onset Notes   Allergic rhinitis, seasonal --  --    GERD (gastroesophageal reflux disease) --  --          Past Surgical History  Procedure Name Date Notes   Hysterectomy --  --          Medication List  Name Date Started Instructions   All Day Allergy (cetirizine) 10 mg oral tablet  take 1 tablet (10 mg) by oral route once daily   Colace 100 mg oral capsule  take 1 capsule (100 mg) by oral route once daily   Hair,Skin and Nails oral  --    letrozole 2.5 mg oral tablet  take 1 tablet (2.5 mg) by oral route once daily   lisinopril 10 mg oral tablet 05/18/2018 take 1 tablet (10 mg) by oral route once daily   omeprazole 20 mg oral capsule,delayed release(DR/EC)  take 1 capsule (20 mg) by oral route once daily before a meal   paroxetine HCl 20 mg oral tablet 04/20/2018 take 1 tablet (20 mg) by oral route once daily         Allergy List  Allergen Name Date Reaction Notes   PENICILLINS --  --  --    SULFA (SULFONAMIDES) --  --  --          Family Medical History  Disease Name Relative/Age  Notes   Alcohol Abuse / Father    Father/          Social History  Finding Status Start/Stop Quantity Notes   Alcohol Current - status unknown --/-- --  15 or more drinks per week   Exercises regularly --  --/-- --  0 times per week   Recreational Drug Use Never --/-- --  never used   Second hand smoke exposure Unknown --/-- --  no   Tobacco Never --/-- --  never uses other tobacco products   Uses seatbelts --  --/-- --  yes         Immunizations  NameDate Admin Mfg Trade Name Lot Number Route Inj VIS Given VIS Publication   Tdvewpqam29/26/2017 UNK Unknown TradeName [id number] IM UKN 04/20/2018 08/07/2015   Comments:          Review of Systems  · Constitutional  o Admits  o : good general health lately  o Denies  o : fatigue  · Eyes  o Denies  o : double vision, blurred vision  · HENT  o Denies  o : headaches, vertigo, lightheadedness  · Cardiovascular  o Denies  o : chest pain, irregular heart beats, syncope, dyspnea on exertion  · Respiratory  o Denies  o : shortness of breath, wheezing, cough  · Neurologic  o Denies  o : dizziness      Vitals  Date Time BP Position Site L\R Cuff Size HR RR TEMP(F) WT  HT  BMI kg/m2 BSA m2 O2 Sat        05/18/2018 10:29 /80 Sitting    85 - R   120lbs 0oz    99 %           Physical Examination  · Constitutional  o Appearance  o : well developed, well-nourished, in no acute distress  · Respiratory  o Respiratory Effort  o : breathing unlabored  o Auscultation of Lungs  o : clear to ascultation  · Cardiovascular  o Heart  o :   § Auscultation of Heart  § : regular rate and rhythm  o Peripheral Vascular System  o :   § Extremities  § : no edema  · Musculoskeletal  o General  o :   § General Musculoskeletal  § : No joint swelling or deformity. Muscle tone, strength, and development grossly normal.  · Neurologic  o Gait and Station  o :   § Gait Screening  § : normal gait  · Psychiatric  o Mood and Affect  o : mood normal, affect appropriate              Assessment  · Essential  hypertension     401.9/I10  · Hyperlipidemia     272.4/E78.5      Plan  · Orders  o ACO-39: Current medications updated and reviewed () - - 05/18/2018  · Medications  o lisinopril 10 mg oral tablet   SIG: take 1 tablet (10 mg) by oral route once daily   DISP: (30) tablets with 4 refills  Adjusted on 05/18/2018     · Instructions  o Patient advised to monitor blood pressure (B/P) at home and journal readings. Patient informed that a B/P reading at home of more than 135/85 is considered hypertension. For readings greater wabm549/90 or higher patient is advised to follow up in the office with readings for management. Patient advised to limit sodium intake.  o Patient was educated and given low cholesterol diet information.  o Advised that cheeses and other sources of dairy fats, animal fats, fast food, and the extras (candy, pasteries, pies, doughnuts and cookies) all contain LDL raising nutrients. Advised to increase fruits, vegetables, whole grains, and to monitor portion sizes.   o Patient was educated/instructed on their diagnosis, treatment and medications prior to discharge from the clinic today.  · Disposition  o Call or Return if symptoms worsen or persist.            Electronically Signed by: ORLIN Calvillo -Author on May 18, 2018 10:48:07 AM

## 2021-05-07 NOTE — PROGRESS NOTES
Progress Note      Patient Name: Cristiane Bishop   Patient ID: [id number]   Sex: Female   YOB: 1940        Visit Date: April 20, 2018    Provider: ORLIN Calvillo   Location: [facility], operated [facility]   Location Address: [address] [place] [address]  [id number]   Location Phone: [phone]          Chief Complaint     medication refills       History Of Present Illness  Cristiane Bishop is a 77 year old /White female who presents for evaluation and treatment of:      chronic health conditions and medication refills.     She needs a refill on her Paxil. She states her depression is currently stable. She is glad winter is over. She tends to get a little bit more depressed in the winter months. She stays active. 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 He has 2, but she only keeps one at a time. One of them is 3, and the other is going to be 2 in June.     Her BP today is elevated. Review of old records showed /94. She denies any hx of chest pain, palpitations, headaches, dizziness, blurred vision/double vision, shortness of breath, or swelling in her legs. No lightheadedness or syncope.       Past Medical History  Disease Name Date Onset Notes   Allergic rhinitis, seasonal --  --    GERD (gastroesophageal reflux disease) --  --          Past Surgical History  Procedure Name Date Notes   Hysterectomy --  --          Medication List  Name Date Started Instructions   All Day Allergy (cetirizine) 10 mg oral tablet  take 1 tablet (10 mg) by oral route once daily   Colace 100 mg oral capsule  take 1 capsule (100 mg) by oral route once daily   Hair,Skin and Nails oral  --    letrozole 2.5 mg oral tablet  take 1 tablet (2.5 mg) by oral route once daily   omeprazole 20 mg oral capsule,delayed release(DR/EC)  take 1 capsule (20 mg) by oral route once daily before a meal   paroxetine HCl 20 mg oral tablet  take 1 tablet (20 mg) by oral route once daily         Allergy  List  Allergen Name Date Reaction Notes   PENICILLINS --  --  --    SULFA (SULFONAMIDES) --  --  --          Family Medical History  Disease Name Relative/Age Notes   Alcohol Abuse / Father    Father/          Social History  Finding Status Start/Stop Quantity Notes   Alcohol Current - status unknown --/-- --  15 or more drinks per week   Exercises regularly --  --/-- --  0 times per week   Recreational Drug Use Never --/-- --  never used   Second hand smoke exposure Unknown --/-- --  no   Tobacco Never --/-- --  never uses other tobacco products   Uses seatbelts --  --/-- --  yes         Immunizations  NameDate Admin Mfg Trade Name Lot Number Route Inj VIS Given VIS Publication   Cqeapxjsq55/26/2017 UNK Unknown [facility]Name [id number] [facility] 04/20/2018 08/07/2015   Comments:          Review of Systems  · Constitutional  o Admits  o : good general health lately  o Denies  o : fatigue, chills, night sweats  · Eyes  o Denies  o : double vision, blurred vision  · HENT  o Denies  o : headaches, vertigo, lightheadedness  · Cardiovascular  o Denies  o : chest pain, irregular heart beats, syncope, dyspnea on exertion, lower extremity edema, palpitations  · Respiratory  o Denies  o : shortness of breath, wheezing, cough  · Gastrointestinal  o Denies  o : nausea, vomiting, diarrhea, abdominal pain  · Genitourinary  o Denies  o : urgency, frequency, dysuria  · Psychiatric  o Admits  o : depression  o Denies  o : anxiety, difficulty sleeping, suicidal ideation, homicidal ideation      Vitals  Date Time BP Position Site L\R Cuff Size HR RR TEMP(F) WT  HT  BMI kg/m2 BSA m2 O2 Sat HC       04/20/2018 12:52 /84 Sitting    98 - R  98.4 122lbs 1oz    97 %     04/20/2018 01:07 /88 Sitting                     Physical Examination  · Constitutional  o Appearance  o : well developed, well-nourished, in no acute distress  · Eyes  o Conjunctivae  o : conjunctivae normal, no exudates present  o Pupils and Irises  o : pupils equal and  round, pupils reactive to light bilaterally  · Neck  o Inspection/Palpation  o : normal appearance, no masses or tenderness, trachea midline  o Thyroid  o : no thyromegaly  · Respiratory  o Respiratory Effort  o : breathing unlabored  o Auscultation of Lungs  o : clear to ascultation  · Cardiovascular  o Heart  o :   § Auscultation of Heart  § : regular rate and rhythm  o Peripheral Vascular System  o :   § Carotid Arteries  § : normal pulses bilaterally, no bruits present  § Pedal Pulses  § : bilateral pulse strength 2+  § Extremities  § : no edema  · Lymphatic  o Neck  o : no lymphadenopathy present  · Musculoskeletal  o General  o :   § General Musculoskeletal  § : No joint swelling or deformity. Muscle tone, strength, and development grossly normal.  · Skin and Subcutaneous Tissue  o General Inspection  o : no lesions present, no areas of discoloration, skin turgor normal, texture normal  · Neurologic  o Gait and Station  o :   § Gait Screening  § : normal gait  · Psychiatric  o Mood and Affect  o : mood normal, affect appropriate              Assessment  · Essential hypertension     401.9/I10  · Depression     311/F32.9  · Influenza vaccination up to date     V49.89/Z78.9      Plan  · Orders  o Collection Fee for Venipuncture (51501) - - 04/20/2018  o CBC with Auto Diff [facility] (82244) - 401.9/I10, 311/F32.9 - 04/20/2018  o CMP [facility] (36700) - 401.9/I10, 311/F32.9 - 04/20/2018  o Lipid Panel [facility] (67345) - 401.9/I10, 311/F32.9 - 04/20/2018  o Urinalysis with Reflex Microscopy if abnormal ([facility]) (52364) - 401.9/I10, 311/F32.9 - 04/20/2018  o Urine microalbumin (61701) - 401.9/I10, 311/F32.9 - 04/20/2018  o ACO-39: Current medications updated and reviewed () - - 04/20/2018  o ACO-18: Screening for clinical depression not completed due to current diagnosis of depression or bipolar disorder () - - 04/20/2018  o Influenza immunization was not ordered or administered for reasons documented by clinician () -  V49.89/Z78.9 - 04/20/2018  o ACO-13: Fall Risk Screening with no falls in past year or only one fall without injury in the past year (1101F) - - 04/20/2018  · Medications  o lisinopril 10 mg oral tablet   SIG: take 1 tablet (10 mg) by oral route once daily   DISP: (30) tablets with 0 refills  Prescribed on 04/20/2018     o paroxetine HCl 20 mg oral tablet   SIG: take 1 tablet (20 mg) by oral route once daily   DISP: (90) tablet with 1 refills  Adjusted on 04/20/2018     · Instructions  o Patient advised to monitor blood pressure (B/P) at home and journal readings. Patient informed that a B/P reading at home of more than 135/85 is considered hypertension. For readings greater hrzf611/90 or higher patient is advised to follow up in the office with readings for management. Patient advised to limit sodium intake.  o Take all medications as prescribed/directed.  o Patient was educated/instructed on their diagnosis, treatment and medications prior to discharge from the clinic today.  o Chronic conditions reviewed and taken into consideration for today's treatment plan.  · Disposition  o Call or Return if symptoms worsen or persist.  o Return Visit Request in/on 1 month +/- 2 days (1800).            Electronically Signed by: ORLIN Calvillo -Author on April 20, 2018 01:20:07 PM

## 2021-05-07 NOTE — PROGRESS NOTES
Progress Note      Patient Name: Cristiane Bishop   Patient ID: [id number]   Sex: Female   YOB: 1940        Visit Date: March 26, 2021    Provider: Rodger Jones MD   Location: [facility]   Location Address: [address] [place] [address] [place]  [address]   Location Phone: [phone]          Chief Complaint     unable to take the tetracycline       History Of Present Illness  Cristiane Bishop is a 80 year old /White female who presents for evaluation and treatment of:      pt has H. Pylori and has an allergy to PCN and unable to take tetracycline due to unable to swallow medicine- it gets stuck in esophagus- pt will need referral to GI       Past Medical History  Disease Name Date Onset Notes   Allergic rhinitis, seasonal --  --    Depression --  --    Essential hypertension --  --    GERD (gastroesophageal reflux disease) --  --    History of breast cancer --  --    Osteopenia --  --          Past Surgical History  Procedure Name Date Notes   Hysterectomy --  --          Medication List  Name Date Started Instructions   Allergy oral  --    bismuth subsalicylate 262 mg/15 mL oral suspension 03/18/2021 take 30 milliliters by oral route 3 times a day for 14 days   Calcium 600 + D(3) oral  --    Colace 100 mg oral capsule  take 1 capsule (100 mg) by oral route once daily   ferrous sulfate 325 mg (65 mg iron) oral tablet 03/08/2021 take 1 tablet (325 mg) by oral route once daily for 30 days   letrozole 2.5 mg oral tablet  take 1 tablet (2.5 mg) by oral route once daily   lisinopril 10 mg oral tablet 10/27/2020 take 1 tablet (10 mg) by oral route once daily   metronidazole 500 mg oral tablet 03/18/2021 take 1 tablet by oral route 3 times a day for 14 days   paroxetine HCl 20 mg oral tablet 10/27/2020 take 1 tablet (20 mg) by oral route once daily for 90 days   Prolia 60 mg/mL subcutaneous syringe 11/25/2020 inject 1 milliliter (60 mg) by subcutaneous route every 6 months  in the upper arm, upper thigh or abdomen for 1 day   Protonix 40 mg oral tablet,delayed release (/EC) 03/18/2021 take 1 tablet (40 mg) by oral route once daily for 30 days         Allergy List  Allergen Name Date Reaction Notes   PENICILLINS --  --  --    SULFA (SULFONAMIDES) --  --  --    tetracycline --  --  --          Family Medical History  Disease Name Relative/Age Notes   Alcohol abuse Father/   Father         Social History  Finding Status Start/Stop Quantity Notes   Alcohol Current - status unknown --/-- --  15 or more drinks per week   Exercises regularly --  --/-- --  0 times per week   Recreational Drug Use Never --/-- --  never used   Second hand smoke exposure Unknown --/-- --  no   Tobacco Never --/-- --  never uses other tobacco products   Uses seatbelts --  --/-- --  yes         Immunizations  NameDate Admin Mfg Trade Name Lot Number Route Inj VIS Given VIS Publication   HepA04/01/2019 MSD VAQTA-ADULT [id number] IM LD 04/01/2019    Comments: NDC 8347-1918-81   HepA07/10/2018 SKB HAVRIX-ADULT  IM  07/10/2018 07/01/2016   Comments: NDC [id number]   Naqdnrpfi31/03/2018 PMC FLUZONE-HIGH DOSE [id number] IM [place] 10/03/2018 08/07/2015   Comments: ndc 17082-118-15   Ornlsgklt8622/26/2018 MSD PNEUMOVAX 23 [id number] NE NE 10/27/2020    Comments:    Cegwsij4149/17/2016 WAL PREVNAR 13 [id number] NE NE 10/27/2020    Comments:    Hoooipwa47/01/2014 NE Not Entered  NE NE 10/27/2020    Comments:    Tdap01/01/2014 PMC ADACEL [id number] NE NE 10/27/2020    Comments:          Review of Systems  · Constitutional  o Denies  o : fatigue, fever  · Cardiovascular  o Denies  o : chest pain, palpitations  · Respiratory  o Denies  o : shortness of breath, cough  · Gastrointestinal  o Admits  o : heartburn  o Denies  o : nausea, vomiting, diarrhea      Vitals  Date Time BP Position Site L\R Cuff Size HR RR TEMP (F) WT  HT  BMI kg/m2 BSA m2 O2 Sat FR L/min FiO2 HC       03/26/2021 12:45 /84 Sitting    79 - R  96.6 125lbs 0oz    95  %            Physical Examination  · Constitutional  o Appearance  o : well developed, well-nourished, in no acute distress  · Head and Face  o HEENT  o : Unremarkable, MMM  · Respiratory  o Respiratory Effort  o : breathing unlabored  o Auscultation of Lungs  o : clear to ascultation  · Cardiovascular  o Heart  o :   § Auscultation of Heart  § : regular rate and rhythm  o Peripheral Vascular System  o :   § Extremities  § : no edema  · Musculoskeletal  o General  o :   § General Musculoskeletal  § : No joint swelling or deformity., Muscle tone, strength, and development grossly normal.  · Neurologic  o Gait and Station  o :   § Gait Screening  § : normal gait  · Psychiatric  o Mood and Affect  o : mood normal, affect appropriate          Assessment  · H. pylori infection     041.86/A04.8      Plan  · Orders  o ACO-39: Current medications updated and reviewed (, 1159F) - - 03/26/2021  o Gastroenterology Consultation (GASTR) - 041.86/A04.8 - 03/26/2021   pt has H. Pylori and has an allergy to PCN and unable to take tetracycline due to unable to swallow medicine- it gets stuck in esophagus  · Medications  o Medications have been Reconciled  o Transition of Care or Provider Policy  · Instructions  o Patient was educated/instructed on their diagnosis, treatment and medications prior to discharge from the clinic today.            Electronically Signed by: Rodger Jones MD -Author on March 26, 2021 01:04:28 PM

## 2021-05-07 NOTE — PROGRESS NOTES
"   Progress Note      Patient Name: Cristiane Bishop   Patient ID: [id number]   Sex: Female   YOB: 1940        Visit Date: March 4, 2021    Provider: ORLIN Calvillo   Location: [facility]   Location Address: [address] [place] [address] [address] [place]  [address]   Location Phone: [phone]          Chief Complaint     C/O back pain and urinary frequency x two weeks.       History Of Present Illness  Cristiane Bishop is a 80 year old /White female who presents for evaluation and treatment of:      for the past two week she has had urinary urgency and frequency with burning and then in the past few days her lower back has started to hurt and she has a burning sensation in the lower abdomen - no nausea, vomiting, fever, chills, diarrhea, or constipation. No gross hematuria.     She is also c/o burning in her mouth - the tongue and \"my whole mouth\" - has been ongoing for years. Once had it checked out as a child but they just told her that there was nothing they could do about it.       Past Medical History  Disease Name Date Onset Notes   Allergic rhinitis, seasonal --  --    Depression --  --    Essential hypertension --  --    GERD (gastroesophageal reflux disease) --  --    History of breast cancer --  --    Osteopenia --  --          Past Surgical History  Procedure Name Date Notes   Hysterectomy --  --          Medication List  Name Date Started Instructions   Allergy oral  --    Calcium 600 + D(3) oral  --    Colace 100 mg oral capsule  take 1 capsule (100 mg) by oral route once daily   letrozole 2.5 mg oral tablet  take 1 tablet (2.5 mg) by oral route once daily   lisinopril 10 mg oral tablet 10/27/2020 take 1 tablet (10 mg) by oral route once daily   omeprazole 20 mg oral capsule,delayed release(DR/EC)  take 1 capsule (20 mg) by oral route once daily before a meal   paroxetine HCl 20 mg oral tablet 10/27/2020 take 1 tablet (20 mg) by oral route once daily for 90 days "   Prolia 60 mg/mL subcutaneous syringe 11/25/2020 inject 1 milliliter (60 mg) by subcutaneous route every 6 months in the upper arm, upper thigh or abdomen for 1 day         Allergy List  Allergen Name Date Reaction Notes   PENICILLINS --  --  --    SULFA (SULFONAMIDES) --  --  --          Family Medical History  Disease Name Relative/Age Notes   Alcohol abuse Father/   Father         Social History  Finding Status Start/Stop Quantity Notes   Alcohol Current - status unknown --/-- --  15 or more drinks per week   Exercises regularly --  --/-- --  0 times per week   Recreational Drug Use Never --/-- --  never used   Second hand smoke exposure Unknown --/-- --  no   Tobacco Never --/-- --  never uses other tobacco products   Uses seatbelts --  --/-- --  yes         Immunizations  NameDate Admin Mfg Trade Name Lot Number Route Inj VIS Given VIS Publication   HepA04/01/2019 MSD VAQTA-ADULT [id number] IM LD 04/01/2019    Comments: NDC 8198-2993-64   HepA07/10/2018 SKB HAVRIX-ADULT  IM  07/10/2018 07/01/2016   Comments: NDC [id number]   Vjgbseaug11/03/2018 PMC FLUZONE-HIGH DOSE [id number] IM [place] 10/03/2018 08/07/2015   Comments: ndc 30854-568-45   Wxpocqxxj0794/26/2018 MSD PNEUMOVAX 23 [id number] NE NE 10/27/2020    Comments:    Jkkjaxs4885/17/2016 WAL PREVNAR 13 [id number] NE NE 10/27/2020    Comments:    Kpxovuvn26/01/2014 NE Not Entered  NE NE 10/27/2020    Comments:    Tdap01/01/2014 [facility] ADACEL [id number] NE NE 10/27/2020    Comments:          Review of Systems  · Constitutional  o Admits  o : good general health lately  o Denies  o : fever, chills, body aches  · HENT  o Admits  o : mouth pain/burning  o Denies  o : breath odor, oral lesions, dry mouth, dry lips, dysphagia  · Cardiovascular  o Admits  o : high blood pressure when at the doctor 'I don't like doctors'  o Denies  o : chest pain, palpitations  · Respiratory  o Denies  o : shortness of breath, cough  · Gastrointestinal  o Admits  o : abdominal pain  o Denies  o :  "nausea, vomiting, diarrhea, constipation  · Genitourinary  o Admits  o : urgency, frequency, dysuria  o Denies  o : hematuria, urinary retention  · Neurologic  o Denies  o : tingling or numbness, loss of balance, dizziness  · Musculoskeletal  o Admits  o : back pain  o Denies  o : limitation of motion      Vitals  Date Time BP Position Site L\R Cuff Size HR RR TEMP (F) WT  HT  BMI kg/m2 BSA m2 O2 Sat FR L/min FiO2 HC       03/04/2021 12:43 /80 Sitting    79 - R  97.1 124lbs 8oz 4'  10\" 26.02 1.52 98 %            Physical Examination  · Constitutional  o Appearance  o : well developed, well-nourished, in no acute distress  · Head and Face  o HEENT  o : Unremarkable - wearing a mask - OP pink and moist - no oral lesions appreciated - tongue is pink and moist and no fissuring is noted  · Neck  o Inspection/Palpation  o : normal appearance, no masses or tenderness, trachea midline  o Thyroid  o : no thyromegaly  · Respiratory  o Respiratory Effort  o : breathing unlabored  o Auscultation of Lungs  o : clear to auscultation  · Cardiovascular  o Heart  o :   § Auscultation of Heart  § : regular rate, normal rhythm, no murmurs present  o Peripheral Vascular System  o :   § Extremities  § : no edema  · Gastrointestinal  o Abdominal Examination  o :   § Abdomen  § : bowel sounds present, non-distended, non-tender - no CVA tenderness bilaterally  · Musculoskeletal  o General  o :   § General Musculoskeletal  § : Muscle tone, strength, and development grossly normal.  · Skin and Subcutaneous Tissue  o General Inspection  o : no lesions present, no areas of discoloration, skin turgor normal, texture normal  · Neurologic  o Gait and Station  o :   § Gait Screening  § : normal gait  · Psychiatric  o Mood and Affect  o : mood normal, affect appropriate          Assessment  · Back pain     724.5/M54.9  · Oral pain     528.9/K13.79  · Abnormal urinalysis     791.9/R82.90  · Dysuria     788.1/R30.0      Plan  · Orders  o IOP - " Urinalysis without Microscopy (Cl[facility]k) [facility] (51278) - 724.5/M54.9 - 03/04/2021   glu neg, iam neg, ket neg, sg 1.015, blo trace, ph 7.5, pro neg, uro 0.2, nit neg, [facility]   o Urine culture (01820, 18085) - 791.9/R82.90 - 03/04/2021  o ACO-39: Current medications updated and reviewed (1159F, ) - - 03/04/2021  o B12 Folate levels (B12FO) - 528.9/K13.79 - 03/04/2021  · Medications  o Macrobid 100 mg oral capsule   SIG: take 1 capsule (100 mg) by oral route every 12 hours with food for 7 days   DISP: (14) Capsule with 0 refills  Prescribed on 03/04/2021     o Medications have been Reconciled  o Transition of Care or Provider Policy  · Instructions  o Patient was educated/instructed on their diagnosis, treatment and medications prior to discharge from the clinic today. Increase fluids - will treat for UTI and send UA for culture. Will call or RTC if no improvement in sxs. We will check a B12 for her mouth c/o - if normal, then consider follow up to further assess. Discussed HTN; however, patient reports white coat syndrome - denies having any trouble with blood pressure otherwise.             Electronically Signed by: ORLIN Calvillo -Author on March 4, 2021 01:30:21 PM

## 2021-05-07 NOTE — PROGRESS NOTES
Progress Note      Patient Name: Cristiane Bishop   Patient ID: [id number]   Sex: Female   YOB: 1940        Visit Date: October 27, 2020    Provider: ORLIN Patrick   Location: [facility]   Location Address: [address] [place] [address]   [place]  [address]   Location Phone: [phone]          Chief Complaint  · Annual Wellness Exam      History Of Present Illness  The patient is a 80 year old /White female who has come to this office for her Annual Wellness Visit.   Her Primary Care Provider is Bina ORDAZ. Her comprehensive Care Team list, including suppliers, has been updated on the Facesheet. Her medical/family history, height, weight, BMI, and blood pressure have been reviewed and are in the chart. The Health Risk Assessment has been completed and scanned in the chart.   Medications are listed in the medication list.   The active problem list includes: Allergic rhinitis, seasonal, Depression, Essential hypertension, GERD (gastroesophageal reflux disease), History of breast cancer, and Osteopenia   The patient does not have a history of substance use.   Patient reports her diet is adequate.   The Mini-Cog has been administered and is scanned in chart. The results are negative. Her cognitive function is without limitation. States difficulty remembering names.   A hearing loss screen was completed today and the result is negative.   Patient has the following risk factors for depression: past experience with depression. Patient completed the PHQ-9 today and it has been scanned in the chart. The total score is not completed today.   The Timed Up and Go screen was administered today and the result is negative. Time: 7.77 sec   The [place] Index of [place] in ADLs indicated full function (score of 6).   A Falls Risk Assessment has been completed, including a review of home fall hazards and medication review.   Overall, the patient's functional ability is noted by  this provider to be within normal limits. Her level of safety is noted to be within normal limits. Her balance/gait is within normal limits. There have been no falls in the past year. Patient-specific home safety recommendations have been reviewed and a copy has been given to patient.   She denies issues with leaking urine.   There are no additional risk factors identified.   Living Will/Advanced Directive previously executed but not in chart. Jose Luis Bishop, Franky, Jayy, and Miguel are all named per pt   Personalized health advice was given to the patient and a written health screening schedule was established; see Plan for details.   Cristiane Bishop is a 80 year old /White female who presents for evaluation and treatment of:      Pt states Dr. Bobo her Oncologist has retired and since it has been 5 years has told her that she may have us take over all her call     Osteoporosis: takes Prolia and due in Nov 2020 - last bone density June 2020     Had a bad tooth and had to have a root canal due to risk of infection       Past Medical History  Disease Name Date Onset Notes   Allergic rhinitis, seasonal --  --    GERD (gastroesophageal reflux disease) --  --          Past Surgical History  Procedure Name Date Notes   Hysterectomy --  --          Medication List  Name Date Started Instructions   Allergy oral  --    Calcium 600 + D(3) oral  --    Colace 100 mg oral capsule  take 1 capsule (100 mg) by oral route once daily   letrozole 2.5 mg oral tablet  take 1 tablet (2.5 mg) by oral route once daily   lisinopril 10 mg oral tablet 06/19/2020 take 1 tablet (10 mg) by oral route once daily   omeprazole 20 mg oral capsule,delayed release(DR/EC)  take 1 capsule (20 mg) by oral route once daily before a meal   paroxetine HCl 20 mg oral tablet 06/19/2020 take 1 tablet (20 mg) by oral route once daily for 90 days   Prolia 60 mg/mL subcutaneous syringe  inject 1 milliliter (60 mg) by subcutaneous route every 6  "months in the upper arm, upper thigh or abdomen         Allergy List  Allergen Name Date Reaction Notes   PENICILLINS --  --  --    SULFA (SULFONAMIDES) --  --  --          Family Medical History  Disease Name Relative/Age Notes   Alcohol abuse Father/   Father         Social History  Finding Status Start/Stop Quantity Notes   Alcohol Current - status unknown --/-- --  15 or more drinks per week   Exercises regularly --  --/-- --  0 times per week   Recreational Drug Use Never --/-- --  never used   Second hand smoke exposure Unknown --/-- --  no   Tobacco Never --/-- --  never uses other tobacco products   Uses seatbelts --  --/-- --  yes         Immunizations  NameDate Admin Mfg Trade Name Lot Number Route Inj VIS Given VIS Publication   HepA04/01/2019 MSD VAQTA-ADULT [id number] IM LD 04/01/2019    Comments: NDC 3112-3832-64   HepA07/10/2018 SKB HAVRIX-ADULT  IM  07/10/2018 07/01/2016   Comments: NDC [id number]   Zyvrbvcjw05/03/2018 PMC FLUZONE-HIGH DOSE [id number] IM LA 10/03/2018 08/07/2015   Comments: ndc 49222-953-56         Review of Systems  · Constitutional  o Denies  o : fever, chills, night sweats, weight loss, weight gain  · HENT  o Admits  o : nasal discharge  o Denies  o : lightheadedness, nasal congestion  · Breasts  o Denies  o : lumps, tenderness, swelling, nipple discharge  · Cardiovascular  o Denies  o : chest pain, palpitations, lightheadedness  · Respiratory  o Admits  o : cough with sinus drainage  o Denies  o : shortness of breath, wheezing  · Gastrointestinal  o Admits  o : constipation uses a stool softener and eats fruit  o Denies  o : nausea, vomiting, diarrhea  · Genitourinary  o Denies  o : frequency, dysuria, incontinence  · Psychiatric  o * See HPI      Vitals  Date Time BP Position Site L\R Cuff Size HR RR TEMP (F) WT  HT  BMI kg/m2 BSA m2 O2 Sat FR L/min FiO2 HC       10/27/2020 01:10 /84 Sitting    75 - R  96.6 120lbs 0oz 4'  10.5\" 24.65 1.5 97 %      10/27/2020 01:57 PM " 140/80 Sitting                       Physical Examination  · Constitutional  o Appearance  o : well-nourished, well developed, no obvious deformities present          Assessment  · Encounter for Medicare annual wellness exam     V70.0/Z00.00  · Osteopenia     733.90/M85.80  · Essential hypertension     401.9/I10      Plan  · Orders  o Falls Risk Assessment Completed (3288F) - V70.0/Z00.00 - 10/27/2020  o Brief hearing screening (written) [facility] () - V70.0/Z00.00 - 10/27/2020  o Annual Wellness Visit-includes a Personalized Prevention Plan of Service (PPS), SUBSEQUENT VISIT (Medicare) () - V70.0/Z00.00 - 10/27/2020  o ACO-13: Fall Risk Screening with no falls in past year or only one fall without injury in the past year (1101F) - V70.0/Z00.00 - 10/27/2020  o Presence or absence of urinary incontinence assessed (ISAK) (1090F) - V70.0/Z00.00 - 10/27/2020  o HTN/Lipid Panel (CMP, Lipid) [facility] (62896, 31654) - 401.9/I10 - 10/27/2020  o CBC with Auto Diff [facility] (67770) - 401.9/I10 - 10/27/2020  o ACO-39: Current medications updated and reviewed (8379F, ) - - 10/27/2020  o ACO-19: Colorectal cancer screening results documented and reviewed (3017F) - - 10/27/2020  o Influenza immunization was ordered or administered () - - 10/27/2020  o TSH [facility] (07332) - V70.0/Z00.00, 401.9/I10 - 10/27/2020  o Urinalysis with Reflex Microscopy if abnormal ([facility]) (86688) - V70.0/Z00.00, 401.9/I10 - 10/27/2020  o Magnesium level (58516) - 733.90/M85.80 - 10/27/2020  · Medications  o lisinopril 10 mg oral tablet   SIG: take 1 tablet (10 mg) by oral route once daily   DISP: (90) Tablet with 1 refills  Adjusted on 10/27/2020     o paroxetine HCl 20 mg oral tablet   SIG: take 1 tablet (20 mg) by oral route once daily for 90 days   DISP: (90) Tablet with 1 refills  Adjusted on 10/27/2020     o Medications have been Reconciled  o Transition of Care or Provider Policy  · Instructions  o Health Risk Assessment has been reviewed with the  patient.  o Written health screening schedule for next 5-10 years was established with patient; information scanned in chart and given/mailed to patient.  o Fall prevention methods discussed and a copy of recommendations given/mailed to patient.  o Patient was educated/instructed on their diagnosis, treatment and medications prior to discharge from the clinic today.  o Labs pending - will order Prolia when due pending labs.   · Disposition  o Follow up as needed.            Electronically Signed by: ORLIN Patrick -Author on October 27, 2020 05:51:46 PM

## 2021-05-07 NOTE — PROGRESS NOTES
Progress Note      Patient Name: Cristiane Bishop   Patient ID: [id number]   Sex: Female   YOB: 1940        Visit Date: July 10, 2018    Provider: BRENNAN Joseph   Location: [facility]   Location Address: [address] [place] [address]  [id number]   Location Phone: [phone]          Chief Complaint     PATIENT IS HERE BECAUSE SHE THINKS SHE MAY HAVE A UTI.  SHE IS GOING TO THE REST ROOM OFTEN. THIS STARTED ABOUT TWO WEEKS A GO.  SHE WOULD LIKE TO HAVE THE HEP A SHOT ALSO.       History Of Present Illness  Cristiane Bishop is a 78 year old /White female who presents for evaluation and treatment of:      s/s started approx. 2 weeks ago and lower back ache and urine freq--    hep A vaccine       Past Medical History  Disease Name Date Onset Notes   Allergic rhinitis, seasonal --  --    GERD (gastroesophageal reflux disease) --  --          Past Surgical History  Procedure Name Date Notes   Hysterectomy --  --          Medication List  Name Date Started Instructions   All Day Allergy (cetirizine) 10 mg oral tablet  take 1 tablet (10 mg) by oral route once daily   Colace 100 mg oral capsule  take 1 capsule (100 mg) by oral route once daily   Hair,Skin and Nails oral  --    letrozole 2.5 mg oral tablet  take 1 tablet (2.5 mg) by oral route once daily   lisinopril 10 mg oral tablet 05/18/2018 take 1 tablet (10 mg) by oral route once daily   omeprazole 20 mg oral capsule,delayed release(DR/EC)  take 1 capsule (20 mg) by oral route once daily before a meal   paroxetine HCl 20 mg oral tablet 04/20/2018 take 1 tablet (20 mg) by oral route once daily         Allergy List  Allergen Name Date Reaction Notes   PENICILLINS --  --  --    SULFA (SULFONAMIDES) --  --  --          Family Medical History  Disease Name Relative/Age Notes   Alcohol Abuse / Father    Father/          Social History  Finding Status Start/Stop Quantity Notes   Alcohol Current - status  "unknown --/-- --  15 or more drinks per week   Exercises regularly --  --/-- --  0 times per week   Recreational Drug Use Never --/-- --  never used   Second hand smoke exposure Unknown --/-- --  no   Tobacco Never --/-- --  never uses other tobacco products   Uses seatbelts --  --/-- --  yes         Immunizations  NameDate Admin Mfg Trade Name Lot Number Route Inj VIS Given VIS Publication   Jekhqhkbb84/26/2017 UNK Unknown TradeName [id number] [facility] 04/20/2018 08/07/2015   Comments:          Review of Systems  · Constitutional  o Denies  o : fever, chills  · HENT  o Denies  o : vertigo, recent head injury  · Cardiovascular  o Denies  o : chest pain, irregular heart beats  · Respiratory  o Denies  o : shortness of breath, productive cough  · Gastrointestinal  o Denies  o : nausea, vomiting  · Genitourinary  o Admits  o : urgency, frequency, dysuria, hematuria  o Denies  o : incontinence, urinary retention  · Integument  o Denies  o : hair growth change, new skin lesions  · Neurologic  o Denies  o : altered mental status, seizures  · Musculoskeletal  o Admits  o : back pain  · Endocrine  o Denies  o : cold intolerance, heat intolerance  · Heme-Lymph  o Denies  o : petechiae, lymph node enlargement or tenderness  · Allergic-Immunologic  o Denies  o : frequent illnesses      Vitals  Date Time BP Position Site L\R Cuff Size HR RR TEMP(F) WT  HT  BMI kg/m2 BSA m2 O2 Sat        07/10/2018 02:51 /60 Sitting    83 - R  97.5 120lbs 7oz 5'  0\" 23.52 1.52 98 %           Physical Examination  · Constitutional  o Appearance  o : well developed, well-nourished, in no acute distress  · Head and Face  o HEENT  o : Unremarkable  · Eyes  o Conjunctivae  o : conjunctivae normal  · Neck  o Inspection/Palpation  o : supple  o Thyroid  o : no thyromegaly  · Respiratory  o Respiratory Effort  o : breathing unlabored  · Cardiovascular  o Peripheral Vascular System  o :   § Extremities  § : no edema  · Gastrointestinal  o Abdominal " Examination  o :   § Abdomen  § : soft and nontender no suprapubic tenderness and no CVA tenderness   · Lymphatic  o Neck  o : no lymphadenopathy present  · Musculoskeletal  o General  o :   § General Musculoskeletal  § : No joint swelling or deformity., Muscle tone, strength, and development grossly normal.  · Skin and Subcutaneous Tissue  o General Inspection  o : , skin turgor normal, texture normal  · Neurologic  o Gait and Station  o :   § Gait Screening  § : normal gait  · Psychiatric  o Mood and Affect  o : mood normal, affect appropriate          Assessment  · Frequency of urination     788.41/R35.0  · Hematuria     599.70/R31.9  · Leukocytes in urine     791.7/R82.99  · Dysuria     788.1/R30.0  · Hepatitis A vaccination not up to date     V49.89/Z28.3  · Vaccine counseling     V65.49/Z71.89      Plan  · Orders  o IOP - Urinalysis without Microscopy (Clinitek) [facility] (19368) - 788.41/R35.0 - 07/10/2018   GLU-NEG SHARON-NEG KET-NEG SG-1.020 BLO-TRACE-INTACT PH-6.0 PRO-NEG URO-0.2 NIT-NEG JONE-SMALL  o Urine culture (21617, 58603) - 599.70/R31.9, 791.7/R82.99, 788.1/R30.0 - 07/10/2018  o ACO-39: Current medications updated and reviewed () - - 07/10/2018  o Havrix vaccine (99779) - V49.89/Z28.3, V65.49/Z71.89 - 07/10/2018   Vaccine - HepA; Dose: .5; Site: Left Upper Arm; Route: Intramuscular; Date: 07/10/2018 16:09:00; Exp: 11/21/2020; Lot: ; Mfg: [employer]; TradeName: Havrix Adult; Administered By: Blanca Conde MA; Comment: NDC [id number]  o IM - Injection Fee (34129) - V49.89/Z28.3, V65.49/Z71.89 - 07/10/2018  · Medications  o Macrobid 100 mg oral capsule   SIG: take 1 capsule (100 mg) by oral route every 12 hours with food for 7 days   DISP: (14) capsules with 0 refills  Prescribed on 07/10/2018     · Instructions  o Take all medications as prescribed/directed.  o Rest. Increase Fluids.  o Patient was educated/instructed on their diagnosis, treatment and medications prior to discharge from the  clinic today.  o Patient instructed to seek medical attention urgently for new or worsening symptoms.  o Call the office with any concerns or questions.  o Chronic conditions reviewed and taken into consideration for today's treatment plan.  · Disposition  o Call or Return if symptoms worsen or persist.  o Return Visit Request in/on 6 months +/- 2 days (5621).            Electronically Signed by: BRENNAN Joseph -Author on July 10, 2018 04:40:26 PM

## 2021-05-07 NOTE — PROGRESS NOTES
Progress Note      Patient Name: Cristiane Bishop   Patient ID: [id number]   Sex: Female   YOB: 1940        Visit Date: April 8, 2021    Provider: ORLIN Calvillo   Location: [facility]   Location Address: [address] [place] [address]   [place]  [address]   Location Phone: [phone]          Chief Complaint     4 days of constant left heel pain       History Of Present Illness  Cristiane Bishop is a 80 year old /White female who presents for evaluation and treatment of:      left heel pain since last Friday - no known injury - she thought maybe it could be a heel spur or maybe plantar fasciitis  - she watches her grandkids and is having a hard time keeping up with them    Pain is not present upon standing first thing in the AM, or after getting up following prolonged periods of standing. Pain is present constantly and worse with weight-bearing.       Past Medical History  Disease Name Date Onset Notes   Allergic rhinitis, seasonal --  --    Depression --  --    Essential hypertension --  --    GERD (gastroesophageal reflux disease) --  --    History of breast cancer --  --    Osteopenia --  --          Past Surgical History  Procedure Name Date Notes   Hysterectomy --  --          Medication List  Name Date Started Instructions   Allergy oral  --    Calcium 600 + D(3) oral  --    Colace 100 mg oral capsule  take 1 capsule (100 mg) by oral route once daily   ferrous sulfate 325 mg (65 mg iron) oral tablet 03/08/2021 take 1 tablet (325 mg) by oral route once daily for 30 days   letrozole 2.5 mg oral tablet  take 1 tablet (2.5 mg) by oral route once daily   lisinopril 10 mg oral tablet 10/27/2020 take 1 tablet (10 mg) by oral route once daily   PANTOPRAZOLE 40MG TABLETS 04/02/2021 TAKE 1 TABLET BY MOUTH EVERY DAY   paroxetine HCl 20 mg oral tablet 10/27/2020 take 1 tablet (20 mg) by oral route once daily for 90 days   Prolia 60 mg/mL subcutaneous syringe 11/25/2020  inject 1 milliliter (60 mg) by subcutaneous route every 6 months in the upper arm, upper thigh or abdomen for 1 day   Protonix 40 mg oral tablet,delayed release (/EC) 03/18/2021 take 1 tablet (40 mg) by oral route once daily for 30 days         Allergy List  Allergen Name Date Reaction Notes   PENICILLINS --  --  --    SULFA (SULFONAMIDES) --  --  --    tetracycline --  --  --        Allergies Reconciled  Family Medical History  Disease Name Relative/Age Notes   Alcohol abuse Father/   Father         Social History  Finding Status Start/Stop Quantity Notes   Alcohol Current - status unknown --/-- --  15 or more drinks per week   Exercises regularly --  --/-- --  0 times per week   Recreational Drug Use Never --/-- --  never used   Second hand smoke exposure Unknown --/-- --  no   Tobacco Never --/-- --  never uses other tobacco products   Uses seatbelts --  --/-- --  yes         Immunizations  NameDate Admin Mfg Trade Name Lot Number Route Inj VIS Given VIS Publication   COVID Zsutmf1403/19/2021 NE Not Entered  NE NE 04/08/2021    Comments:    COVID Szqabz4502/26/2021 NE Not Entered  NE NE 04/08/2021    Comments:    HepA04/01/2019 MSD VAQTA-ADULT [id number] IM LD 04/01/2019    Comments: NDC 6922-9437-82   HepA07/10/2018 SKB HAVRIX-ADULT  IM  07/10/2018 07/01/2016   Comments: NDC [id number]   Sqtrhsaaf11/03/2018 PMC FLUZONE-HIGH DOSE [id number] IM LA 10/03/2018 08/07/2015   Comments: ndc 77738-091-38   Vqbbuoabe0317/26/2018 MSD PNEUMOVAX 23 [id number] NE NE 10/27/2020    Comments:    Fwcgyov0752/17/2016 [facility]AL PREVNAR 13 [id number] NE NE 10/27/2020    Comments:    Efarbkuc48/01/2014 NE Not Entered  NE NE 10/27/2020    Comments:    Tdap01/01/2014 [facility] ADACEL [id number] NE NE 10/27/2020    Comments:          Review of Systems  · Constitutional  o Admits  o : good general health lately  · Neurologic  o Denies  o : tingling or numbness, loss of balance  · Musculoskeletal  o Admits  o : foot pain      Vitals  Date Time BP Position Site  "L\R Cuff Size HR RR TEMP (F) WT  HT  BMI kg/m2 BSA m2 O2 Sat FR L/min FiO2 HC       04/08/2021 12:59 /84 Sitting    84 - R  97.8 125lbs 16oz 4'  11\" 25.45 1.54 96 %            Physical Examination  · Constitutional  o Appearance  o : well developed, well-nourished, in no acute distress  · Respiratory  o Respiratory Effort  o : breathing unlabored  o Auscultation of Lungs  o : clear to auscultation  · Cardiovascular  o Heart  o :   § Auscultation of Heart  § : regular rate, normal rhythm, no murmurs present  o Peripheral Vascular System  o :   § Extremities  § : no edema  · Lymphatic  o Neck  o : no lymphadenopathy present  · Musculoskeletal  o General  o :   § General Musculoskeletal  § : Muscle tone, strength, and development grossly normal. No gross deformity of the left foot - she is TTP directly over the heel, plantar aspect - ROM of the foot is WNL  · Skin and Subcutaneous Tissue  o General Inspection  o : no lesions present, no areas of discoloration, skin turgor normal, texture normal  · Neurologic  o Gait and Station  o :   § Gait Screening  § : normal gait  · Psychiatric  o Mood and Affect  o : mood normal, affect appropriate          Assessment  · Pain of left heel     729.5/M79.672  · Calcaneal spur     726.73/M77.30      Plan  · Orders  o ACO-39: Current medications updated and reviewed (1159F, ) - - 04/08/2021  o Foot (Left) 3 or more views X-Ray [facility] ([id number]-TY) - 729.5/M79.672 - 04/08/2021  o PODIATRY CONSULTATION (PODIA) - 729.5/M79.672, 726.73/M77.30 - 04/08/2021   Dr. Farah per patient  · Medications  o Medications have been Reconciled  o Transition of Care or Provider Policy  · Instructions  o Patient was educated/instructed on their diagnosis, treatment and medications prior to discharge from the clinic today.            Electronically Signed by: ORLIN Calvillo -Author on April 8, 2021 02:12:32 PM  "

## 2021-05-07 NOTE — PROGRESS NOTES
Progress Note      Patient Name: Cristiane Bishop   Patient ID: [id number]   Sex: Female   YOB: 1940        Visit Date: May 1, 2020    Provider: Ave Ribeiro DO   Location: [facility]   Location Address: [address] [place] [address]   [place]  [address]   Location Phone: [phone]          Chief Complaint     COUGH, YEAGER AND SINUS PAIN       History Of Present Illness  Cristiane Bishop is a 79 year old /White female who presents for evaluation and treatment of:      1.) COUGH, YEAGER AND SINUS PAIN : The patient presents with complaints of a cough. She reports onset of sxs of a cough 1 month ago. Non-productive cough. History of seasonal and environmental allergies. No history of COPD. Now complaining of sinus congestion and pain. Onset was 1 week ago.       Past Medical History  Disease Name Date Onset Notes   Allergic rhinitis, seasonal --  --    GERD (gastroesophageal reflux disease) --  --          Past Surgical History  Procedure Name Date Notes   Hysterectomy --  --          Medication List  Name Date Started Instructions   Calcium 600 + D(3) oral  --    Colace 100 mg oral capsule  take 1 capsule (100 mg) by oral route once daily   letrozole 2.5 mg oral tablet  take 1 tablet (2.5 mg) by oral route once daily   lisinopril 10 mg oral tablet 09/26/2019 take 1 tablet (10 mg) by oral route once daily   omeprazole 20 mg oral capsule,delayed release(DR/EC)  take 1 capsule (20 mg) by oral route once daily before a meal   paroxetine HCl 20 mg oral tablet 09/26/2019 take 1 tablet (20 mg) by oral route once daily for 90 days   Prolia 60 mg/mL subcutaneous syringe  inject 1 milliliter (60 mg) by subcutaneous route every 6 months in the upper arm, upper thigh or abdomen         Allergy List  Allergen Name Date Reaction Notes   PENICILLINS --  --  --    SULFA (SULFONAMIDES) --  --  --        Allergies Reconciled  Family Medical History  Disease Name Relative/Age Notes   Alcohol  abuse Father/   Father         Social History  Finding Status Start/Stop Quantity Notes   Alcohol Current - status unknown --/-- --  15 or more drinks per week   Exercises regularly --  --/-- --  0 times per week   Recreational Drug Use Never --/-- --  never used   Second hand smoke exposure Unknown --/-- --  no   Tobacco Never --/-- --  never uses other tobacco products   Uses seatbelts --  --/-- --  yes         Immunizations  NameDate Admin Mfg Trade Name Lot Number Route Inj VIS Given VIS Publication   HepA04/01/2019 MSD VAQTA-ADULT [id number] IM LD 04/01/2019    Comments: NDC 7245-8481-92   HepA07/10/2018 SKB HAVRIX-ADULT  IM  07/10/2018 07/01/2016   Comments: NDC [id number]   Fuusojcbr38/03/2018 PMC FLUZONE-HIGH DOSE [id number] IM LA 10/03/2018 08/07/2015   Comments: ndc 52913-346-36   Mqkptamid03/26/2017 UNK Unknown TradeName [id number] IM UKN 04/20/2018 08/07/2015   Comments:          Review of Systems  · Constitutional  o Denies  o : fever, chills  · Eyes  o Denies  o : discharge from eye, eye discomfort  · HENT  o Admits  o : headaches, sinus pain  · Cardiovascular  o Denies  o : chest pain, syncope  · Respiratory  o Admits  o : cough  o Denies  o : abnormal sputum production, hemoptysis  · Gastrointestinal  o Denies  o : nausea, vomiting  · Integument  o Denies  o : rash, itching  · Neurologic  o Denies  o : altered mental status, tingling or numbness  · Musculoskeletal  o Denies  o : joint pain, joint swelling  · Psychiatric  o Denies  o : hallucinations, delusions      Vitals  Date Time BP Position Site L\R Cuff Size HR RR TEMP (F) WT  HT  BMI kg/m2 BSA m2 O2 Sat        05/01/2020 09:52 /94 Sitting    95 - R  97.2     98 %          Physical Examination  · Constitutional  o Appearance  o : alert, in no acute distress  · Eyes  o Conjunctivae  o : conjunctivae normal  · Ears, Nose, Mouth and Throat  o Ears  o :   § External Ears  § : appearance within normal limits, no lesions present  § Otoscopic  Examination  § : tympanic membrane appearance within normal limits bilaterally  § Hearing  § : intact to conversational voice both ears  o Nose  o :   § External Nose  § : appearance normal  o Oral Cavity  o :   § Oral Mucosa  § : oral mucosa normal without pallor or cyanosis  § Lips  § : lip appearance normal  § Teeth  § : normal dentition for age  § Gums  § : gums pink, non-swollen, no bleeding present  § Tongue  § : tongue appearance normal  § Palate  § : hard palate normal, soft palate appearance normal  o Throat  o :   § Oropharynx  § : no inflammation or lesions present, tonsils within normal limits  · Neck  o Inspection/Palpation  o : supple  · Respiratory  o Respiratory Effort  o : breathing unlabored  o Auscultation of Lungs  o : normal breath sounds  · Cardiovascular  o Heart  o :   § Auscultation of Heart  § : regular rate, normal rhythm  o Peripheral Vascular System  o :   § Extremities  § : no edema  · Neurologic  o Gait and Station  o :   § Gait Screening  § : normal gait  · Psychiatric  o Mood and Affect  o : mood normal, affect appropriate              Assessment  · Cough     786.2/R05    A.) Antitussives as noted.     · Acute sinus infection     461.9/J01.90    A.) Abx as noted due to duration of sxs. Adequate rest and fluids. Contact provider as needed.       Problems Reconciled  Plan  · Orders  o ACO-39: Current medications updated and reviewed () - - 05/01/2020  · Medications  o Tessalon Perles 100 mg oral capsule   SIG: take 1 capsule (100 mg) by oral route 3 times per day as needed for cough for 7 days   DISP: (21) capsule with 0 refills  Prescribed on 05/01/2020     o promethazine 6.25 mg/5 mL oral syrup   SIG: take 10 milliliters by oral route daily Take at bedtime   DISP: (120) milliliters with 0 refills  Prescribed on 05/01/2020     o doxycycline monohydrate 100 mg oral tablet   SIG: take 1 tablet by oral route 2 times a day for 5 days   DISP: (10) tablets with 0 refills  Prescribed on  05/01/2020     o Medications have been Reconciled  o Transition of Care or Provider Policy  · Instructions  o Patient was educated/instructed on their diagnosis, treatment and medications prior to discharge from the clinic today.  · Disposition  o Call or Return if symptoms worsen or persist.            Electronically Signed by: Ave Ribeiro DO - on May 1, 2020 10:12:14 AM

## 2021-05-07 NOTE — PROGRESS NOTES
Progress Note      Patient Name: Cristiane Bishop   Patient ID: [id number]   Sex: Female   YOB: 1940        Visit Date: March 18, 2021    Provider: Rodger Jones MD   [place] [facility] [place]   Location Address: [address] [place] [address] [place]  [address]   Location Phone: [phone]          Chief Complaint     FOLLOW UP ON TESTS       History Of Present Illness  Cristiane Bishop is a 80 year old /White female who presents for evaluation and treatment of:      discussed labs  h. pylori positive- started quadruple therapy  iron deficiency  yeast infection has cleared, per pt- her symptoms of UTI have resolved       Past Medical History  Disease Name Date Onset Notes   Allergic rhinitis, seasonal --  --    Depression --  --    Essential hypertension --  --    GERD (gastroesophageal reflux disease) --  --    History of breast cancer --  --    Osteopenia --  --          Past Surgical History  Procedure Name Date Notes   Hysterectomy --  --          Medication List  Name Date Started Instructions   Allergy oral  --    Calcium 600 + D(3) oral  --    Colace 100 mg oral capsule  take 1 capsule (100 mg) by oral route once daily   Diflucan 200 mg oral tablet 03/06/2021 take 1 tablet (200 mg) by oral route once daily for 5 days   ferrous sulfate 325 mg (65 mg iron) oral tablet 03/08/2021 take 1 tablet (325 mg) by oral route once daily for 30 days   letrozole 2.5 mg oral tablet  take 1 tablet (2.5 mg) by oral route once daily   lisinopril 10 mg oral tablet 10/27/2020 take 1 tablet (10 mg) by oral route once daily   paroxetine HCl 20 mg oral tablet 10/27/2020 take 1 tablet (20 mg) by oral route once daily for 90 days   Prolia 60 mg/mL subcutaneous syringe 11/25/2020 inject 1 milliliter (60 mg) by subcutaneous route every 6 months in the upper arm, upper thigh or abdomen for 1 day   Protonix 40 mg oral tablet,delayed release (/EC) 03/06/2021 take 1 tablet (40 mg) by oral route  "once daily for 30 days         Allergy List  Allergen Name Date Reaction Notes   PENICILLINS --  --  --    SULFA (SULFONAMIDES) --  --  --          Family Medical History  Disease Name Relative/Age Notes   Alcohol abuse Father/   Father         Social History  Finding Status Start/Stop Quantity Notes   Alcohol Current - status unknown --/-- --  15 or more drinks per week   Exercises regularly --  --/-- --  0 times per week   Recreational Drug Use Never --/-- --  never used   Second hand smoke exposure Unknown --/-- --  no   Tobacco Never --/-- --  never uses other tobacco products   Uses seatbelts --  --/-- --  yes         Immunizations  NameDate Admin Mfg Trade Name Lot Number Route Inj VIS Given VIS Publication   HepA04/01/2019 MSD VAQTA-ADULT [id number] IM LD 04/01/2019    Comments: NDC 4484-4577-47   HepA07/10/2018 SKB HAVRIX-ADULT  IM  07/10/2018 07/01/2016   Comments: NDC [id number]   Oapgtyfws36/03/2018 PMC FLUZONE-HIGH DOSE [id number] IM LA 10/03/2018 08/07/2015   Comments: ndc 51149-246-88   Usznfivqa1426/26/2018 MSD PNEUMOVAX 23 [id number] NE NE 10/27/2020    Comments:    Jdvtyxh3475/17/2016 [facility] PREVNAR 13 [id number] NE NE 10/27/2020    Comments:    Emgvghfx77/01/2014 NE Not Entered  NE NE 10/27/2020    Comments:    Tdap01/01/2014 [facility] ADACEL [id number] NE NE 10/27/2020    Comments:          Review of Systems  · Constitutional  o Admits  o : fatigue  o Denies  o : fever, chills  · Cardiovascular  o Denies  o : chest pain, palpitations  · Respiratory  o Denies  o : shortness of breath, cough  · Gastrointestinal  o Denies  o : nausea, vomiting, diarrhea, abdominal pain  · Psychiatric  o Denies  o : anxiety, depression      Vitals  Date Time BP Position Site L\R Cuff Size HR RR TEMP (F) WT  HT  BMI kg/m2 BSA m2 O2 Sat FR L/min FiO2 HC       03/18/2021 05:00 /80 Sitting    84 - R  98 125lbs 8oz 4'  11\" 25.35 1.54 98 %            Physical Examination  · Constitutional  o Appearance  o : well developed, " well-nourished, in no acute distress  · Respiratory  o Respiratory Effort  o : breathing unlabored  o Auscultation of Lungs  o : clear to ascultation  · Cardiovascular  o Heart  o :   § Auscultation of Heart  § : regular rate and rhythm  o Peripheral Vascular System  o :   § Extremities  § : no edema  · Gastrointestinal  o Abdomen  o : soft, non-tender, non-distended, + bowel sounds, no hepatosplenomegaly, no masses palpated  · Musculoskeletal  o General  o :   § General Musculoskeletal  § : No joint swelling or deformity., Muscle tone, strength, and development grossly normal.  · Neurologic  o Gait and Station  o :   § Gait Screening  § : normal gait  · Psychiatric  o Mood and Affect  o : mood normal, affect appropriate          Assessment  · H. pylori infection     041.86/A04.8  · Iron deficiency     280.9/E61.1      Plan  · Orders  o CBC with Auto Diff [facility] (22386) - 041.86/A04.8, 280.9/E61.1 - 03/18/2021  o ACO-39: Current medications updated and reviewed (1159F, ) - - 03/18/2021  o Hematology / Oncology Consult (HEMOC) - 280.9/E61.1 - 03/18/2021  o Stool occult blood screen by immunoassay (46839) - 280.9/E61.1 - 03/18/2021  · Medications  o bismuth subsalicylate 262 mg/15 mL oral suspension   SIG: take 30 milliliters by oral route 3 times a day for 14 days   DISP: (1260) Milliliter with 0 refills  Prescribed on 03/18/2021     o metronidazole 500 mg oral tablet   SIG: take 1 tablet by oral route 3 times a day for 14 days   DISP: (42) Tablet with 0 refills  Prescribed on 03/18/2021     o tetracycline 500 mg oral capsule   SIG: take 1 capsule by oral route 3 times a day for 14 days   DISP: (42) Capsule with 0 refills  Prescribed on 03/18/2021     o Protonix 40 mg oral tablet,delayed release (DR/EC)   SIG: take 1 tablet (40 mg) by oral route once daily for 30 days   DISP: (30) Tablet with 0 refills  Adjusted on 03/18/2021     o Medications have been Reconciled  o Transition of Care or Provider  Policy  · Instructions  o Patient was educated/instructed on their diagnosis, treatment and medications prior to discharge from the clinic today.            Electronically Signed by: Rodger Jones MD -Author on March 18, 2021 06:12:12 PM

## 2021-05-07 NOTE — PROGRESS NOTES
Progress Note      Patient Name: Cristiane Bishop   Patient ID: [id number]   Sex: Female   YOB: 1940        Visit Date: April 1, 2019    Provider: ORLIN Patrick   Location: [facility]   Location Address: [address] [place] [address]  [id number]   Location Phone: [phone]          Chief Complaint     Medication refill       History Of Present Illness  Cristiane Bishop is a 78 year old /White female who presents for evaluation and treatment of:      refill of medication (Lisinopril & Paxil)    HTN: stable - no issues with medication - denies HA, CP, palpitations, lightheaded or dizzy    Depression: will cry easily without medication - medication keeps mood stable     lots of sinus drainage     Hep A: initial injection was 7/10/18 - no issues with injection    Shingles vaccine:       Past Medical History  Disease Name Date Onset Notes   Allergic rhinitis, seasonal --  --    GERD (gastroesophageal reflux disease) --  --          Past Surgical History  Procedure Name Date Notes   Hysterectomy --  --          Medication List  Name Date Started Instructions   Colace 100 mg oral capsule  take 1 capsule (100 mg) by oral route once daily   letrozole 2.5 mg oral tablet  take 1 tablet (2.5 mg) by oral route once daily   lisinopril 10 mg oral tablet 04/01/2019 take 1 tablet (10 mg) by oral route once daily   omeprazole 20 mg oral capsule,delayed release(DR/EC)  take 1 capsule (20 mg) by oral route once daily before a meal   paroxetine HCl 20 mg oral tablet 04/01/2019 take 1 tablet (20 mg) by oral route once daily for 90 days   Shingrix (PF) 50 mcg/0.5 mL intramuscular suspension for reconstitution 10/03/2018 inject 0.5 milliliter (50 mcg) by IM once repeat 0.5 mL dose 2 to 6 months after the first dose (total of 2 doses)         Allergy List  Allergen Name Date Reaction Notes   PENICILLINS --  --  --    SULFA (SULFONAMIDES) --  --  --          [facility]  History  Disease Name Relative/Age Notes   Alcohol Abuse Father/   Father         Social History  Finding Status Start/Stop Quantity Notes   Alcohol Current - status unknown --/-- --  15 or more drinks per week   Exercises regularly --  --/-- --  0 times per week   Recreational Drug Use Never --/-- --  never used   Second hand smoke exposure Unknown --/-- --  no   Tobacco Never --/-- --  never uses other tobacco products   Uses seatbelts --  --/-- --  yes         Immunizations  NameDate Admin Mfg Trade Name Lot Number Route Inj VIS Given VIS Publication   HepA04/01/2019 MSD VAQTA-ADULT [id number] IM LD 04/01/2019    Comments: NDC 1773-6724-10   HepA07/10/2018 SKB HAVRIX-ADULT  IM  07/10/2018 07/01/2016   Comments: NDC [id number]   Ugiucrniq72/03/2018 PMC FLUZONE-HIGH DOSE [id number] IM LA 10/03/2018 08/07/2015   Comments: ndc 29084-634-88   Xxjxjytig15/26/2017 UNK Unknown TradeName [id number] IM UKN 04/20/2018 08/07/2015   Comments:          Review of Systems  · Constitutional  o Denies  o : fever, headache, chills, body aches  · Cardiovascular  o Denies  o : chest pain, palpitations  · Respiratory  o Denies  o : shortness of breath, wheezing, cough      Vitals  Date Time BP Position Site L\R Cuff Size HR RR TEMP (F) WT  HT  BMI kg/m2 BSA m2 O2 Sat        04/01/2019 10:19 /82 Sitting    92 - R  97.7 122lbs 5oz    98 %          Physical Examination  · Constitutional  o Appearance  o : well developed, well-nourished, in no acute distress  · Eyes  o Conjunctivae  o : conjunctivae normal  o Pupils and Irises  o : pupils equal and round, pupils reactive to light bilaterally  · Neck  o Inspection/Palpation  o : supple  o Thyroid  o : no thyromegaly  · Respiratory  o Respiratory Effort  o : breathing unlabored  o Auscultation of Lungs  o : clear to ascultation  · Cardiovascular  o Heart  o :   § Auscultation of Heart  § : regular rate and rhythm  o Peripheral Vascular System  o :   § Extremities  § : no  edema  · Lymphatic  o Neck  o : no lymphadenopathy present  · Musculoskeletal  o General  o :   § General Musculoskeletal  § : No joint swelling or deformity. Muscle tone, strength, and development grossly normal.  · Skin and Subcutaneous Tissue  o General Inspection  o : NL tone  · Neurologic  o Gait and Station  o :   § Gait Screening  § : normal gait  · Psychiatric  o Mood and Affect  o : mood normal, affect appropriate              Assessment  · Essential hypertension     401.9/I10  · Major depressive disorder     296.20/F32.2  · Encounter for administration of vaccine     V05.9/Z23      Plan  · Orders  o CBC with Auto Diff [facility] (58502) - 401.9/I10 - 04/01/2019  o CMP [facility] (52274) - 401.9/I10 - 04/01/2019  o Lipid Panel [facility] (11226) - 401.9/I10 - 04/01/2019  o Urinalysis with Reflex Microscopy if abnormal ([facility]) (74093) - 401.9/I10 - 04/01/2019  o ACO-39: Current medications updated and reviewed () - - 04/01/2019  o Havrix Adult Vaccine (17354) - V05.9/Z23 - 04/01/2019   Vaccine - HepA; Dose: 1.0; Site: Left Deltoid; Route: Intramuscular; Date: 04/01/2019 11:04:00; Exp: 05/12/2020; Lot: [id number]; Mfg: [employer] TradeName: VAQTA-ADULT; Administered By: Humza Miller MA; Comment: NDC 0993-2808-35  · Medications  o lisinopril 10 mg oral tablet   SIG: take 1 tablet (10 mg) by oral route once daily   DISP: (90) tablets with 1 refills  Adjusted on 04/01/2019     o paroxetine HCl 20 mg oral tablet   SIG: take 1 tablet (20 mg) by oral route once daily for 90 days   DISP: (90) tablet with 1 refills  Adjusted on 04/01/2019     o benzonatate 200 mg oral capsule   SIG: take 1 capsule (200 mg) by oral route 3 times per day as needed for cough   DISP: (30) capsules with 0 refills  Discontinued on 04/01/2019     o montelukast 10 mg oral tablet   SIG: take 1 tablet (10 mg) by oral route once daily in the evening   DISP: (30) tablets with 0 refills  Discontinued on 04/01/2019     o Mucinex D  mg oral tablet  extended release 12 hr   SIG: ---   DISP: (0) tablet with 0 refills  Discontinued on 04/01/2019     · Instructions  o Patient advised to monitor blood pressure (B/P) at home and journal readings. Patient informed that a B/P reading at home of more than 135/85 is considered hypertension. For readings greater eeve614/90 or higher patient is advised to follow up in the office with readings for management. Patient advised to limit sodium intake.  o Patient was educated/instructed on their diagnosis, treatment and medications prior to discharge from the clinic today.  o Chronic conditions reviewed and taken into consideration for today's treatment plan.  · Disposition  o Follow up as needed.            Electronically Signed by: ORLIN Patrick -Author on April 1, 2019 06:49:46 PM

## 2021-05-09 VITALS
HEART RATE: 95 BPM | DIASTOLIC BLOOD PRESSURE: 94 MMHG | SYSTOLIC BLOOD PRESSURE: 168 MMHG | OXYGEN SATURATION: 98 % | TEMPERATURE: 97.2 F

## 2021-05-09 VITALS
BODY MASS INDEX: 24.39 KG/M2 | OXYGEN SATURATION: 93 % | HEART RATE: 65 BPM | DIASTOLIC BLOOD PRESSURE: 74 MMHG | TEMPERATURE: 97.3 F | HEIGHT: 59 IN | SYSTOLIC BLOOD PRESSURE: 120 MMHG | WEIGHT: 121 LBS

## 2021-05-09 VITALS
SYSTOLIC BLOOD PRESSURE: 158 MMHG | HEART RATE: 84 BPM | HEIGHT: 59 IN | DIASTOLIC BLOOD PRESSURE: 80 MMHG | OXYGEN SATURATION: 98 % | TEMPERATURE: 98 F | BODY MASS INDEX: 25.3 KG/M2 | WEIGHT: 125.5 LBS

## 2021-05-09 VITALS
BODY MASS INDEX: 23.65 KG/M2 | TEMPERATURE: 97.5 F | SYSTOLIC BLOOD PRESSURE: 128 MMHG | HEART RATE: 83 BPM | WEIGHT: 120.44 LBS | DIASTOLIC BLOOD PRESSURE: 60 MMHG | HEIGHT: 60 IN | OXYGEN SATURATION: 98 %

## 2021-05-09 VITALS
SYSTOLIC BLOOD PRESSURE: 144 MMHG | TEMPERATURE: 98.9 F | HEART RATE: 75 BPM | SYSTOLIC BLOOD PRESSURE: 158 MMHG | WEIGHT: 121.37 LBS | OXYGEN SATURATION: 98 % | DIASTOLIC BLOOD PRESSURE: 92 MMHG | DIASTOLIC BLOOD PRESSURE: 86 MMHG | BODY MASS INDEX: 25.48 KG/M2 | HEIGHT: 58 IN

## 2021-05-09 VITALS
HEIGHT: 60 IN | WEIGHT: 122 LBS | SYSTOLIC BLOOD PRESSURE: 142 MMHG | DIASTOLIC BLOOD PRESSURE: 82 MMHG | BODY MASS INDEX: 23.95 KG/M2 | OXYGEN SATURATION: 100 % | TEMPERATURE: 97.4 F | HEART RATE: 81 BPM

## 2021-05-09 VITALS
HEART RATE: 75 BPM | HEIGHT: 58 IN | BODY MASS INDEX: 25.19 KG/M2 | DIASTOLIC BLOOD PRESSURE: 84 MMHG | WEIGHT: 120 LBS | TEMPERATURE: 96.6 F | OXYGEN SATURATION: 97 % | SYSTOLIC BLOOD PRESSURE: 160 MMHG

## 2021-05-09 VITALS
WEIGHT: 125 LBS | SYSTOLIC BLOOD PRESSURE: 134 MMHG | HEART RATE: 79 BPM | OXYGEN SATURATION: 95 % | DIASTOLIC BLOOD PRESSURE: 84 MMHG | TEMPERATURE: 96.6 F

## 2021-05-09 VITALS
SYSTOLIC BLOOD PRESSURE: 160 MMHG | DIASTOLIC BLOOD PRESSURE: 84 MMHG | WEIGHT: 122.06 LBS | SYSTOLIC BLOOD PRESSURE: 168 MMHG | DIASTOLIC BLOOD PRESSURE: 88 MMHG | TEMPERATURE: 98.4 F | OXYGEN SATURATION: 97 % | HEART RATE: 98 BPM

## 2021-05-09 VITALS
DIASTOLIC BLOOD PRESSURE: 84 MMHG | TEMPERATURE: 97.8 F | OXYGEN SATURATION: 96 % | SYSTOLIC BLOOD PRESSURE: 160 MMHG | HEIGHT: 59 IN | WEIGHT: 126 LBS | BODY MASS INDEX: 25.4 KG/M2 | HEART RATE: 84 BPM

## 2021-05-09 VITALS
TEMPERATURE: 97.6 F | DIASTOLIC BLOOD PRESSURE: 88 MMHG | HEART RATE: 91 BPM | WEIGHT: 122.5 LBS | OXYGEN SATURATION: 96 % | SYSTOLIC BLOOD PRESSURE: 148 MMHG

## 2021-05-09 VITALS
OXYGEN SATURATION: 96 % | TEMPERATURE: 97.6 F | SYSTOLIC BLOOD PRESSURE: 144 MMHG | HEART RATE: 76 BPM | WEIGHT: 124.37 LBS | DIASTOLIC BLOOD PRESSURE: 84 MMHG | BODY MASS INDEX: 26.1 KG/M2 | HEIGHT: 58 IN

## 2021-05-09 VITALS
HEART RATE: 87 BPM | TEMPERATURE: 97.2 F | WEIGHT: 122.5 LBS | OXYGEN SATURATION: 99 % | DIASTOLIC BLOOD PRESSURE: 80 MMHG | SYSTOLIC BLOOD PRESSURE: 144 MMHG

## 2021-05-09 VITALS
SYSTOLIC BLOOD PRESSURE: 124 MMHG | WEIGHT: 122.31 LBS | DIASTOLIC BLOOD PRESSURE: 82 MMHG | TEMPERATURE: 97.7 F | OXYGEN SATURATION: 98 % | HEART RATE: 92 BPM

## 2021-05-09 VITALS
SYSTOLIC BLOOD PRESSURE: 140 MMHG | HEART RATE: 85 BPM | OXYGEN SATURATION: 99 % | WEIGHT: 120 LBS | DIASTOLIC BLOOD PRESSURE: 80 MMHG

## 2021-05-09 VITALS
HEIGHT: 58 IN | TEMPERATURE: 97.1 F | HEART RATE: 79 BPM | WEIGHT: 124.5 LBS | DIASTOLIC BLOOD PRESSURE: 80 MMHG | OXYGEN SATURATION: 98 % | BODY MASS INDEX: 26.13 KG/M2 | SYSTOLIC BLOOD PRESSURE: 165 MMHG

## 2021-05-10 ENCOUNTER — OFFICE VISIT CONVERTED (OUTPATIENT)
Dept: PODIATRY | Facility: CLINIC | Age: 81
End: 2021-05-10
Attending: PODIATRIST

## 2021-05-11 NOTE — H&P
History and Physical      Patient Name: Cristiane Bishop   Patient ID: [id number]   Sex: Female   YOB: 1940    Primary Care Provider: Rodger Jones MD   Referring Provider: Rodger Jones MD    Visit Date: April 22, 2021    Provider: Rush Jewell DPM   Location: [facility]   Location Address: [address] [place] [address]  [id number]   Location Phone: [phone]          Chief Complaint  · Left Foot Pain      History Of Present Illness  Cristiane Bishop is a 80 year old /White female who presents to the [facility] today new patient referred from Rodger Jones MD.      New, Established, New Problem:  new  Location:  Left heel  Duration:  late March 2021  Onset:  gradual  Nature:  achy, sharp, shooting  Stable, worsening, improving:  worsening  Aggravating factors:    Patient describes morning Left heel pain as stabbing, burning, or aching. This pain usually subsides throughout the day, however it returns afterperiods of rest andsitting, when standing back up on their feet,and again the next morning.    Previous Treatment:  x-ray, change shoes    Patient denies any fevers, chills, nausea, vomiting, shortness of breathe, nor any other constitutional signs nor symptoms.       Past Medical History  Allergic rhinitis; Breast cancer; GERD; Heel pain; Heel spur; HTN (hypertension); Ingrown toenail; Mood disorder         Past Surgical History  Appendectomy; Breast biopsy, right breast; Hysterectomy-Abdominal; Mastectomy, Right         Medication List  Calcium 600 + D(3) 600 mg(1,500mg) -200 unit oral tablet; letrozole 2.5 mg oral tablet; lisinopril 10 mg oral tablet; loratadine 10 mg oral tablet; pantoprazole 40 mg oral tablet,delayed release (DR/EC); paroxetine HCl 10 mg oral tablet; Stool Softener 100 mg oral capsule         Allergy List  PENICILLINS; SULFA (SULFONAMIDES)       Allergies Reconciled  Family Medical History  Diabetes, unspecified  type; Family history of breast cancer; FH: heart disease; FH: lymphoma; FH: stroke         Social History  Alcohol (Never); Caffeine (Current some day); Second hand smoke exposure (Never); Tobacco (Never)         Review of Systems  · Constitutional  o Denies  o : fatigue, night sweats  · Eyes  o Denies  o : double vision, blurred vision  · HENT  o Denies  o : vertigo, recent head injury  · Cardiovascular  o Denies  o : chest pain, irregular heart beats  · Respiratory  o Denies  o : shortness of breath, productive cough  · Gastrointestinal  o Denies  o : nausea, vomiting  · Genitourinary  o Denies  o : dysuria, urinary retention  · Integument  o Denies  o : hair growth change, new skin lesions  · Neurologic  o Denies  o : altered mental status, seizures  · Musculoskeletal  o * See HPI  · Endocrine  o Denies  o : cold intolerance, heat intolerance  · Heme-Lymph  o Denies  o : petechiae, lymph node enlargement or tenderness  · Allergic-Immunologic  o Denies  o : frequent illnesses      Vitals  Date Time BP Position Site L\R Cuff Size HR RR TEMP (F) WT  HT  BMI kg/m2 BSA m2 O2 Sat FR L/min FiO2 HC       04/22/2021 01:51 /70 Sitting    78 - R  97.5 126lbs 16oz 5'   24.8 1.56 99 %      04/22/2021 01:51 /70 Sitting                       Physical Examination  · Constitutional  o Appearance  o : Awake, alert, well developed, well nourished and well groomed  · Cardiovascular  o Peripheral Vascular System  o :   § Pedal Pulses  § : Pedal Pulses are +2 and symmetrical   § Extremities  § : No edema of the lower extremities  · Skin and Subcutaneous Tissue  o General Inspection  o : Skin is noted to have normal texture and turgor, with no excresences noted.  o Digits and Nails  o : The tonails are noted to be without disease.  · Neurologic  o Sensation  o : Epicritic sensations intact bilaterally.  · Left Ankle/Foot  o Inspection  o : Positive tenderness to palpaiton to the medial tubercle of the calcaneus. No signs of  edema, erythema, lymphangitis, nor signs of infection.  · Injection Note/Aspiration Note  o Site  o : left heel   o Procedure  o : This patient presents for a trigger point injection. I have discussed the nature, risks, benefits, alternatives and limitations of this procedure with this patient and obtained informed consent. The area was sterilely prepped with isopropyl alcohol. A 27 gauge, 1.25 inch needle was inserted iinto the affected area. A sterile dressing was applied to the area.  o Medication  o : 0.5ml of 1% lidocaine plain, 0.5ml of 40mg/ml Kenalog, and 0.5ml of 4mg/ml Dexamethasone   o Disposition  o : The patient tolerated the procedure well     Dr. Jewell reviewed radiographs and results from [facility] and discussed them with the patient.  These are significant for Inferior calcaneal spurring seen.  No periosteal reactions nor osteolytic changes seen.  No occult fractures seen.           Assessment  · Foot pain, left     729.5/M79.672  · Plantar fascial fibromatosis of left foot     728.71/M72.2      Plan  · Orders  o Decadron 4 mg/1cc Injection () - 728.71/M72.2, 729.5/M79.672 - 04/22/2021   Injection - Dexamethasone 4mg/mL; Dose: 2 mg; Site: Not Entered; Route: subcutaneous; Date: 04/22/2021 14:13:07; Exp: 11/30/2022; Lot: [id number]; Mfg: [employer]; TradeName: dexamethasone sodium phosphate; Location[facility]; Administered By: Rush Jewell DPM; Comment: ndc 2105-5522-22 inj site left foot  o 2.00 - Kenalog Injection 20mg (-3) - 728.71/M72.2, 729.5/M79.672 - 04/22/2021   Injection - Kenalog 20 mg; Dose: 20mg; Site: Not Entered; Route: subcutaneous; Date: 04/22/2021 14:14:00; Exp: 11/30/2021; Lot: [id number]; Mfg: [employer].; TradeName: triamcinolone acetonide; Location: [facility]; Administered By: Rush Jewell DPM; Comment: ndc 5235-3251-56 inj site left foot  o Inj Tendon Sheath Or Ligament (79974) - 728.71/M72.2, 729.5/M79.672 -  04/22/2021  · Medications  o Medications have been Reconciled  o Transition of Care or Provider Policy  · Instructions  o Handouts provided regarding Planter Fascitis.  o Overview: This patient has a plantar fasciitis.  o Discuss Findings: I have discussed the findings of this evaluation with the patient. The discussion included a complete verbal explanation of any changes in the examination results, diagnosis, and the current treatment plan. A schedule for future care needs was explained. If any questions should arise after returning home, I have encouraged the patient to feel free to contact Dr. Jewell. The patient states understanding and agreement with this plan.  o Monitor For Problems: Pt to monitor for problems and to contact Dr. Jewell for follow-up should such signs occur. Patient states understanding and agreement with this plan.  o Spenco: Recommend OTC Spenco Orthotics.  o Treatement Alternatives: Nonsurgical treatments almost always improve the pain. Treatment can last from several months to 2 years before symptoms get better. Most patients feel better in 9 months. Rarely Some people need surgery to relieve the pain.  o Conservative Treatment Recommendations: Anti-inflammatory medication to reduce pain and inflammation, Heel stretching exercises,   o Follow Up in 2 weeks.  o Discussed proper shoegear for the patient's feet and medical condition.  o Rice Therapy: It is important to treat any injury as soon as possible to help control swelling and increase recovery time. The recognized regimen for immediate treatment of sport injuries includes rest, ice (cold application), compression, and elevation (RICE). Remove the injured athlete from play, apply ice to the affected area, wrap or compress the injured area with an elastic bandage when appropriate, and elevate the injured area above heart level to reduce swelling. The patient is to not use ice for longer than 20 minutes at a time, with at least 20  minutes of no ice usage between applications.   o Electronically Identified Patient Education Materials Provided Electronically  · Disposition  o Call or Return if symptoms worsen or persist.            Electronically Signed by: Rush Jewell DPM -Author on April 22, 2021 02:15:22 PM

## 2021-05-14 VITALS
DIASTOLIC BLOOD PRESSURE: 70 MMHG | SYSTOLIC BLOOD PRESSURE: 161 MMHG | HEIGHT: 60 IN | HEART RATE: 78 BPM | WEIGHT: 127 LBS | BODY MASS INDEX: 24.94 KG/M2 | TEMPERATURE: 97.5 F | OXYGEN SATURATION: 99 %

## 2021-05-28 VITALS
BODY MASS INDEX: 23.78 KG/M2 | HEIGHT: 60 IN | HEART RATE: 62 BPM | WEIGHT: 121.12 LBS | BODY MASS INDEX: 23.36 KG/M2 | HEIGHT: 60 IN | TEMPERATURE: 97.8 F | OXYGEN SATURATION: 98 % | RESPIRATION RATE: 12 BRPM | DIASTOLIC BLOOD PRESSURE: 47 MMHG | RESPIRATION RATE: 16 BRPM | OXYGEN SATURATION: 100 % | RESPIRATION RATE: 16 BRPM | HEART RATE: 69 BPM | BODY MASS INDEX: 24.17 KG/M2 | SYSTOLIC BLOOD PRESSURE: 131 MMHG | DIASTOLIC BLOOD PRESSURE: 55 MMHG | OXYGEN SATURATION: 100 % | TEMPERATURE: 98.1 F | HEIGHT: 60 IN | RESPIRATION RATE: 16 BRPM | TEMPERATURE: 97.1 F | DIASTOLIC BLOOD PRESSURE: 53 MMHG | TEMPERATURE: 98.1 F | SYSTOLIC BLOOD PRESSURE: 173 MMHG | HEART RATE: 84 BPM | HEIGHT: 60 IN | HEART RATE: 72 BPM | WEIGHT: 123.12 LBS | HEART RATE: 58 BPM | OXYGEN SATURATION: 100 % | RESPIRATION RATE: 16 BRPM | OXYGEN SATURATION: 100 % | TEMPERATURE: 97.8 F | HEIGHT: 60 IN | SYSTOLIC BLOOD PRESSURE: 139 MMHG | DIASTOLIC BLOOD PRESSURE: 65 MMHG | WEIGHT: 119 LBS | WEIGHT: 122.4 LBS | WEIGHT: 121.4 LBS | SYSTOLIC BLOOD PRESSURE: 117 MMHG | SYSTOLIC BLOOD PRESSURE: 187 MMHG | BODY MASS INDEX: 23.84 KG/M2 | DIASTOLIC BLOOD PRESSURE: 57 MMHG | BODY MASS INDEX: 24.03 KG/M2

## 2021-05-28 VITALS
SYSTOLIC BLOOD PRESSURE: 191 MMHG | HEART RATE: 67 BPM | DIASTOLIC BLOOD PRESSURE: 68 MMHG | BODY MASS INDEX: 25.94 KG/M2 | TEMPERATURE: 97.9 F | RESPIRATION RATE: 18 BRPM | WEIGHT: 124.12 LBS | OXYGEN SATURATION: 99 %

## 2021-05-28 NOTE — PROGRESS NOTES
Patient: HAMILTON SAUER     Acct: [id number]     Report: #[id number]  UNIT #: [id number]     : 1940    Encounter Date:2018  PRIMARY CARE: STEVE JIMENEZ  ***Signed***  --------------------------------------------------------------------------------------------------------------------  Progress Note      DATE: 18      Primary Care Provider:  RAVI SYKES      Referring Provider:  David Vale      Chief Complaint      Follow up Right Breast Cancer            Subjective      78-year-old white female has a history of right breast carcinoma status post     sentinel node biopsy and total mastectomy.  Patient has been started on Femara     in 2015.  She  is on prolia every 6 months.            Patient offers no complaints.  Patient  has a history of family members dying.            Past Med/Surg History            Past Med/Surg History:   No Hypertension             No Diabetes Mellitus             No Heart Disease             Blood Clots (post hysterectomy in )             Cancer (Breast Cancer R (mastectomy))            Social History      Social History:  No Tobacco Use, No Alcohol Use, No Recreational Drug use,     Other (son lives with her)            Allergies      Coded Allergies:             SUCCINYLCHOLINE (Verified  Allergy, Severe, FAMILY HISTORY OF     PSEUDOCHOLINESTERASE DEFICIENCY, 18)       PATIENT REQUSTS NOT TO RECIEVE DRUGS THAT CAN CAUSE PROBLEMS           PENICILLINS (Verified  Allergy, Unknown, SWELLING, 18)           SULFA (SULFONAMIDE ANTIBIOTICS) (Verified  Adverse Reaction, Unknown,     vomiting, 18)            Medications      Medications    Last Reconciled on 18 15:39 by GRACIELA YOUNGBLOOD MD      Letrozole (Letrozole) 2.5 Mg Tablet      2.5 MG PO QDAY for 90 Days, #90 TAB 3 Refills         Prov: Pretty Youngblood         18       Multivitamin/Iron/Folic Acid (Daily Multivitamin-Iron) 1 Tab Tablet      1 TAB PO QDAY for 30 Days, #30 TAB          Reported         2/14/18       Calcium Carb/Vit D3 (Calcium Carb 600 + D) 1 Each Tablet      1 EACH PO BID, #120 TAB 0 Refills         Reported         3/14/17       (All Day Allergy)   No Conflict Check      10 MG PO QDAY         Reported         3/14/17       Omeprazole (Omeprazole*) 20 Mg Capcr      20 MG PO HS, #30 CAP 0 Refills         Reported         5/27/15       PARoxetine HCl (Paxil*) 20 Mg Tablet      20 MG PO QDAY, TAB         Reported         3/8/11      Current Medications      Current Medications Reviewed 6/25/18            Pain Assessment      Pain Intensity:  0 (none)      Description:  None            Review of Systems      General:  No Anxiety, No Fatigue Scale:, No Pain Scale:, No Fever, No Other      HEENT:  No Dysphagia, No Hearing Changes, No Rhinorrhea, No Tinnitus, No Visual     Changes, No Nasal Congestion, No Epistaxis, No Other      Respiratory:  No Cough, No Shortness of Air, No Sputum Changes, No Wheezing, No     Hemoptysis, No Congestion, No Other      Cardiovascular:  No Chest Pain, No Pedal Edema, No Orthopnea, No Palpitations,     No Chest Pressure, No Dizziness, No Other      Gastrointestinal:  No Nausea, No Vomiting, No Dysphagia, No Constipation, No     Diarrhea, Appetite Good, No Appetite Fair, No Appetite Poor, No Early Satiety,     No Other      Genitourinary:  No Nocturia, No Dysuria, No Other      Musculoskeletal:  No Joint Effusions, No Joint Tenderness, No Joint Stiffness,     No Myalgias, No Aches, No Pains, No Other      Endocrine:  No Heat Intolerance, No Cold Intolerance, No Fatigue, No Blood     Sugar Control, No Other      Hematologic:  No Bleeding, No Bruising, No Swollen Glands, No Other      Allergic/Immunologic:  No Hives, No Throat closing off, No Nasal drip, No Itchy     eyes, No Hay fever, No Other      Psychological:  No Anxiety, No Depression, No Other      Neurological:  No Headaches, No Dizziness, No Weakness, No Numbness, No Other      Skin:  No Rash, No  Open Wounds, No Edema, No Other            Vitals      Height 5 ft 0 in / 152.4 cm      Weight 121 lbs 2 oz / 54.332555 kg      BSA 1.53 m2      BMI 23.7 kg/m2      Temperature 98.1 F / 36.72 C - Oral      Pulse 69      Respirations 16      Blood Pressure 131/55 Sitting, Left Arm      Pulse Oximetry 100%, room air            Exam      Constitutional:  No acute distress, Conversant, Pleasant      Eyes:  Anicteric sclerae, Palpebral Conjunctivae (pink), KIRSTEN      HENT:  Oropharynx clear, No Erythema, No Tonsils, Buccal mucosae (pink)      Neck:  Supple, Full Range of Motion, No Masses      Cardiovascular:  RRR, No Murmurs, Normal PMI, No Peripheral Edema      Lungs:  Clear to Ausculation, Normal Respiratory Effort      Abdomen:  NABS, No Masses, No Tenderness      Chest:  Other (symmetrical and equal)      Breast:  No Masses, No Tenderness, No Drainage, Other      Lymphatic:  No Neck, No Axillae      Extremities:  No digital cyanosis, No digital ischemia      Neurological:  Cranial Nerve II-XII, No Focal Sensory deficits      Psychological:  Appropriate affect, Intact judgement, Alert      Skin:  Normal temperature, Other (no dermatosis)            Lab Results      Laboratory Tests      4/30/18 08:54            5/2/18 11:49            Laboratory Tests            Test        4/30/18      08:54 5/2/18      11:49 5/4/18      21:57             White Blood Count        6.90 10*3/uL      (4.80-10.80)                      Red Blood Count        3.80 10*6/uL      (4.20-5.40)                      Hemoglobin        12.80 g/dL      (12.00-16.00)                      Hematocrit        37.5 %      (37.0-47.0)                      Mean Corpuscular Volume        98.5 fL      (81.0-99.0)                      Mean Corpuscular Hemoglobin        33.5 pg      (27.0-31.0)                      Mean Corpuscular Hemoglobin      Concent 34.1 %      (33.0-37.0)                      Red Cell Distribution Width        11.7 %      (11.5-14.5)                       Platelet Count        253.00 10*3/uL      (130..00)                      Mean Platelet Volume        7.7 fL      (7.4-10.4)                      Granulocytes (%)        52.3 %      (30.0-85.0)                      Lymphocytes (%) (Auto)        33.40 %      (20.00-45.00)                      Monocytes (%) (Auto)        8.80 %      (3.00-10.00)                      Eosinophils (%) (Auto)        5.00 %      (0.00-7.00)                      Basophils (%) (Auto)        0.47 %      (0.00-3.00)                      Granulocytes #        3.60 10*3/uL      (2.00-8.00)                      Lymphocytes # (Auto)        2.31 10*3/uL      (1.00-5.00)                      Monocytes # (Auto)        0.61 10*3/uL      (0.20-1.20)                      Eosinophils # (Auto)        0.35 10*3/uL      (0.00-0.70)                      Basophils # (Auto)        0.03 10*3/uL      (0.00-0.20)                      Nucleated Red Blood Cells %        0.00 %      (0.00-0.01)                      Urine Collection Type        Clean      CatchClea NA                      Urine Color YELLOW NA                Urine Appearance CLEAR NA                Urine pH        7.0 (pH)      (5.0-8.0)                      Urine Specific Gravity        1.017 NA      (1.005-1.030)                      Urine Protein        NEGATIVE mg/dL      (NEGATIVE)                      Urine Glucose (UA)        NEGATIVE mg/dL      (NEGATIVE)                      Urine Ketones        NEGATIVE mg/dL      (NEGATIVE)                      Urine Occult Blood        NEGATIVE NA      (NEGATIVE)                      Urine Nitrite        NEGATIVE NA      (NEGATIVE)                      Urine Bilirubin        NEGATIVE NA      (NEGATIVE)                      Urine Urobilinogen        0.2      {EhrlichU}/dL                      Urine Leukocyte Esterase        SMALL NA      (NEGATIVE)                      Urine RBC        NONE SEEN      /(HPF)                      Urine  WBC        OCC(2-5)      /(HPF)                      Urine Bacteria NEGATIVE NA                Urine Random Microalbumin        <12.0 mg/L      (0.0-20.0)                      Urine Creatinine        84.2 mg/dL      (10.0-300.0)                      Urine Microalbumin/Creatinine      Ratio 14.3 mg/g{Cre}      (0.0-35.0)                      Triglycerides Level        183 mg/dL      ()                      Cholesterol Level        159 mg/dL      (107-200)                      LDL Cholesterol        86 mg/dL      ()                      VLDL Cholesterol        37 mg/dL      (5-37)                      HDL Cholesterol        36 mg/dL      (40-60)                      Cholesterol/HDL Ratio        4.4 NA      (3.0-6.0)                      Sodium Level                138 mmol/L      (135-147)              Potassium Level                4.3 mmol/L      (3.5-5.3)              Chloride Level                100 mmol/L      ()              Carbon Dioxide Level                29 mmol/L      (22-32)              Anion Gap                13 mmol/L      (8-19)              Blood Urea Nitrogen                20 mg/dL      (5-25)              Creatinine                0.83 mg/dL      (0.50-0.90)              Glomerular Filtration Rate      Calc         >60      mL/min/{1.73_m2}              BUN/Creatinine Ratio                24 {ratio}      (6-20)              Glucose Level                117 mg/dL      (65-99)              Serum Osmolality  290 (273-304)               Calcium Level                9.5 mg/dL      (8.7-10.4)              Total Bilirubin                0.39 mg/dL      (0.20-1.30)              Aspartate Amino Transf      (AST/SGOT)         21 U/L (15-50)                      Alanine Aminotransferase      (ALT/SGPT)         10 U/L (10-40)                      Alkaline Phosphatase                78 U/L      ()              Total Protein                7.7 g/dL      (6.3-8.2)              Albumin                 4.0 g/dL      (3.5-5.0)              Globulin                3.7 g/dL      (2.0-3.5)              Albumin/Globulin Ratio                1.1 {ratio}      (1.4-2.6)              Lab Scanned Report                LAB FINAL      CUMULATIVES -            Radiology Impressions      Bone density test showed lumbar spine T score -0 point, femoral neck T score of     -1.7 indicating osteopenia 6            Impression/Problem List      Breast carcinoma status post right mastectomy on letrozole      New left breast mass above the nipple - mammogram showed no area of concern      Notes      New Diagnostics      * CA27-29, Stat       Dx: Breast cancer - C50.919      * CBC, Stat       Dx: Breast cancer - C50.919      * Comp Metabolic Panel, Stat       Dx: Breast cancer - C50.919            Plan      Continue letrozole, calcium with vitamin D, prolia   twice a year            Return to clinic in 4 months with CBC, CMP, CA-27-29, vitamin D level            Patient Education:        Breast Cancer in Women            PREVENTION      Hx Influenza Vaccination:  Yes      Date Influenza Vaccine Given:  Oct 16, 2017      Influenza Vaccine Declined:  No      2 or More Falls Past Year?:  No      Fall Past Year with Injury?:  No      Hx Pneumococcal Vaccination:  Yes (DATE UNKNOWN)      Encouraged to follow-up with:  PCP regarding preventative exams.      Chart initiated by      mAalia Allen MA                 Disclaimer: Converted document may not contain table formatting or lab diagrams. Please see [employer] for the authenticated document.

## 2021-05-28 NOTE — PROGRESS NOTES
Patient: HAMILTON SAUER     Acct: [id number]     Report: #[id number]  UNIT #: [id number]     : 1940    Encounter Date:2019  PRIMARY CARE: STEVE JIMENEZ  ***Signed***  --------------------------------------------------------------------------------------------------------------------  Progress Note      DATE: 9/3/19      Primary Care Provider:  RAVI SYKES      Referring Provider:  David Vale      Chief Complaint      FU (R) Breast Cancer            Subjective      79-year-old white female with history of right breast carcinoma status post     total mastectomy and sentinel lymph node dissection.  Patient has been on     letrozole 2.5 mg every day with no side effects.  Patient is also on Prolia     twice a year for osteoporosis            Patient had her mammogram this year which was supposed to be good.            Past Med/Surg History            Past Med/Surg History:   Hypertension             No Diabetes Mellitus             No Heart Disease             Blood Clots (post hysterectomy in )             Cancer (Breast Cancer R (mastectomy))             Other (Osteopenia)            Social History      Social History:  No Tobacco Use, No Alcohol Use, No Recreational Drug use; Other    (son lives with her)            Allergies      Coded Allergies:             SUCCINYLCHOLINE (Verified  Allergy, Severe, FAMILY HISTORY OF     PSEUDOCHOLINESTERASE DEFICIENCY, 18)                  PATIENT REQUSTS NOT TO RECIEVE DRUGS THAT CAN CAUSE PROBLEMS           PENICILLINS (Verified  Allergy, Unknown, SWELLING, 18)           SULFA (SULFONAMIDE ANTIBIOTICS) (Verified  Adverse Reaction, Unknown,     vomiting, 18)            Medications      Medications    Last Reconciled on 9/3/19 11:23 by GRACIELA YOUNGBLOOD MD      Letrozole (Letrozole) 2.5 Mg Tablet      2.5 MG PO QDAY for 90 Days, #90 TAB 3 Refills         Prov: Pretty Youngblood         19       Docusate Sodium (Colace) 100 Mg Capsule       100 MG PO HS, #30 CAP 0 Refills         Reported         6/25/18       Lisinopril* (Lisinopril*) 10 Mg Tablet      10 MG PO QDAY, #30 TAB 0 Refills         Reported         6/25/18       Calcium Carb/Vit D3 (CALCIUM 600-VIT D3 400 TABLET) 1 Each Tablet      1 EACH PO BID, #120 TAB 0 Refills         Reported         3/14/17       (All Day Allergy)   No Conflict Check      10 MG PO QDAY         Reported         3/14/17       Omeprazole (Omeprazole*) 20 Mg Capcr      20 MG PO HS, #30 CAP 0 Refills         Reported         5/27/15       PARoxetine HCl (Paxil*) 20 Mg Tablet      20 MG PO QDAY, TAB         Reported         3/8/11      Current Medications      Current Medications Reviewed 9/3/19            Pain Assessment      Pain Intensity:  0      Description:  None            Review of Systems      General:  No Anxiety; Fatigue Scale: (2), Pain Scale: (0)      HEENT:  No Dysphagia      Respiratory:  No Cough, No Shortness of Air      Cardiovascular:  No Chest Pain      Gastrointestinal:  No Nausea, No Vomiting; Appetite Good (Good)      Genitourinary:  No Nocturia      Musculoskeletal:  No Joint Effusions, No Joint Tenderness      Endocrine:  No Heat Intolerance      Hematologic:  No Bleeding      Allergic/Immunologic:  No Hives      Psychological:  No Anxiety, No Depression      Neurological:  No Headaches      Skin:  No Rash            Vitals      Height 5 ft 0 in / 152.4 cm      Weight 122 lbs 6.4 oz / 55.04866 kg      BSA 1.52 m2      BMI 23.9 kg/m2      Temperature 97.8 F / 36.56 C      Pulse 72      Respirations 12      Blood Pressure 139/65      Pulse Oximetry 100%            Exam      Constitutional:  No acute distress, Conversant, Pleasant      Eyes:  Anicteric sclerae, Palpebral Conjunctivae (Pink), IKRSTEN      HENT:  Oropharynx clear; No Erythema; Buccal mucosae (Pink)      Neck:  Supple, Full Range of Motion; No Masses      Cardiovascular:  RRR; No Murmurs; Normal PMI; No Peripheral Edema      Lungs:   Clear to Ausculation, Normal Respiratory Effort      Abdomen:  Soft, NABS; No Tenderness      Chest:  Other (Symmetrical and equal)      Breast:  No Masses, No Tenderness; Other (Left side)      Lymphatic:  No Neck, No Axillae      Extremities:  No digital cyanosis, No digital ischemia, Normal gait, Other (No     deformity)      Neurological:  Cranial Nerve II-XII (Intact); No Focal Sensory deficits      Psychological:  Appropriate affect, Appropriate mood, Intact judgement      Skin:  Other            Lab Results      Laboratory Tests      5/6/19 11:01            Laboratory Tests            Test       5/6/19      11:01 5/8/19      21:57             Creatinine       0.90 mg/dL      (0.50-0.90)                    Calcium Level       9.5 mg/dL      (8.7-10.4)                    Phosphorus Level       3.1 mg/dL      (2.4-4.5)                    Magnesium Level       1.99 mg/dL      (1.60-2.30)                    Lab Scanned Report              LAB FINAL      CUMULATIVES -            Impression/Problem List      Breast carcinoma status post right mastectomy on letrozole      Osteopenia      Hypertension      GERD for which she takes omeprazole      Notes      New Diagnostics      * Comp Metabolic Panel, Stat         Dx: Breast cancer, right breast - C50.911      * CBC With Auto Diff, Stat         Dx: Breast cancer, right breast - C50.911      * CA27-29, Stat         Dx: Breast cancer, right breast - C50.911            Plan      Continue letrozole, calcium with vitamin D, prolia   twice a year.      Patient may follow-up with her family care provider for  checkup.  Will come to     see me as needed.  Patient drives 1 hour to see me.      Draw CBC CMP CA-27-29 today            Patient Education:        Breast Cancer in Women            PREVENTION      Hx Influenza Vaccination:  Yes      Influenza Vaccine Declined:  No      2 or More Falls Past Year?:  No      Fall Past Year with Injury?:  No      Encouraged to follow-up  with:  PCP regarding preventative exams.      Chart initiated by      BINU Farrar MA                 Disclaimer: Converted document may not contain table formatting or lab diagrams. Please see [employer] for the authenticated document.

## 2021-05-28 NOTE — PROGRESS NOTES
Patient: HAMILTON SAUER     Acct: [id number]     Report: #[id number]  UNIT #: [id number]     : 1940    Encounter Date:2020  PRIMARY CARE: STEVE JIMENEZ  ***Signed***  --------------------------------------------------------------------------------------------------------------------  NURSE INTAKE      Visit Type      New Patient Visit            Chief Complaint      HISTORY OF BREAST CANCER            Referring Provider/Copies To      Primary Care Provider:  FER SAUER      Copies To:   FER SAUER            History and Present Illness      HPI - Oncology Interim      Hematology/oncology problem list:      -----------------------------------------------            1. History of right breast CA , S/P right mastectomy currently on Femara      2.  History of osteoporosis currently on Prolia            80-year-old white female has a history of right breast carcinoma status post     total mastectomy and sentinel lymph node dissection.  Patient had been on Femara    2.5 mg every day and Prolia for osteoporosis.  Patient offers no complaints.            2020: Mrs. Llanes return to the cancer blood [facility] today for follow-up     evaluation.  She has been doing quite well without complaint.  She is tolerating    Femara without major side effects.            PAST, FAMILY   Past Medical History      Past Medical History:  Hypertension      Hematology/Oncology (F):  Breast Cancer            Past Surgical History      Appendectomy, Hysterectomy, Mastectomy Right            Family History      Family History:  No Family History            Social History      Marital Status:        Lives independently:  Yes      Number of Children:  5            Tobacco Use      Currently Vaping:  No            Alcohol Use      Alcohol intake:  None            Substance Use      Substance use:  Denies use            REVIEW OF SYSTEMS      General:  Denies: Appetite Change, Fatigue, Fever, Night  Sweats, Weight Gain,     Weight Loss      Eye:  Denies Blurred Vision, Denies Corrective Lenses, Denies Diplopia, Denies     Vision Changes      ENT:  Denies Headache, Denies Hearing Loss, Denies Hoarseness, Denies Sore     Throat      Cardiovascular:  Denies Chest Pain, Denies Palpitations      Respiratory:  Denies: Cough, Coughing Blood, Productive Cough, Shortness of Air,    Wheezing      Gastrointestinal:  Denies Bloody Stools, Denies Constipation, Denies Diarrhea,     Denies Nausea/Vomiting, Denies Problem Swallowing, Denies Unable to Control     Bowels      Genitourinary:  Denies Blood in Urine, Denies Incontinence, Denies Painful     Urination      Musculoskeletal:  Denies Back Pain, Denies Muscle Pain, Denies Painful Joints      Integumentary:  Denies Itching, Denies Lesions, Denies Rash      Neurologic:  Denies Dizziness, Denies Numbness\Tingling, Denies Seizures      Psychiatric:  Denies Anxiety, Denies Depression      Endocrine:  Denies Cold Intolerance, Denies Heat Intolerance      Hematologic/Lymphatic:  Denies Bruising, Denies Bleeding, Denies Enlarged Lymph     Nodes      Reproductive:  Denies: Menopause, Heavy Periods, Pregnant, Still Menstruating            VITAL SIGNS AND SCORES      Vitals      Weight 124 lbs 1.904 oz / 56.3 kg      Temperature 97.9 F / 36.61 C - Temporal      Pulse 67      Respirations 18      Blood Pressure 191/68 Sitting, Left Arm      Pulse Oximetry 99%, ROOM AIR            Pain Score      Experiencing any pain?:  No      Pain Scale Used:  Numerical      Pain Intensity:  0            Fatigue Score      Experiencing any fatigue?:  No      Fatigue (0-10 scale):  0 (none)            EXAM      General Appearance:  Positive for: Alert, Oriented x3      HEENT:  Positive for: Oropharynx clear      Neck:  Positive for: Full ROM, Supple      Respiratory:  Positive for: CTAB      Breast - Left:  Negative for: Adenopathy, Appearance, Discharge, Mass, Skin Shannan    nges      Other      S/PE  right mastectomy no evidence of recurrence      Abdomen/Gastro:  Positive for: Normal Active Bowel Sounds, Soft      Cardiovascular:  Positive for: RRR      Lymphatic:  Negative for: Axillary, Cervical, Epitrochlear, Femoral,     Infraclavicular, Inguinal, Occipital, Popliteal, Posterior auricular, Pre    auricular, Supraclavicular            PREVENTION      Hx Influenza Vaccination:  Yes      Date Influenza Vaccine Given:  Oct 13, 2020      Influenza Vaccine Declined:  No      2 or More Falls in Past Year?:  No      Fall Past Year with Injury?:  No      Hx Pneumococcal Vaccination:  Yes      Encouraged to follow-up with:  PCP regarding preventative exams.      Chart initiated by      JACOB DELUCA [facility]            ALLERGY/MEDS      Allergies      Coded Allergies:             SUCCINYLCHOLINE (Verified  Allergy, Severe, FAMILY HISTORY OF     PSEUDOCHOLINESTERASE DEFICIENCY, 12/18/20)                  PATIENT REQUSTS NOT TO RECIEVE DRUGS THAT CAN CAUSE PROBLEMS           PENICILLINS (Verified  Allergy, Unknown, SWELLING, 12/18/20)           SULFA (SULFONAMIDE ANTIBIOTICS) (Verified  Adverse Reaction, Unknown,     vomiting, 12/18/20)            Medications      Last Reconciled on 6/16/20 14:05 by GRACIELA PAZ MD      Letrozole (Letrozole) 2.5 Mg Tablet      2.5 MG PO QDAY for 90 Days, #90 TAB 3 Refills         Prov: Pretty Paz         6/15/20       Docusate Sodium (Colace) 100 Mg Capsule      100 MG PO HS, #30 CAP 0 Refills         Reported         6/25/18       Lisinopril* (Lisinopril*) 10 Mg Tablet      10 MG PO QDAY, #30 TAB 0 Refills         Reported         6/25/18       Calcium Carb/Vit D3 (CALCIUM 600-VIT D3 400 TABLET) 1 Each Tablet      1 EACH PO QDAY, #120 TAB 0 Refills         Reported         3/14/17       (All Day Allergy)   No Conflict Check      10 MG PO QDAY         Reported         3/14/17       Omeprazole (Omeprazole*) 20 Mg Capcr      20 MG PO HS, #30 CAP 0 Refills         Reported         5/27/15        PARoxetine HCl (Paxil) 20 Mg Tablet      20 MG PO QDAY, TAB         Reported         3/8/11      Medications Reviewed:  No Changes made to meds            IMPRESSION/PLAN      Impression      1. History of right breast CA 2015, S/P right mastectomy currently on Femara      2.  History of osteoporosis currently on Prolia            Diagnosis      Breast CA - C50.919            Notes      New Diagnostics      * CBC With Auto Diff, Routine         Dx: Breast CA - C50.919      * CMP Comp Metabolic Panel, Routine         Dx: Breast CA - C50.919            Plan      1.  We will continue to monitor      2.  RTC 6 months CBC and CMP      3.  We will continue with Prolia            Patient Education      Patient Education Provided:  Yes            Electronically signed by Behairy,Ahmed S  12/18/2020 10:16       Disclaimer: Converted document may not contain table formatting or lab diagrams. Please see [employer] for the authenticated document.

## 2021-05-28 NOTE — PROGRESS NOTES
Patient: HAMILTON SAUER     Acct: [id number]     Report: #[id number]  UNIT #: [id number]     : 1940    Encounter Date:2019  PRIMARY CARE: STEVE JIMENEZ  ***Signed***  --------------------------------------------------------------------------------------------------------------------  Progress Note      DATE: 3/4/19      Primary Care Provider:  RAVI SYKES      Referring Provider:  David Vale      Chief Complaint      Right breast Ca. Follow-up            Subjective      78-year-old white female has a history of right breast carcinoma status post     total mastectomy and sentinel lymph node dissection.  Patient had been on Femara    2.5 mg every day and Prolia for osteoporosis.  Patient offers no complaints.            Past Med/Surg History            Past Med/Surg History:   No Hypertension             No Diabetes Mellitus             No Heart Disease             Blood Clots (post hysterectomy in )             Cancer (Breast Cancer R (mastectomy))            Social History      Social History:  No Tobacco Use, No Alcohol Use, No Recreational Drug use; Other    (son lives with her)            Allergies      Coded Allergies:             SUCCINYLCHOLINE (Verified  Allergy, Severe, FAMILY HISTORY OF     PSEUDOCHOLINESTERASE DEFICIENCY, 18)                  PATIENT REQUSTS NOT TO RECIEVE DRUGS THAT CAN CAUSE PROBLEMS           PENICILLINS (Verified  Allergy, Unknown, SWELLING, 18)           SULFA (SULFONAMIDE ANTIBIOTICS) (Verified  Adverse Reaction, Unknown,     vomiting, 18)            Medications      Medications    Last Reconciled on 3/5/19 19:05 by GRACIELA YOUNGBLOOD MD      Letrozole (Letrozole) 2.5 Mg Tablet      2.5 MG PO QDAY for 90 Days, #90 TAB 3 Refills         Prov: Pretty Youngblood         10/29/18       Docusate Sodium (Colace) 100 Mg Capsule      100 MG PO HS, #30 CAP 0 Refills         Reported         18       Lisinopril* (Lisinopril*) 10 Mg Tablet      10 MG PO  QDAY, #30 TAB 0 Refills         Reported         6/25/18       Calcium Carb/Vit D3 (CALCIUM 600-VIT D3 400 TABLET) 1 Each Tablet      1 EACH PO BID, #120 TAB 0 Refills         Reported         3/14/17       (All Day Allergy)   No Conflict Check      10 MG PO QDAY         Reported         3/14/17       Omeprazole (Omeprazole*) 20 Mg Capcr      20 MG PO HS, #30 CAP 0 Refills         Reported         5/27/15       PARoxetine HCl (Paxil*) 20 Mg Tablet      20 MG PO QDAY, TAB         Reported         3/8/11      Current Medications      Current Medications Reviewed 3/4/19            Pain Assessment      Pain Intensity:  0      Description:  None            Review of Systems      General:  No Fatigue Scale:, No Pain Scale:      HEENT:  No Dysphagia, No Hearing Changes, No Visual Changes      Respiratory:  No Cough, No Shortness of Air      Cardiovascular:  No Chest Pain, No Palpitations      Gastrointestinal:  No Nausea, No Vomiting, No Constipation, No Diarrhea;     Appetite Good      Genitourinary:  No Nocturia, No Dysuria      Musculoskeletal:  No Aches, No Pains      Endocrine:  No Heat Intolerance, No Fatigue      Hematologic:  No Bleeding, No Bruising      Allergic/Immunologic:  No Hives, No Nasal drip      Psychological:  No Anxiety, No Depression      Neurological:  No Headaches, No Dizziness      Skin:  No Rash, No Edema            Vitals      Height 5 ft 0 in / 152.4 cm      Weight 123 lbs 2 oz / 55.377886 kg      BSA 1.52 m2      BMI 24.0 kg/m2      Temperature 97.8 F / 36.56 C      Pulse 62      Respirations 16      Blood Pressure 173/57      Pulse Oximetry 100%            Exam      Constitutional:  No acute distress, Conversant, Pleasant; No Weakness      Eyes:  Anicteric sclerae, Palpebral Conjunctivae (Pink), KIRSTEN      HENT:  Oropharynx clear; No Erythema; Buccal mucosae (Pink)      Neck:  Supple, Full Range of Motion; No Masses      Cardiovascular:  RRR; No Murmurs; Normal PMI; No Peripheral Edema       Lungs:  Clear to Ausculation, Normal Respiratory Effort; No Rhonchi      Abdomen:  Soft, NABS; No Masses, No Tenderness      Chest:  Other (Symmetrical and equal)      Breast:  No Masses, No Tenderness, No Drainage; Other (Left side, right     mastectomy with no nodule)      Lymphatic:  No Neck, No Axillae      Extremities:  No digital cyanosis, No digital ischemia, Normal gait, Other (No     deformity)      Neurological:  Cranial Nerve II-XII (Intact); No Focal Sensory deficits      Psychological:  Appropriate affect, Appropriate mood, Intact judgement      Skin:  Normal temperature, Other (No dermatosis)            Lab Results      Laboratory Tests      3/4/19 09:50            Laboratory Tests            Test       3/4/19      09:50             White Blood Count       6.12 10*3/uL      (4.80-10.80)             Red Blood Count       3.91 10*6/uL      (4.20-5.40)             Hemoglobin       12.20 g/dL      (12.00-16.00)             Hematocrit       38.0 %      (37.0-47.0)             Mean Corpuscular Volume       97.2 fL      (81.0-99.0)             Mean Corpuscular Hemoglobin       31.2 pg      (27.0-31.0)             Mean Corpuscular Hemoglobin      Concent 32.1 %      (33.0-37.0)             Red Cell Distribution Width       13.5 %      (11.7-14.4)             RDW Standard Deviation       48.5 fL      (36.4-46.3)             Platelet Count       251.00 10*3/uL      (130..00)             Mean Platelet Volume       9.8 fL      (9.4-12.3)             Granulocytes (%)       55.7 %      (30.0-85.0)             Lymphocytes (%) (Auto)       30.40 %      (20.00-45.00)             Monocytes (%) (Auto)       10.60 %      (3.00-10.00)             Eosinophils (%) (Auto)       2.80 %      (0.00-7.00)             Basophils (%) (Auto)       0.30 %      (0.00-3.00)             Granulocytes #       3.41 10*3/uL      (2.00-8.00)             Lymphocytes # (Auto)       1.86 10*3/uL      (1.00-5.00)             Monocytes #  (Auto)       0.65 10*3/uL      (0.20-1.20)             Eosinophils # (Auto)       0.17 10*3/uL      (0.00-0.70)             Basophils # (Auto)       0.02 10*3/uL      (0.00-0.20)             Immature Granulocytes %       0.2 %      (0.0-1.8)             Nucleated Red Blood Cells %       0.00 %      (0.00-0.70)             Immature Granulocytes #       0.01 10*3/uL      (0.00-0.20)             Sodium Level       137 mmol/L      (135-147)             Potassium Level       4.4 mmol/L      (3.5-5.3)             Chloride Level       99 mmol/L      ()             Carbon Dioxide Level       29 mmol/L      (22-32)             Anion Gap       13 mmol/L      (8-19)             Blood Urea Nitrogen       13 mg/dL      (5-25)             Creatinine       0.78 mg/dL      (0.50-0.90)             Glomerular Filtration Rate      Calc >60      mL/min/{1.73_m2}             BUN/Creatinine Ratio       17 {ratio}      (6-20)             Glucose Level       87 mg/dL      (65-99)             Serum Osmolality 283 (273-304)              Calcium Level       8.9 mg/dL      (8.7-10.4)             Total Bilirubin       0.41 mg/dL      (0.20-1.30)             Aspartate Amino Transf      (AST/SGOT) 19 U/L (15-50)                    Alanine Aminotransferase      (ALT/SGPT) 10 U/L (10-40)                    Alkaline Phosphatase       72 U/L      ()             Total Protein       7.7 g/dL      (6.3-8.2)             Albumin       4.2 g/dL      (3.5-5.0)             Globulin       3.5 g/dL      (2.0-3.5)             Albumin/Globulin Ratio       1.2 {ratio}      (1.4-2.6)             CA 27.29       17.6 U/mL      (0.0-38.6)            Impression/Problem List      Breast carcinoma status post right mastectomy on letrozole      New left breast mass above the nipple - mammogram showed no area of concern      Notes      New Diagnostics      * CBC, Stat         Dx: Breast CA - C50.919      * Comp Metabolic Panel, Stat         Dx: Breast CA -  C50.919      * CA27-29, Stat         Dx: Breast CA - C50.919            Plan      Continue letrozole, calcium with vitamin D, prolia   twice a year            Return to clinic in 6 months with CBC, CMP, CA-27-29.      Draw CBC CMP CA-27-29 today            Patient Education:        Exercise for Older Adults: Don't Forget to Stretch!      [employer]: Helping Older Adults Maintain Their [place]            PREVENTION      Hx Influenza Vaccination:  Yes      Influenza Vaccine Declined:  No      2 or More Falls Past Year?:  No      Fall Past Year with Injury?:  No      Encouraged to follow-up with:  PCP regarding preventative exams.      Chart initiated by      ANDREW Prater RN                 Disclaimer: Converted document may not contain table formatting or lab diagrams. Please see [employer] for the authenticated document.

## 2021-05-28 NOTE — PROGRESS NOTES
Patient: HAMILTON SAUER     Acct: [id number]     Report: #[id number]  UNIT #: [id number]     : 1940    Encounter Date:06/15/2020  PRIMARY CARE: STEVE JIMENEZ  ***Signed***  --------------------------------------------------------------------------------------------------------------------  Progress Note      DATE: 6/15/20      Primary Care Provider:  FER SAUER      Referring Provider:  David Vale      Chief Complaint      FU (R) Breast Cancer            Subjective      80-year-old white female with a history of right breast carcinoma on Femara 2.5     mg/day.  Patient denies any symptoms from the Femara.            Patient is on Prolia twice a year for osteoporosis.            Past Med/Surg History            Past Med/Surg History:   Hypertension             No Diabetes Mellitus             No Heart Disease             Blood Clots (post hysterectomy in )             Cancer (Breast Cancer R (mastectomy))             Other (Osteopenia)            Social History      Social History:  No Tobacco Use, No Alcohol Use, No Recreational Drug use; Other    (son lives with her)            Allergies      Coded Allergies:             SUCCINYLCHOLINE (Verified  Allergy, Severe, FAMILY HISTORY OF     PSEUDOCHOLINESTERASE DEFICIENCY, 18)                  PATIENT REQUSTS NOT TO RECIEVE DRUGS THAT CAN CAUSE PROBLEMS           PENICILLINS (Verified  Allergy, Unknown, SWELLING, 18)           SULFA (SULFONAMIDE ANTIBIOTICS) (Verified  Adverse Reaction, Unknown,     vomiting, 18)            Medications      Medications    Last Reconciled on 20 14:05 by GRACIELA YOUNGBLOOD MD      Letrozole (Letrozole) 2.5 Mg Tablet      2.5 MG PO QDAY for 90 Days, #90 TAB 3 Refills         Prov: Pretty Youngblood         6/15/20       Docusate Sodium (Colace) 100 Mg Capsule      100 MG PO HS, #30 CAP 0 Refills         Reported         18       Lisinopril* (Lisinopril*) 10 Mg Tablet      10 MG PO QDAY, #30 TAB 0  Refills         Reported         6/25/18       Calcium Carb/Vit D3 (CALCIUM 600-VIT D3 400 TABLET) 1 Each Tablet      1 EACH PO QDAY, #120 TAB 0 Refills         Reported         3/14/17       (All Day Allergy)   No Conflict Check      10 MG PO QDAY         Reported         3/14/17       Omeprazole (Omeprazole*) 20 Mg Capcr      20 MG PO HS, #30 CAP 0 Refills         Reported         5/27/15       PARoxetine HCl (Paxil) 20 Mg Tablet      20 MG PO QDAY, TAB         Reported         3/8/11      Current Medications      Current Medications Reviewed 6/15/20            Pain Assessment      Pain Intensity:  0      Description:  None            Review of Systems      General:  No Anxiety; Fatigue Scale: (0), Pain Scale: (0)      HEENT:  No Dysphagia, No Hearing Changes      Respiratory:  No Cough      Cardiovascular:  No Chest Pain, No Pedal Edema      Gastrointestinal:  No Nausea; Appetite Good (Good)      Genitourinary:  No Nocturia      Musculoskeletal:  No Joint Effusions, No Joint Tenderness      Endocrine:  No Heat Intolerance      Hematologic:  No Bleeding      Allergic/Immunologic:  No Hives      Psychological:  No Anxiety      Neurological:  No Headaches, No Dizziness      Skin:  No Rash            Vitals      Height 5 ft 0 in / 152.4 cm      Weight 121 lbs 6.4 oz / 55.471265 kg      BSA 1.51 m2      BMI 23.7 kg/m2      Temperature 97.1 F / 36.17 C      Pulse 58      Respirations 16      Blood Pressure 187/53      Pulse Oximetry 98%            Exam      Constitutional:  No acute distress, Conversant, Pleasant      Eyes:  Anicteric sclerae, Palpebral Conjunctivae (Pink), KIRSTEN      Neck:  Supple      Cardiovascular:  RRR; No Murmurs; Normal PMI; No Peripheral Edema      Lungs:  Clear to Ausculation, Normal Respiratory Effort      Abdomen:  Soft; No Masses, No Tenderness      Chest:  Other (Symmetrical)      Lymphatic:  No Neck, No Axillae      Extremities:  No digital cyanosis, No digital ischemia, Normal gait,  Other (No     deformity)      Neurological:  Cranial Nerve II-XII (Intact); No Focal Sensory deficits      Psychological:  Appropriate affect, Appropriate mood, Intact judgement, Alert      Skin:  Other (No dermatosis)            Lab Results      Laboratory Tests      5/8/20 10:59            Laboratory Tests            Test       5/8/20      10:59 5/10/20      21:56             White Blood Count       7.31 10*3/uL      (4.80-10.80)                    Red Blood Count       3.88 10*6/uL      (4.20-5.40)                    Hemoglobin       11.9 g/dL      (12.0-16.0)                    Hematocrit       37.4 %      (37.0-47.0)                    Mean Corpuscular Volume       96.4 fL      (81.0-99.0)                    Mean Corpuscular Hemoglobin       30.7 pg      (27.0-31.0)                    Mean Corpuscular Hemoglobin      Concent 31.8      (33.0-37.0)                    Red Cell Distribution Width       13.5 %      (11.7-14.4)                    RDW Standard Deviation       48.1 fL      (36.4-46.3)                    Platelet Count       276 10*3/uL      (130-400)                    Mean Platelet Volume       9.7 fL      (9.4-12.3)                    Granulocytes (%)       56.0 %      (30.0-85.0)                    Granulocytes #       4.09 10*3/uL      (2.00-8.00)                    Lymphocytes # (Auto)       2.29 10*3/uL      (1.00-5.00)                    Monocytes # (Auto)       0.66 10*3/uL      (0.20-1.20)                    Eosinophils # (Auto)       0.22 10*3/uL      (0.00-0.70)                    Basophils # (Auto)       0.03 10*3/uL      (0.00-0.20)                    Immature Granulocytes %       0.3 %      (0.0-1.8)                    Lymphocytes %       31.3 %      (20.0-45.0)                    Monocytes %       9.0 %      (3.0-10.0)                    Eosinophils %       3.0 %      (0.0-7.0)                    Basophils %       0.4 %      (0.0-3.0)                    Nucleated Red Blood Cells %        0.00 %      (0.00-0.70)                    Immature Granulocytes #       0.02 10*3/uL      (0.00-0.20)                    Sodium Level       135 mmol/L      (135-147)                    Potassium Level       4.2 mmol/L      (3.5-5.3)                    Chloride Level       95 mmol/L      ()                    Carbon Dioxide Level       28 mmol/L      (22-32)                    Anion Gap       16 mmol/L      (8-19)                    Blood Urea Nitrogen       15 mg/dL      (5-25)                    Creatinine       0.81 mg/dL      (0.50-0.90)                    Glomerular Filtration Rate      Calc >60      mL/min/{1.73_m2}                    BUN/Creatinine Ratio       19 {ratio}      (6-20)                    Glucose Level       98 mg/dL      (65-99)                    Serum Osmolality 281 (273-304)               Calcium Level       9.7 mg/dL      (8.7-10.4)                    Phosphorus Level       3.6 mg/dL      (2.4-4.5)                    Magnesium Level       2.03 mg/dL      (1.60-2.30)                    Total Bilirubin       0.56 mg/dL      (0.20-1.30)                    Aspartate Amino Transf      (AST/SGOT) 18 U/L (15-50)                           Alanine Aminotransferase      (ALT/SGPT) 7 U/L (10-40)                           Alkaline Phosphatase       83 U/L      ()                    Total Protein       7.9 g/dL      (6.3-8.2)                    Albumin       4.1 g/dL      (3.5-5.0)                    Globulin       3.8 g/dL      (2.0-3.5)                    Albumin/Globulin Ratio       1.1 {ratio}      (1.4-2.6)                    Lab Scanned Report              LAB FINAL      CUMULATIVES -            Impression/Problem List      Breast carcinoma status post right mastectomy on letrozole      Osteopenia      Hypertension      GERD for which she takes omeprazole      Carcinoma of right breast in female, estrogen receptor positive - C50.911, Z17.0            Hypertension - I10             Osteoporosis - M81.0            Notes      Renewed Medications      * Letrozole 2.5 MG TABLET: 2.5 MG PO QDAY 90 Days #90         Instructions: 1 TAB DAILY      Changed Medications      * Calcium Carb/Vit D3 (CALCIUM 600-VIT D3 400 TABLET) 1 EACH TABLET: 1 EACH PO       QDAY #120      Problems:        (1) Osteoporosis      (2) Hypertension      (3) Carcinoma of right breast in female, estrogen receptor positive            Plan      Continue letrozole.  Return to clinic in 1 year with CBC, CMP, CA-27-29.      Schedule for left mammogram and bone density test.            Patient Education:        Breast Cancer in Women            PREVENTION      Hx Influenza Vaccination:  Yes      Influenza Vaccine Declined:  No      2 or More Falls Past Year?:  No      Fall Past Year with Injury?:  No      Encouraged to follow-up with:  PCP regarding preventative exams.      Chart initiated by      BINU Solis MA            Electronically signed by Pretty Paz  06/16/2020 14:06       Disclaimer: Converted document may not contain table formatting or lab diagrams. Please see [employer] for the authenticated document.

## 2021-05-28 NOTE — PROGRESS NOTES
Patient: HAMILTON SAUER     Acct: [id number]     Report: #[id number]  UNIT #: [id number]     : 1940    Encounter Date:2018  PRIMARY CARE: STEVE JIMENEZ  ***Signed***  --------------------------------------------------------------------------------------------------------------------  Progress Note      DATE: 18      Referring Physician      Idania Rivera      Chief Complaint      Follow up Right Breast Cancer            Subjective      77-year-old white female has history of right breast carcinoma that is post     total mastectomy.  She has been on Femara without side effects.            No complaints noted.            Past Med/Surg History            Past Med/Surg History:   No Hypertension             No Diabetes Mellitus             No Heart Disease             Blood Clots (post hysterectomy in )             Cancer (Breast Cancer R (mastectomy))            Social History      Social History:  No Tobacco Use, No Alcohol Use, No Recreational Drug use,     Other (son lives with her)            Allergies      Coded Allergies:             SUCCINYLCHOLINE (Verified  Allergy, Severe, FAMILY HISTORY OF     PSEUDOCHOLINESTERASE DEFICIENCY, 18)       PATIENT REQUSTS NOT TO RECIEVE DRUGS THAT CAN CAUSE PROBLEMS           PENICILLINS (Verified  Allergy, Unknown, SWELLING, 18)           SULFA (SULFONAMIDE ANTIBIOTICS) (Verified  Adverse Reaction, Unknown,     vomiting, 18)            Medications      Medications    Last Reconciled on 18 11:23 by GRACIELA YOUNGBLOOD MD      Letrozole (Letrozole) 2.5 Mg Tablet      2.5 MG PO QDAY for 90 Days, #90 TAB 3 Refills         Prov: Pretty Youngblood         18       Multivitamin/Iron/Folic Acid (Daily Multivitamin-Iron) 1 Tab Tablet      1 TAB PO QDAY for 30 Days, #30 TAB         Reported         18       Calcium Carb/Vit D3 (Calcium Carb 600 + D) 1 Each Tablet      1 EACH PO BID, #120 TAB 0 Refills         Reported         3/14/17        (All Day Allergy)   No Conflict Check      10 MG PO QDAY         Reported         3/14/17       Omeprazole (Omeprazole*) 20 Mg Capcr      20 MG PO HS, #30 CAP 0 Refills         Reported         5/27/15       PARoxetine HCl (Paxil*) 20 Mg Tablet      20 MG PO QDAY, TAB         Reported         3/8/11      Current Medications      Current Medications Reviewed 2/14/18            Pain Assessment      Pain Intensity:  0 (none)      Description:  None            Review of Systems      General:  No Anxiety, Fatigue Scale: (2), No Pain Scale:, No Fever, No Other      HEENT:  No Dysphagia, No Hearing Changes, No Rhinorrhea, No Tinnitus, No Visual     Changes, No Nasal Congestion, No Epistaxis, No Other      Respiratory:  No Cough, No Shortness of Air, No Sputum Changes, No Wheezing, No     Hemoptysis, No Congestion, No Other      Cardiovascular:  No Chest Pain, No Pedal Edema, No Orthopnea, No Palpitations,     No Chest Pressure, No Dizziness, No Other      Gastrointestinal:  Nausea (not since Sunday), Vomiting, No Dysphagia, No     Constipation, Diarrhea, No Appetite Good, Appetite Fair, No Appetite Poor, No     Early Satiety, No Other      Genitourinary:  No Nocturia, No Dysuria, No Other      Musculoskeletal:  No Joint Effusions, Joint Tenderness, Joint Stiffness, No     Myalgias, Aches, Pains, No Other      Endocrine:  No Heat Intolerance, No Cold Intolerance, No Fatigue, No Blood     Sugar Control, No Other      Hematologic:  No Bleeding, No Bruising, No Swollen Glands, No Other      Allergic/Immunologic:  No Hives, No Throat closing off, Nasal drip, No Itchy     eyes, No Hay fever, No Other      Psychological:  No Anxiety, No Depression, No Other      Neurological:  No Headaches, No Dizziness, No Weakness, No Numbness, No Other      Skin:  No Rash, No Open Wounds, No Edema, No Other            Vitals      Height 5 ft 0 in / 152.4 cm      Weight 119 lbs 0 oz / 53.689879 kg      BSA 1.52 m2      BMI 23.2 kg/m2       Temperature 98.1 F / 36.72 C - Oral      Pulse 84      Respirations 16      Blood Pressure 117/47 Sitting, Left Arm      Pulse Oximetry 100%, room air            Exam      Constitutional:  No acute distress, Conversant, Pleasant      Eyes:  Anicteric sclerae, Palpebral Conjunctivae (pink), KIRSTEN, Other (glasses)      HENT:  Oropharynx clear, No Erythema, Buccal mucosae      Neck:  Supple, Full Range of Motion      Cardiovascular:  RRR, No Murmurs, Normal PMI, No Peripheral Edema      Lungs:  Clear to Ausculation, Normal Respiratory Effort      Abdomen:  Soft, NABS, No Tenderness      Chest:  Other (symmetrical and equal)      Breast:  No Masses, No Tenderness, No Drainage, Other (left side)      Lymphatic:  No Neck      Extremities:  No digital cyanosis, Normal gait, Other (no deformity, no     limitation range of motion)      Neurological:  Cranial Nerve II-XII, No Focal Sensory deficits      Psychological:  Appropriate affect, Intact judgement, Alert      Skin:  Normal temperature, Other (no dermatosis)            Impression/Problem List      Breast carcinoma status post right mastectomy on letrozole      New left breast mass above the nipple - mammogram showed no area of concern      Notes      New Medications      * Multivitamin/Iron/Folic Acid (Daily Multivitamin-Iron) 1 TAB TABLET: 1 TAB PO     QDAY 30 Days #30      Renewed Medications      * Letrozole 2.5 MG TABLET:       From: 2.5 MG PO QDAY       To: 2.5 MG PO QDAY 90 Days #90       Instructions: 1 TAB      New Diagnostics      * CA27-29, Stat       Dx: Breast cancer - C50.919      * CBC, Stat       Dx: Breast cancer - C50.919      * Comp Metabolic Panel, Stat       Dx: Breast cancer - C50.919            Plan      Refilled letrozole      Bone density test in May      Return to clinic in 4 months with CBC, CMP, CA-27-29            Patient Education:        Breast Cancer in Women            Hx Influenza Vaccination:  Yes      Date Influenza Vaccine Given:   Oct 16, 2017      Influenza Vaccine Declined:  No      2 or More Falls Past Year?:  No      Fall Past Year with Injury?:  No      Hx Pneumococcal Vaccination:  Yes (DATE UNKNOWN)      Encouraged to follow-up with:  PCP regarding preventative exams.      Chart initiated by      Amalia Allen MA                 Disclaimer: Converted document may not contain table formatting or lab diagrams. Please see [email or web address] for the authenticated document.

## 2021-06-05 NOTE — PROGRESS NOTES
Progress Note      Patient Name: Cristiane Bishop   Patient ID: [id number]   Sex: Female   YOB: 1940    Primary Care Provider: Rodger Jones MD   Referring Provider: Rodger Jones MD    Visit Date: May 10, 2021    Provider: Rush Jewell DPM   Location: [facility]   Location Address: [address] [place] [address]  [id number]   Location Phone: [phone]          Chief Complaint  · Left Foot Pain      History Of Present Illness  Cristiane Bishop is a 80 year old /White female who presents to the [facility] today a follow up for:      New, Established, New Problem:  est  Location:  Left heel  Duration:  late March 2021  Onset:  gradual  Nature:  achy, sharp, shooting  Stable, worsening, improving:  modest improvement  Aggravating factors:    Patient describes morning Left heel pain as stabbing, burning, or aching. This pain usually subsides throughout the day, however it returns afterperiods of rest andsitting, when standing back up on their feet,and again the next morning.    Previous Treatment:  x-ray, change shoes, cortisone injection    Patient denies any fevers, chills, nausea, vomiting, shortness of breathe, nor any other constitutional signs nor symptoms.       Past Medical History  Allergic rhinitis; Breast cancer; GERD; Heel pain; Heel spur; HTN (hypertension); Ingrown toenail; Mood disorder         Past Surgical History  Appendectomy; Breast biopsy, right breast; Hysterectomy-Abdominal; Mastectomy, Right         Medication List  Calcium 600 + D(3) 600 mg(1,500mg) -200 unit oral tablet; letrozole 2.5 mg oral tablet; lisinopril 10 mg oral tablet; loratadine 10 mg oral tablet; pantoprazole 40 mg oral tablet,delayed release (DR/EC); paroxetine HCl 10 mg oral tablet; Stool Softener 100 mg oral capsule         Allergy List  PENICILLINS; SULFA (SULFONAMIDES)       Allergies Reconciled  Family Medical History  Diabetes, unspecified type;  Family history of breast cancer; FH: heart disease; FH: lymphoma; FH: stroke         Social History  Alcohol (Never); Caffeine (Current some day); Second hand smoke exposure (Never); Tobacco (Never)         Review of Systems  · Constitutional  o Denies  o : fatigue, night sweats  · Eyes  o Denies  o : double vision, blurred vision  · HENT  o Denies  o : vertigo, recent head injury  · Cardiovascular  o Denies  o : chest pain, irregular heart beats  · Respiratory  o Denies  o : shortness of breath, productive cough  · Gastrointestinal  o Denies  o : nausea, vomiting  · Genitourinary  o Denies  o : dysuria, urinary retention  · Integument  o Denies  o : hair growth change, new skin lesions  · Neurologic  o Denies  o : altered mental status, seizures  · Musculoskeletal  o * See HPI  · Endocrine  o Denies  o : cold intolerance, heat intolerance  · Heme-Lymph  o Denies  o : petechiae, lymph node enlargement or tenderness  · Allergic-Immunologic  o Denies  o : frequent illnesses      Vitals  Date Time BP Position Site L\R Cuff Size HR RR TEMP (F) WT  HT  BMI kg/m2 BSA m2 O2 Sat FR L/min FiO2 HC       05/10/2021 02:36 /83 Sitting    74 - R  97.5 125lbs 16oz 5'   24.61 1.56 100 %      05/10/2021 02:36 /67 Sitting                       Physical Examination  · Constitutional  o Appearance  o : Awake, alert, well developed, well nourished and well groomed  · Cardiovascular  o Peripheral Vascular System  o :   § Pedal Pulses  § : Pedal Pulses are +2 and symmetrical   § Extremities  § : No edema of the lower extremities  · Skin and Subcutaneous Tissue  o General Inspection  o : Skin is noted to have normal texture and turgor, with no excresences noted.  o Digits and Nails  o : The tonails are noted to be without disease.  · Neurologic  o Sensation  o : Epicritic sensations intact bilaterally.  · Left Ankle/Foot  o Inspection  o : Positive tenderness to palpaiton to the medial tubercle of the calcaneus. No signs of  edema, erythema, lymphangitis, nor signs of infection.  · Injection Note/Aspiration Note  o Site  o : left heel   o Procedure  o : This patient presents for a trigger point injection. I have discussed the nature, risks, benefits, alternatives and limitations of this procedure with this patient and obtained informed consent. The area was sterilely prepped with isopropyl alcohol. A 27 gauge, 1.25 inch needle was inserted iinto the affected area. A sterile dressing was applied to the area.  o Medication  o : 0.5ml of 1% lidocaine plain, 0.5ml of 40mg/ml Kenalog, and 0.5ml of 4mg/ml Dexamethasone   o Disposition  o : The patient tolerated the procedure well          Assessment  · Foot pain, left     729.5/M79.672  · Plantar fascial fibromatosis of left foot     728.71/M72.2      Plan  · Orders  o Decadron 4 mg/1cc Injection () - 728.71/M72.2, 729.5/M79.672 - 05/10/2021   Injection - Dexamethasone 4mg/mL; Dose: 2 mg; Site: Not Entered; Route: subcutaneous; Date: 05/10/2021 15:27:26; Exp: 11/30/2022; Lot: [id number]; Mfg: [employer]; TradeName: dexamethasone sodium phosphate; Location: [facility]; Administered By: Rush Jewell DPM; Comment: ndc [id number] inj site left foot  o 2.00 - Kenalog Injection 20mg (-3) - 728.71/M72.2, 729.5/M79.672 - 05/10/2021   Injection - Kenalog 20 mg; Dose: 20mg; Site: Not Entered; Route: subcutaneous; Date: 05/10/2021 15:28:38; Exp: 12/31/2021; Lot: [id number]; Mfg: [employer] TradeName: triamcinolone acetonide; Location: [facility]; Administered By: Rush Jewell DPM; Comment: ndc [id number] inj site left foot  o Inj Tendon Sheath Or Ligament ([id number]) - 728.71/M72.2, 729.5/M79.672 - 05/10/2021  · Medications  o Medications have been Reconciled  o Transition of Care or Provider Policy  · Instructions  o Overview: This patient has a plantar fasciitis.  o Discuss Findings: I have discussed the findings of this evaluation with the patient. The discussion included a  complete verbal explanation of any changes in the examination results, diagnosis, and the current treatment plan. A schedule for future care needs was explained. If any questions should arise after returning home, I have encouraged the patient to feel free to contact Dr. Jewell. The patient states understanding and agreement with this plan.  o Monitor For Problems: Pt to monitor for problems and to contact Dr. Jewell for follow-up should such signs occur. Patient states understanding and agreement with this plan.  o Spenco: Recommend OTC Spenco Orthotics.  o Treatement Alternatives: Nonsurgical treatments almost always improve the pain. Treatment can last from several months to 2 years before symptoms get better. Most patients feel better in 9 months. Rarely Some people need surgery to relieve the pain.  o Conservative Treatment Recommendations: Anti-inflammatory medication to reduce pain and inflammation, Heel stretching exercises,   o Discussed proper shoegear for the patient's feet and medical condition.  o Rice Therapy: It is important to treat any injury as soon as possible to help control swelling and increase recovery time. The recognized regimen for immediate treatment of sport injuries includes rest, ice (cold application), compression, and elevation (RICE). Remove the injured athlete from play, apply ice to the affected area, wrap or compress the injured area with an elastic bandage when appropriate, and elevate the injured area above heart level to reduce swelling. The patient is to not use ice for longer than 20 minutes at a time, with at least 20 minutes of no ice usage between applications.   o Patient request PRN follow-up.  o Electronically Identified Patient Education Materials Provided Electronically  · Disposition  o Call or Return if symptoms worsen or persist.            Electronically Signed by: Rush Jewell DPM -Author on May 10, 2021 04:44:49 PM

## 2021-06-07 RX ORDER — LISINOPRIL 10 MG/1
TABLET ORAL
Qty: 90 TABLET | Refills: 1 | Status: SHIPPED | OUTPATIENT
Start: 2021-06-07 | End: 2021-11-15 | Stop reason: SDUPTHER

## 2021-06-07 RX ORDER — PAROXETINE HYDROCHLORIDE 20 MG/1
TABLET, FILM COATED ORAL
Qty: 90 TABLET | Refills: 0 | Status: SHIPPED | OUTPATIENT
Start: 2021-06-07 | End: 2021-07-26 | Stop reason: SDUPTHER

## 2021-06-09 ENCOUNTER — TELEPHONE (OUTPATIENT)
Dept: FAMILY MEDICINE CLINIC | Facility: CLINIC | Age: 81
End: 2021-06-09

## 2021-06-09 NOTE — TELEPHONE ENCOUNTER
Caller: Cristiane Bishop    Relationship: Self    Best call back number: [phone]    What is the best time to reach you: ANY    Who are you requesting to speak with (clinical staff, provider,  specific staff member): CLINICAL    What was the call regarding: PATIENT STATES SHE WOULD LIKE A CALL BACK IN REGARDS TO HER GI REFERRAL TO FURTHER DISCUSS    Do you require a callback: YES

## 2021-06-10 NOTE — TELEPHONE ENCOUNTER
Pt returned call and would like apt with GI referral sooner than July 20th due to her felling bad. I told pt to call the office and see if she could get in sooner and to return here if needed.

## 2021-06-17 DIAGNOSIS — C50.919 MALIGNANT NEOPLASM OF FEMALE BREAST, UNSPECIFIED ESTROGEN RECEPTOR STATUS, UNSPECIFIED LATERALITY, UNSPECIFIED SITE OF BREAST (HCC): Primary | ICD-10-CM

## 2021-06-17 PROBLEM — M85.80 OSTEOPENIA: Status: ACTIVE | Noted: 2021-06-17

## 2021-06-17 PROBLEM — M79.673 HEEL PAIN: Status: ACTIVE | Noted: 2021-06-17

## 2021-06-17 PROBLEM — I10 HTN (HYPERTENSION): Status: ACTIVE | Noted: 2021-06-17

## 2021-06-17 PROBLEM — F32.A DEPRESSION: Status: ACTIVE | Noted: 2021-06-17

## 2021-06-17 PROBLEM — Z85.3 HISTORY OF BREAST CANCER: Status: ACTIVE | Noted: 2021-06-17

## 2021-06-17 PROBLEM — L60.0 INGROWN TOENAIL: Status: ACTIVE | Noted: 2021-06-17

## 2021-06-17 PROBLEM — J30.9 ALLERGIC RHINITIS: Status: ACTIVE | Noted: 2021-06-17

## 2021-06-17 PROBLEM — K21.9 ESOPHAGEAL REFLUX: Status: ACTIVE | Noted: 2021-06-17

## 2021-06-17 PROBLEM — I10 ESSENTIAL HYPERTENSION: Status: ACTIVE | Noted: 2021-06-17

## 2021-06-17 PROBLEM — F39 MOOD DISORDER: Status: ACTIVE | Noted: 2021-06-17

## 2021-06-17 PROBLEM — M77.30 HEEL SPUR: Status: ACTIVE | Noted: 2021-06-17

## 2021-06-17 RX ORDER — OMEPRAZOLE 10 MG/1
CAPSULE, DELAYED RELEASE ORAL
COMMUNITY
End: 2021-07-02

## 2021-06-17 RX ORDER — LETROZOLE 2.5 MG/1
TABLET, FILM COATED ORAL
Status: ON HOLD | COMMUNITY
End: 2021-08-30

## 2021-06-17 RX ORDER — PSEUDOEPHEDRINE HCL 30 MG
TABLET ORAL
COMMUNITY
End: 2021-06-18 | Stop reason: SDUPTHER

## 2021-06-17 RX ORDER — METRONIDAZOLE 500 MG/1
TABLET ORAL
COMMUNITY
Start: 2021-03-19 | End: 2021-09-03

## 2021-06-17 RX ORDER — PANTOPRAZOLE SODIUM 40 MG/1
TABLET, DELAYED RELEASE ORAL
COMMUNITY
End: 2021-06-18 | Stop reason: SDUPTHER

## 2021-06-17 RX ORDER — LORATADINE 10 MG
TABLET,DISINTEGRATING ORAL
COMMUNITY
End: 2021-06-18 | Stop reason: SDUPTHER

## 2021-06-17 RX ORDER — LORATADINE 10 MG/1
10 TABLET ORAL NIGHTLY
COMMUNITY

## 2021-06-17 RX ORDER — DOCUSATE SODIUM 100 MG/1
100 CAPSULE, LIQUID FILLED ORAL NIGHTLY
COMMUNITY

## 2021-06-17 RX ORDER — PANTOPRAZOLE SODIUM 40 MG/1
40 TABLET, DELAYED RELEASE ORAL DAILY
COMMUNITY
Start: 2021-05-30 | End: 2021-07-02

## 2021-06-17 RX ORDER — LORATADINE 10 MG/1
TABLET ORAL
COMMUNITY
End: 2021-06-18 | Stop reason: SDUPTHER

## 2021-06-17 RX ORDER — FLUTICASONE PROPIONATE 50 MCG
SPRAY, SUSPENSION (ML) NASAL
Status: ON HOLD | COMMUNITY
End: 2021-08-30

## 2021-06-18 ENCOUNTER — APPOINTMENT (OUTPATIENT)
Dept: ONCOLOGY | Facility: HOSPITAL | Age: 81
End: 2021-06-18

## 2021-06-18 ENCOUNTER — HOSPITAL ENCOUNTER (OUTPATIENT)
Dept: ONCOLOGY | Facility: HOSPITAL | Age: 81
Setting detail: INFUSION SERIES
Discharge: HOME | End: 2021-06-18

## 2021-06-18 ENCOUNTER — OFFICE VISIT (OUTPATIENT)
Dept: ONCOLOGY | Facility: HOSPITAL | Age: 81
End: 2021-06-18

## 2021-06-18 ENCOUNTER — CLINICAL SUPPORT (OUTPATIENT)
Dept: ONCOLOGY | Facility: HOSPITAL | Age: 81
End: 2021-06-18

## 2021-06-18 VITALS
SYSTOLIC BLOOD PRESSURE: 174 MMHG | HEART RATE: 90 BPM | TEMPERATURE: 98.1 F | RESPIRATION RATE: 18 BRPM | BODY MASS INDEX: 24.41 KG/M2 | OXYGEN SATURATION: 100 % | WEIGHT: 125 LBS | DIASTOLIC BLOOD PRESSURE: 67 MMHG

## 2021-06-18 DIAGNOSIS — D89.2 PARAPROTEINEMIA: ICD-10-CM

## 2021-06-18 DIAGNOSIS — C50.919 MALIGNANT NEOPLASM OF FEMALE BREAST, UNSPECIFIED ESTROGEN RECEPTOR STATUS, UNSPECIFIED LATERALITY, UNSPECIFIED SITE OF BREAST (HCC): Primary | ICD-10-CM

## 2021-06-18 DIAGNOSIS — Z85.3 HISTORY OF BREAST CANCER: Primary | ICD-10-CM

## 2021-06-18 DIAGNOSIS — D50.0 IRON DEFICIENCY ANEMIA DUE TO CHRONIC BLOOD LOSS: ICD-10-CM

## 2021-06-18 LAB
ALBUMIN SERPL-MCNC: 3.88 G/DL (ref 3.5–5.2)
ALBUMIN/GLOB SERPL: 1.1 G/DL
ALP SERPL-CCNC: 78 U/L (ref 39–117)
ALT SERPL W P-5'-P-CCNC: 16 U/L (ref 1–33)
ANION GAP SERPL CALCULATED.3IONS-SCNC: 4.4 MMOL/L (ref 5–15)
AST SERPL-CCNC: 25 U/L (ref 1–32)
BASOPHILS # BLD AUTO: 0.02 10*3/MM3 (ref 0–0.2)
BASOPHILS NFR BLD AUTO: 0.3 % (ref 0–1.5)
BILIRUB SERPL-MCNC: 0.3 MG/DL (ref 0–1.2)
BUN SERPL-MCNC: 16 MG/DL (ref 8–23)
BUN/CREAT SERPL: 21.6 (ref 7–25)
CALCIUM SPEC-SCNC: 9.4 MG/DL (ref 8.6–10.5)
CHLORIDE SERPL-SCNC: 94 MMOL/L (ref 98–107)
CO2 SERPL-SCNC: 28.6 MMOL/L (ref 22–29)
CREAT SERPL-MCNC: 0.74 MG/DL (ref 0.57–1)
DEPRECATED RDW RBC AUTO: 46.9 FL (ref 37–54)
EOSINOPHIL # BLD AUTO: 0.09 10*3/MM3 (ref 0–0.4)
EOSINOPHIL NFR BLD AUTO: 1.4 % (ref 0.3–6.2)
ERYTHROCYTE [DISTWIDTH] IN BLOOD BY AUTOMATED COUNT: 13.4 % (ref 12.3–15.4)
GFR SERPL CREATININE-BSD FRML MDRD: 76 ML/MIN/1.73
GLOBULIN UR ELPH-MCNC: 3.5 GM/DL
GLUCOSE SERPL-MCNC: 130 MG/DL (ref 65–99)
HCT VFR BLD AUTO: 33.6 % (ref 34–46.6)
HGB BLD-MCNC: 11.2 G/DL (ref 12–15.9)
IMM GRANULOCYTES # BLD AUTO: 0.01 10*3/MM3 (ref 0–0.05)
IMM GRANULOCYTES NFR BLD AUTO: 0.2 % (ref 0–0.5)
LYMPHOCYTES # BLD AUTO: 2.32 10*3/MM3 (ref 0.7–3.1)
LYMPHOCYTES NFR BLD AUTO: 35.7 % (ref 19.6–45.3)
MAGNESIUM SERPL-MCNC: 2.1 MG/DL (ref 1.6–2.4)
MCH RBC QN AUTO: 31.6 PG (ref 26.6–33)
MCHC RBC AUTO-ENTMCNC: 33.3 G/DL (ref 31.5–35.7)
MCV RBC AUTO: 94.9 FL (ref 79–97)
MONOCYTES # BLD AUTO: 0.65 10*3/MM3 (ref 0.1–0.9)
MONOCYTES NFR BLD AUTO: 10 % (ref 5–12)
NEUTROPHILS NFR BLD AUTO: 3.41 10*3/MM3 (ref 1.7–7)
NEUTROPHILS NFR BLD AUTO: 52.4 % (ref 42.7–76)
PHOSPHATE SERPL-MCNC: 2.9 MG/DL (ref 2.5–4.5)
PLATELET # BLD AUTO: 267 10*3/MM3 (ref 140–450)
PMV BLD AUTO: 8.5 FL (ref 6–12)
POTASSIUM SERPL-SCNC: 4.5 MMOL/L (ref 3.5–5.2)
PROT SERPL-MCNC: 7.4 G/DL (ref 6–8.5)
RBC # BLD AUTO: 3.54 10*6/MM3 (ref 3.77–5.28)
SODIUM SERPL-SCNC: 127 MMOL/L (ref 136–145)
WBC # BLD AUTO: 6.5 10*3/MM3 (ref 3.4–10.8)

## 2021-06-18 PROCEDURE — 36415 COLL VENOUS BLD VENIPUNCTURE: CPT

## 2021-06-18 PROCEDURE — 80053 COMPREHEN METABOLIC PANEL: CPT

## 2021-06-18 PROCEDURE — 83735 ASSAY OF MAGNESIUM: CPT

## 2021-06-18 PROCEDURE — G0463 HOSPITAL OUTPT CLINIC VISIT: HCPCS

## 2021-06-18 PROCEDURE — 85025 COMPLETE CBC W/AUTO DIFF WBC: CPT

## 2021-06-18 PROCEDURE — 84100 ASSAY OF PHOSPHORUS: CPT

## 2021-06-18 PROCEDURE — 99214 OFFICE O/P EST MOD 30 MIN: CPT | Performed by: INTERNAL MEDICINE

## 2021-06-18 RX ORDER — TAMOXIFEN CITRATE 10 MG/1
TABLET ORAL 2 TIMES DAILY
Status: ON HOLD | COMMUNITY
End: 2021-08-30

## 2021-06-18 NOTE — PROGRESS NOTES
Cristiane Bishop   : 1940     LOCATION: [facility] HEMATOLOGY & ONCOLOGY     Chief Complaint  Breast Cancer (F/U)    Referring Provider: SOLANGE Denson  PCP: Azalea Abdullahi APRN    Oncology/Hematology History Overview Note   HEME/ONC DX/RX/INVESTIGATIONS  DX:   R breast ca, stage I, ER/MS positive, HER2 negative, grade I (low risk).    Anemia undergoing medical evaluation. As of 2021, patient is anticipating GI consultation in the near future.     Hyperglobulinemia, hyponatremia.     RX:   Femara, started in 2015. DC on 6/15/2021.  Prolia Q6m. On , patient declined to continue Prolia.    INVESTIGATIONS:   2015, pathology report, right mastectomy, invasive ductal carcinoma, greatest dimension 7 mm, grade 1, 3 sentinel lymph nodes negative, ER/MS positive, HER-2 negative.    2020, L Mammogram, OTONIEL.    2020, DEXA, CONCLUSION: Normal bone density lumbar spine. Osteopenia in the hips.Bone density in the femoral necks has decreased by 1.9 percent compared to 2018.      Breast cancer in female (CMS/[facility])   2015 Initial Diagnosis    Breast cancer in female (CMS/[facility])     2021 - 2021 Chemotherapy    OP SUPPORTIVE Denosumab (Prolia) Q6M     2021 Cancer Staged    Staging form: Breast, AJCC 8th Edition  - Clinical: Stage IA (cT1, cN0, cM0, G1, ER+, MS+, HER2-) - Signed by Tate Humphries MD on 2021     Breast cancer (CMS/[facility]) (Resolved)   2021 Initial Diagnosis    Breast cancer (CMS/[facility])     2021 Cancer Staged    Staging form: Breast, AJCC 8th Edition  - Clinical: Stage IA (cT1, cN0, cM0, G1, ER+, MS+, HER2-) - Signed by Tate Humphries MD on 2021           Subjective        History of Present Illness   This is a very pleasant 81-year-old white female with a history of right-sided breast cancer.  She was treated with mastectomy followed by adjuvant endocrine therapy with letrozole.  She had low risk disease.  She  was ER/HI positive, HER-2 negative, low-grade.  She came today accompanied by her sister.  We reviewed the history.  We reviewed that she has completed over 5 years of adjuvant letrozole therefore it would be appropriate to discontinue letrozole.  We also discussed the management of osteopenia.  We discussed that since she is starting letrozole it might be easier to manage the osteopenia at this time. The patient would like to discontinue Prolia.  She wants to have the osteopenia managed by primary care. I told her that this would be appropriate.  She will continue vitamin D and calcium.    We also discussed that she has anemia so far consistent with iron deficiency anemia. She reports that was started on ferrous sulfate by primary care and was also referred to gastroenterology but has not yet had endoscopies.  She reports that recently had negative stool guaiac.    Review of Systems   Constitutional: Positive for fatigue (8/10). Negative for appetite change, diaphoresis, fever, unexpected weight gain and unexpected weight loss.   HENT: Negative for hearing loss, mouth sores, sore throat, swollen glands, trouble swallowing and voice change.    Eyes: Negative for blurred vision.   Respiratory: Negative for cough, shortness of breath and wheezing.    Cardiovascular: Negative for chest pain and palpitations.   Gastrointestinal: Positive for indigestion. Negative for abdominal pain, blood in stool, constipation, diarrhea, nausea and vomiting.   Endocrine: Negative for cold intolerance and heat intolerance.   Genitourinary: Negative for difficulty urinating, dysuria, frequency, hematuria and urinary incontinence.   Musculoskeletal: Negative for arthralgias, back pain and myalgias.   Skin: Negative for rash, skin lesions and bruise.   Neurological: Positive for weakness. Negative for dizziness, seizures, numbness and headache.   Hematological: Does not bruise/bleed easily.   Psychiatric/Behavioral: Negative for depressed  mood. The patient is not nervous/anxious.      Current Outpatient Medications on File Prior to Visit   Medication Sig Dispense Refill   • tamoxifen (NOLVADEX) 10 MG tablet Take  by mouth 2 (Two) Times a Day.     • Calcium Carb-Cholecalciferol (Calcium 600+D3) 600-200 MG-UNIT tablet      • docusate sodium (Stool Softener) 100 MG capsule      • fluticasone (FLONASE) 50 MCG/ACT nasal spray      • letrozole (FEMARA) 2.5 MG tablet letrozole 2.5 mg oral tablet take 1 tablet (2.5 mg) by oral route once daily   Active     • lisinopril (PRINIVIL,ZESTRIL) 10 MG tablet TAKE 1 TABLET (10MG) BY MOUTH ONCE DAILY 90 tablet 1   • loratadine (CLARITIN) 10 MG tablet loratadine 10 mg oral tablet take 1 tablet (10 mg) by oral route once daily   Active     • metroNIDAZOLE (FLAGYL) 500 MG tablet TAKE 1 TABLET BY MOUTH THREE TIMES DAILY FOR 14 DAYS     • omeprazole (prilOSEC) 10 MG capsule      • pantoprazole (PROTONIX) 40 MG EC tablet Take 40 mg by mouth Daily.     • PARoxetine (PAXIL) 20 MG tablet TAKE 1 TABLET (20MG) BY MOUTH ONCE DAILY 90 tablet 0   • [DISCONTINUED] docusate sodium 100 MG capsule Stool Softener 100 mg oral capsule take 1 capsule (100 mg) by oral route once daily   Active     • [DISCONTINUED] loratadine (Alavert) 10 MG disintegrating tablet Alavert 10 mg oral tablet,disintegrating take 1 tablet (10 mg) and place on top of the tongue where it will dissolve, then swallow by oral route once daily   Suspended     • [DISCONTINUED] loratadine (Allergy) 10 MG tablet      • [DISCONTINUED] pantoprazole (PROTONIX) 40 MG EC tablet pantoprazole 40 mg oral tablet,delayed release (DR/EC) take 1 tablet (40 mg) by oral route once daily   Active       No current facility-administered medications on file prior to visit.       Allergies   Allergen Reactions   • Penicillins Unknown - Low Severity   • Sulfa Antibiotics Unknown - Low Severity   • Tetracycline Nausea Only and Other (See Comments)     Difficulty swallowing         Past  Medical History:   Diagnosis Date   • Breast cancer (CMS/HCC)    • Hypertension      Past Surgical History:   Procedure Laterality Date   • HYSTERECTOMY     • MASTECTOMY       Social History     Socioeconomic History   • Marital status:      Spouse name: Not on file   • Number of children: Not on file   • Years of education: Not on file   • Highest education level: Not on file   Substance and Sexual Activity   • Alcohol use: Never   • Drug use: Never     Family History   Problem Relation Age of Onset   • Lymphoma Mother    • Lymphoma Maternal Uncle      Immunization History   Administered Date(s) Administered   • COVID-19 (PFIZER) 02/26/2021, 03/19/2021   • Hepatitis A 07/10/2018, 04/01/2019   • Influenza, Unspecified 10/26/2017, 10/03/2018   • Pneumococcal Conjugate 13-Valent (PCV13) 10/17/2016   • Pneumococcal Polysaccharide (PPSV23) 03/26/2018   • Tdap 01/01/2014   • Zostavax 01/01/2014       Objective     Vitals:    06/18/21 1334   BP: 174/67   Pulse: 90   Resp: 18   Temp: 98.1 °F (36.7 °C)   SpO2: 100%   Weight: 56.7 kg (125 lb)   PainSc: 0-No pain     Body mass index is 24.41 kg/m².     Physical Exam    Alert, oriented x3, cooperative, not in distress.  HEENT: Normocephalic, wearing a facemask.  Lungs: No tachypnea.   Extremities: No ankle edema.  Neurologic: Moves all extremities.      Iron   Date Value Ref Range Status   03/06/2021 40 (L) 60 - 170 ug/dL Final     Iron Saturation   Date Value Ref Range Status   03/06/2021 10 (L) 20 - 55 % Final     Transferrin   Date Value Ref Range Status   03/06/2021 269.00 250.00 - 380.00 mg/dL Final     TIBC   Date Value Ref Range Status   03/06/2021 385 245 - 450 ug/dL Final     Comment:     As of 10/15/03, the chemistry department began utilizing a Transferrin  assay. Data suggests that Transferrin is a better estimate of Total Iron  binding capacity than the TIBC assay. As a result the TIBC will now be a  calculated estimate from the measured Transferrin.        Vitamin B-12   Date Value Ref Range Status   03/04/2021 513 211 - 911 pg/mL Final     Folate   Date Value Ref Range Status   03/04/2021 >20.0 4.8 - 20.0 ng/mL Final     Comment:     Results may be falsely decreased due to light exposure if  testing added on after collection.       ECOG score: 1               Result Review :   The following data was reviewed by: Tate Humphries MD on 06/18/2021:  Lab Results   Component Value Date    HGB 11.2 (L) 06/18/2021    HCT 33.6 (L) 06/18/2021    MCV 94.9 06/18/2021     06/18/2021    WBC 6.50 06/18/2021    NEUTROABS 3.41 06/18/2021    LYMPHSABS 2.32 06/18/2021    MONOSABS 0.65 06/18/2021    EOSABS 0.09 06/18/2021    BASOSABS 0.02 06/18/2021     Lab Results   Component Value Date    GLUCOSE 130 (H) 06/18/2021    BUN 16 06/18/2021    CREATININE 0.74 06/18/2021     (L) 06/18/2021    K 4.5 06/18/2021    CL 94 (L) 06/18/2021    CO2 28.6 06/18/2021    CALCIUM 9.4 06/18/2021    PROTEINTOT 7.4 06/18/2021    ALBUMIN 3.88 06/18/2021    BILITOT 0.3 06/18/2021    ALKPHOS 78 06/18/2021    AST 25 06/18/2021    ALT 16 06/18/2021          Assessment and Plan      ASSESSMENT  History of right-sided stage I, ER positive, HER2 negative breast cancer, low-grade, s/p R MRM.  She completed over 5 years of adjuvant letrozole. There has been no evidence of recurrence.    Osteopenia previously on Prolia, last injection 6 months ago.    Anemia undergoing evaluation by primary care, GI appointment is pending, per patient.      She was previously also noted to have hyperglobulinemia and hyponatremia therefore alternative causes of the anemia should be considered, such as plasma cell disorder.     PLAN/RECOMMENDATIONS  Discontinue letrozole.    The patient wants to discontinue Prolia and follow-up with primary care regarding management of osteopenia.    Surveillance mammogram of the left breast in 8/2021.    Heme-onc follow-up in 9/2021 with previsit CBC, CMP, anemia profile, paraprotein  studies. Review the mammogram but also need to address the anemia.  If she remains with iron deficiency despite ferrous sulfate, parenteral iron may be indicated.    Diagnoses and all orders for this visit:    1. History of breast cancer (Primary)  -     Mammo Diagnostic Digital Tomosynthesis Left With CAD; Future    2. Iron deficiency anemia due to chronic blood loss  -     CBC Auto Differential; Future  -     Comprehensive Metabolic Panel; Future  -     Ferritin; Future  -     Iron Profile; Future  -     Vitamin B12 & Folate; Future    3. Paraproteinemia  -     IgG, IgA, IgM; Future  -     D[place]ota / Lambda Light Chains, Urine, 24 Hour - Urine, Clean Catch; Future  -     VINCENT + PE; Future        Patient was given instructions and counseling regarding her condition or for health maintenance advice. Please see specific information pulled into the AVS if appropriate.     Tate Humphries MD    6/18/2021

## 2021-07-02 RX ORDER — PANTOPRAZOLE SODIUM 40 MG/1
TABLET, DELAYED RELEASE ORAL
Qty: 90 TABLET | Refills: 0 | Status: SHIPPED | OUTPATIENT
Start: 2021-07-02 | End: 2021-07-21

## 2021-07-15 VITALS
HEIGHT: 60 IN | BODY MASS INDEX: 24.74 KG/M2 | TEMPERATURE: 97.5 F | DIASTOLIC BLOOD PRESSURE: 83 MMHG | HEART RATE: 74 BPM | WEIGHT: 126 LBS | OXYGEN SATURATION: 100 % | SYSTOLIC BLOOD PRESSURE: 173 MMHG

## 2021-07-21 ENCOUNTER — PREP FOR SURGERY (OUTPATIENT)
Dept: OTHER | Facility: HOSPITAL | Age: 81
End: 2021-07-21

## 2021-07-21 ENCOUNTER — OFFICE VISIT (OUTPATIENT)
Dept: GASTROENTEROLOGY | Facility: CLINIC | Age: 81
End: 2021-07-21

## 2021-07-21 ENCOUNTER — OFFICE VISIT (OUTPATIENT)
Dept: FAMILY MEDICINE CLINIC | Facility: CLINIC | Age: 81
End: 2021-07-21

## 2021-07-21 VITALS
SYSTOLIC BLOOD PRESSURE: 168 MMHG | BODY MASS INDEX: 25 KG/M2 | TEMPERATURE: 96.9 F | HEART RATE: 81 BPM | DIASTOLIC BLOOD PRESSURE: 100 MMHG | WEIGHT: 128 LBS | OXYGEN SATURATION: 96 %

## 2021-07-21 VITALS
HEIGHT: 60 IN | DIASTOLIC BLOOD PRESSURE: 60 MMHG | HEART RATE: 76 BPM | SYSTOLIC BLOOD PRESSURE: 155 MMHG | WEIGHT: 126 LBS | TEMPERATURE: 97.7 F | BODY MASS INDEX: 24.74 KG/M2

## 2021-07-21 DIAGNOSIS — R30.0 BURNING WITH URINATION: Primary | ICD-10-CM

## 2021-07-21 DIAGNOSIS — A04.8 BACTERIAL INFECTION DUE TO H. PYLORI: Primary | ICD-10-CM

## 2021-07-21 DIAGNOSIS — R12 HEARTBURN: ICD-10-CM

## 2021-07-21 DIAGNOSIS — D50.9 IRON DEFICIENCY ANEMIA, UNSPECIFIED IRON DEFICIENCY ANEMIA TYPE: ICD-10-CM

## 2021-07-21 DIAGNOSIS — D50.0 IRON DEFICIENCY ANEMIA DUE TO CHRONIC BLOOD LOSS: ICD-10-CM

## 2021-07-21 LAB
BILIRUB BLD-MCNC: NEGATIVE MG/DL
CLARITY, POC: ABNORMAL
COLOR UR: YELLOW
GLUCOSE UR STRIP-MCNC: NEGATIVE MG/DL
KETONES UR QL: NEGATIVE
LEUKOCYTE EST, POC: ABNORMAL
NITRITE UR-MCNC: NEGATIVE MG/ML
PH UR: 7 [PH] (ref 5–8)
PROT UR STRIP-MCNC: ABNORMAL MG/DL
RBC # UR STRIP: ABNORMAL /UL
SP GR UR: 1.02 (ref 1–1.03)
UROBILINOGEN UR QL: NORMAL

## 2021-07-21 PROCEDURE — 99214 OFFICE O/P EST MOD 30 MIN: CPT | Performed by: NURSE PRACTITIONER

## 2021-07-21 PROCEDURE — 87086 URINE CULTURE/COLONY COUNT: CPT | Performed by: FAMILY MEDICINE

## 2021-07-21 PROCEDURE — 81003 URINALYSIS AUTO W/O SCOPE: CPT | Performed by: FAMILY MEDICINE

## 2021-07-21 PROCEDURE — 99213 OFFICE O/P EST LOW 20 MIN: CPT | Performed by: FAMILY MEDICINE

## 2021-07-21 RX ORDER — NITROFURANTOIN 25; 75 MG/1; MG/1
100 CAPSULE ORAL 2 TIMES DAILY
Qty: 14 CAPSULE | Refills: 0 | Status: SHIPPED | OUTPATIENT
Start: 2021-07-21 | End: 2021-07-28

## 2021-07-21 RX ORDER — POLYETHYLENE GLYCOL 3350, SODIUM SULFATE ANHYDROUS, SODIUM BICARBONATE, SODIUM CHLORIDE, POTASSIUM CHLORIDE 227.1; 21.5; 6.36; 5.53; .754 G/L; G/L; G/L; G/L; G/L
4 POWDER, FOR SOLUTION ORAL DAILY
Qty: 1 EACH | Refills: 0 | Status: SHIPPED | OUTPATIENT
Start: 2021-07-21 | End: 2021-07-22

## 2021-07-21 RX ORDER — PHENAZOPYRIDINE HYDROCHLORIDE 200 MG/1
200 TABLET, FILM COATED ORAL 3 TIMES DAILY PRN
Qty: 6 TABLET | Refills: 0 | Status: SHIPPED | OUTPATIENT
Start: 2021-07-21 | End: 2021-09-03

## 2021-07-21 RX ORDER — ERGOCALCIFEROL (VITAMIN D2) 10 MCG
400 TABLET ORAL DAILY
COMMUNITY
End: 2021-09-03

## 2021-07-21 RX ORDER — OMEPRAZOLE 40 MG/1
40 CAPSULE, DELAYED RELEASE ORAL DAILY
Qty: 90 CAPSULE | Refills: 0 | Status: SHIPPED | OUTPATIENT
Start: 2021-07-21 | End: 2021-10-13

## 2021-07-21 RX ORDER — OMEPRAZOLE 20 MG/1
20 CAPSULE, DELAYED RELEASE ORAL DAILY
COMMUNITY
End: 2021-07-21 | Stop reason: DRUGHIGH

## 2021-07-21 NOTE — PROGRESS NOTES
Chief Complaint  Urinary Tract Infection    Jolie Bishop presents to [facility]     History of Present Illness    1.) DYSURIA : Onset - 1 week ago. Patient reports burning w/ urination. She deniess any other sxs --- no abdominopelvic pain, no hematuria, no flank pain, no fevers or chills.     Objective      Vital Signs:     /100   Pulse 81   Temp 96.9 °F (36.1 °C)   Wt 58.1 kg (128 lb)   SpO2 96%   BMI 25.00 kg/m²       Physical Exam  Vitals reviewed.   Constitutional:       General: She is not in acute distress.     Appearance: Normal appearance. She is well-developed.   HENT:      Head: Normocephalic and atraumatic.      Right Ear: Hearing and external ear normal.      Left Ear: Hearing and external ear normal.      Nose: Nose normal.   Eyes:      General: Lids are normal.         Right eye: No discharge.         Left eye: No discharge.      Conjunctiva/sclera: Conjunctivae normal.   Pulmonary:      Effort: Pulmonary effort is normal.   Abdominal:      Tenderness: There is no abdominal tenderness.   Musculoskeletal:         General: No swelling.      Cervical back: Neck supple.      Comments: No tenderness with percussion of bilateral flank regions   Skin:     Coloration: Skin is not jaundiced.      Findings: No erythema.   Neurological:      Mental Status: She is alert. Mental status is at baseline.   Psychiatric:         Mood and Affect: Mood and affect normal.         Thought Content: Thought content normal.     Assessment and Plan    Diagnoses and all orders for this visit:    1. Burning with urination (Primary)  Comments:  1.) UA as noted. Will send for culture. Empiric treatment as noted.  Orders:  -     POCT urinalysis dipstick, automated  -     Urine Culture - Urine, Urine, Clean Catch    Other orders  -     nitrofurantoin, macrocrystal-monohydrate, (Macrobid) 100 MG capsule; Take 1 capsule by mouth 2 (Two) Times a Day for 7 days.   Dispense: 14 capsule; Refill: 0  -     phenazopyridine (Pyridium) 200 MG tablet; Take 1 tablet by mouth 3 (Three) Times a Day As Needed for Bladder Spasms.  Dispense: 6 tablet; Refill: 0    Follow Up     Return if symptoms worsen or fail to improve.     Patient was given instructions and counseling regarding her condition or for health maintenance advice. Please see specific information pulled into the AVS if appropriate.

## 2021-07-21 NOTE — PROGRESS NOTES
Patient Name: Cristiane Bishop   Visit Date: 07/21/2021   Patient ID: [id number]  Provider: ORLIN Mccain    Sex: female  Location:  Location Address:  Location Phone: [phone] [place] [address] [phone]    YOB: 1940  Age: 81 y.o.   Primary Care Provider Azalea Abdullahi APRN      Referring Provider: No ref. provider found        Chief Complaint  stool study    History of Present Illness     New pt presents w c/o stool + hpylori and couldn't take antibiotics d/t allergic to PCN and tetracycline, she was tried on Flagyl and tetracycline but states it made her feel more ill. States was tested d/t mouth burning. No abd pain w meals. Some c/o indigestion, worsened by allergy drainage.    Some c/o HB, has been on PPI x 4-5 yrs, was on Protonix 40 and started adding Omeprazole 20 OTC.   No blood in stool or black stool, no diarrhea. No unint wt loss. Good appetite.  Last colonoscopy 2011 Dr Vale, pt requests to have screening colon done w EGD.    Has had COVID vaccine  Denies cardiopulmonary hx.       Past Medical History:   Diagnosis Date   • Allergic rhinitis    • Breast cancer (CMS/HCC)    • GERD (gastroesophageal reflux disease)    • Heel pain    • Heel spur    • HTN (hypertension)    • Hypertension    • Ingrown toenail    • Mood disorder (CMS/HCC)        Past Surgical History:   Procedure Laterality Date   • ABDOMINAL HYSTERECTOMY     • APPENDECTOMY     • BREAST BIOPSY Right    • HYSTERECTOMY     • MASTECTOMY     • MASTECTOMY Right 05/2015         Current Outpatient Medications:   •  Calcium Carb-Cholecalciferol (Calcium 600+D3) 600-200 MG-UNIT tablet, , Disp: , Rfl:   •  docusate sodium (Stool Softener) 100 MG capsule, , Disp: , Rfl:   •  fluticasone (FLONASE) 50 MCG/ACT nasal spray, , Disp: , Rfl:   •  lisinopril (PRINIVIL,ZESTRIL) 10 MG tablet, TAKE 1 TABLET (10MG) BY MOUTH ONCE DAILY, Disp: 90 tablet, Rfl: 1  •  loratadine (CLARITIN) 10 MG tablet, loratadine 10  "mg oral tablet take 1 tablet (10 mg) by oral route once daily   Active, Disp: , Rfl:   •  PARoxetine (PAXIL) 20 MG tablet, TAKE 1 TABLET (20MG) BY MOUTH ONCE DAILY, Disp: 90 tablet, Rfl: 0  •  Vitamin D, Cholecalciferol, (CHOLECALCIFEROL) 10 MCG (400 UNIT) tablet, Take 400 Units by mouth Daily., Disp: , Rfl:   •  letrozole (FEMARA) 2.5 MG tablet, letrozole 2.5 mg oral tablet take 1 tablet (2.5 mg) by oral route once daily   Active, Disp: , Rfl:   •  metroNIDAZOLE (FLAGYL) 500 MG tablet, TAKE 1 TABLET BY MOUTH THREE TIMES DAILY FOR 14 DAYS, Disp: , Rfl:   •  omeprazole (priLOSEC) 40 MG capsule, Take 1 capsule by mouth Daily for 90 days., Disp: 90 capsule, Rfl: 0  •  PEG 3350-KCl-NaBcb-NaCl-NaSulf (Golytely) 227.1 g pack, Take 4 L by mouth Daily for 1 day. Take per office instructions, Disp: 1 each, Rfl: 0  •  tamoxifen (NOLVADEX) 10 MG tablet, Take  by mouth 2 (Two) Times a Day., Disp: , Rfl:      Allergies   Allergen Reactions   • Penicillins Unknown - Low Severity   • Sulfa Antibiotics Unknown - Low Severity   • Tetracycline Nausea Only and Other (See Comments)     Difficulty swallowing         Family History   Problem Relation Age of Onset   • Lymphoma Mother    • Breast cancer Mother    • Lymphoma Maternal Uncle    • Diabetes Maternal Uncle    • Heart disease Other    • Diabetes Paternal Uncle    • Lymphoma Other    • Stroke Other    • Colon cancer Neg Hx         Social History     Tobacco Use   • Smoking status: Never Smoker   • Smokeless tobacco: Never Used   Vaping Use   • Vaping Use: Never used   Substance Use Topics   • Alcohol use: Never   • Drug use: Never       Objective     Vital Signs:   /60 (BP Location: Left arm, Patient Position: Sitting, Cuff Size: Adult)   Pulse 76   Temp 97.7 °F (36.5 °C) (Temporal)   Ht 152.4 cm (60\")   Wt 57.2 kg (126 lb)   BMI 24.61 kg/m²       Physical Exam  Constitutional:       General: He is not in acute distress.     Appearance: Normal appearance.   HENT:      " Head: Normocephalic and atraumatic.      Nose: Nose normal.   Pulmonary:      Effort: Pulmonary effort is normal. No respiratory distress.   Abdominal:      General: Abdomen is flat.      Palpations: Abdomen is soft. There is no mass.      Tenderness: There is no abdominal tenderness. There is no guarding.   Musculoskeletal:      Cervical back: Neck supple.      Right lower leg: No edema.      Left lower leg: No edema.   Skin:     General: Skin is warm and dry.   Neurological:      General: No focal deficit present.      Mental Status: He is alert and oriented to person, place, and time.      Gait: Gait normal.   Psychiatric:         Mood and Affect: Mood normal.         Speech: Speech normal.         Behavior: Behavior normal.         Thought Content: Thought content normal.     Result Review :   The following data was reviewed by: ORLIN Mccain on 07/21/2021:  CMP    CMP 11/24/20 12/18/20 12/18/20 6/18/21     1041 1041    Glucose    130 (A)   Glucose 87      BUN 12   16   Creatinine 0.97 (A) 0.81  0.74   eGFR Non African Am    76   Sodium 135   127 (A)   Potassium 4.8   4.5   Chloride 96 (A)   94 (A)   Calcium 10.4  10.0 9.4   Albumin    3.88   Total Bilirubin    0.3   Alkaline Phosphatase    78   AST (SGOT)    25   ALT (SGPT)    16   (A) Abnormal value            CBC    CBC 10/27/20 3/18/21 6/18/21   WBC 6.40 7.53 6.50   RBC 4.08 (A) 3.71 (A) 3.54 (A)   Hemoglobin 12.3 11.6 (A) 11.2 (A)   Hematocrit 38.3 35.5 (A) 33.6 (A)   MCV 93.9 95.7 94.9   MCH 30.1 31.3 (A) 31.6   MCHC 32.1 (A) 32.7 (A) 33.3   RDW 13.2 13.0 13.4   Platelets 295 272 267   (A) Abnormal value                      Assessment and Plan    Diagnoses and all orders for this visit:    1. Bacterial infection due to H. pylori (Primary)  Comments:  stool study + hpylori    2. Heartburn    3. Iron deficiency anemia, unspecified iron deficiency anemia type    Other orders  -     PEG 3350-KCl-NaBcb-NaCl-NaSulf (Golytely) 227.1 g pack; Take  4 L by mouth Daily for 1 day. Take per office instructions  Dispense: 1 each; Refill: 0  -     omeprazole (priLOSEC) 40 MG capsule; Take 1 capsule by mouth Daily for 90 days.  Dispense: 90 capsule; Refill: 0            Follow Up      Increase Omeprazole to 40 mg/day  D/C Protonix  EGD/COLONOSCOPY Surgical Risk and Benefits: Possible risks/complications, benefits, and alternatives to surgical or invasive procedure have been explained to patient and/or legal guardian; Patient has been evaluated and can tolerate anesthesia and/or sedation. Risks, benefits, and alternatives to anesthesia and sedation have been explained to patient and/or legal guardian.   Will await results of EGD before attempting to treat hpylori as pt is not very symptomatic at this time and treatment options are limited with her drug allergies.   Patient was given instructions and counseling regarding her condition or for health maintenance advice. Please see specific information pulled into the AVS if appropriate.

## 2021-07-22 LAB — BACTERIA SPEC AEROBE CULT: NO GROWTH

## 2021-07-26 ENCOUNTER — OFFICE VISIT (OUTPATIENT)
Dept: FAMILY MEDICINE CLINIC | Facility: CLINIC | Age: 81
End: 2021-07-26

## 2021-07-26 VITALS
BODY MASS INDEX: 24.33 KG/M2 | DIASTOLIC BLOOD PRESSURE: 80 MMHG | OXYGEN SATURATION: 99 % | SYSTOLIC BLOOD PRESSURE: 120 MMHG | HEART RATE: 83 BPM | WEIGHT: 124.6 LBS | TEMPERATURE: 98.2 F

## 2021-07-26 DIAGNOSIS — F32.A ANXIETY AND DEPRESSION: Primary | ICD-10-CM

## 2021-07-26 DIAGNOSIS — F41.9 ANXIETY AND DEPRESSION: Primary | ICD-10-CM

## 2021-07-26 DIAGNOSIS — D50.9 IRON DEFICIENCY ANEMIA, UNSPECIFIED IRON DEFICIENCY ANEMIA TYPE: ICD-10-CM

## 2021-07-26 DIAGNOSIS — J01.10 ACUTE NON-RECURRENT FRONTAL SINUSITIS: ICD-10-CM

## 2021-07-26 DIAGNOSIS — D89.2 PARAPROTEINEMIA: ICD-10-CM

## 2021-07-26 DIAGNOSIS — R53.83 FATIGUE, UNSPECIFIED TYPE: ICD-10-CM

## 2021-07-26 DIAGNOSIS — R31.9 HEMATURIA, UNSPECIFIED TYPE: ICD-10-CM

## 2021-07-26 LAB
ALBUMIN SERPL-MCNC: 3.6 G/DL (ref 3.5–5.2)
ALBUMIN/GLOB SERPL: 1 G/DL
ALP SERPL-CCNC: 80 U/L (ref 39–117)
ALT SERPL W P-5'-P-CCNC: 42 U/L (ref 1–33)
ANION GAP SERPL CALCULATED.3IONS-SCNC: 12.8 MMOL/L (ref 5–15)
AST SERPL-CCNC: 47 U/L (ref 1–32)
BACTERIA UR QL AUTO: ABNORMAL /HPF
BASOPHILS # BLD AUTO: 0.01 10*3/MM3 (ref 0–0.2)
BASOPHILS NFR BLD AUTO: 0.1 % (ref 0–1.5)
BILIRUB SERPL-MCNC: 0.5 MG/DL (ref 0–1.2)
BILIRUB UR QL STRIP: NEGATIVE
BUN SERPL-MCNC: 16 MG/DL (ref 8–23)
BUN/CREAT SERPL: 18.2 (ref 7–25)
CALCIUM SPEC-SCNC: 9.1 MG/DL (ref 8.6–10.5)
CHLORIDE SERPL-SCNC: 93 MMOL/L (ref 98–107)
CLARITY UR: CLEAR
CO2 SERPL-SCNC: 26.2 MMOL/L (ref 22–29)
COLOR UR: ABNORMAL
CREAT SERPL-MCNC: 0.88 MG/DL (ref 0.57–1)
DEPRECATED RDW RBC AUTO: 42.5 FL (ref 37–54)
EOSINOPHIL # BLD AUTO: 0.31 10*3/MM3 (ref 0–0.4)
EOSINOPHIL NFR BLD AUTO: 3.7 % (ref 0.3–6.2)
ERYTHROCYTE [DISTWIDTH] IN BLOOD BY AUTOMATED COUNT: 12.2 % (ref 12.3–15.4)
FOLATE SERPL-MCNC: >20 NG/ML (ref 4.78–24.2)
GFR SERPL CREATININE-BSD FRML MDRD: 62 ML/MIN/1.73
GLOBULIN UR ELPH-MCNC: 3.6 GM/DL
GLUCOSE SERPL-MCNC: 192 MG/DL (ref 65–99)
GLUCOSE UR STRIP-MCNC: NEGATIVE MG/DL
HCT VFR BLD AUTO: 36.2 % (ref 34–46.6)
HGB BLD-MCNC: 11.9 G/DL (ref 12–15.9)
HGB UR QL STRIP.AUTO: NEGATIVE
HYALINE CASTS UR QL AUTO: ABNORMAL /LPF
IMM GRANULOCYTES # BLD AUTO: 0.04 10*3/MM3 (ref 0–0.05)
IMM GRANULOCYTES NFR BLD AUTO: 0.5 % (ref 0–0.5)
IRON 24H UR-MRATE: 36 MCG/DL (ref 37–145)
IRON SATN MFR SERPL: 11 % (ref 20–50)
KETONES UR QL STRIP: ABNORMAL
LEUKOCYTE ESTERASE UR QL STRIP.AUTO: ABNORMAL
LYMPHOCYTES # BLD AUTO: 0.65 10*3/MM3 (ref 0.7–3.1)
LYMPHOCYTES NFR BLD AUTO: 7.8 % (ref 19.6–45.3)
MCH RBC QN AUTO: 31.3 PG (ref 26.6–33)
MCHC RBC AUTO-ENTMCNC: 32.9 G/DL (ref 31.5–35.7)
MCV RBC AUTO: 95.3 FL (ref 79–97)
MONOCYTES # BLD AUTO: 0.54 10*3/MM3 (ref 0.1–0.9)
MONOCYTES NFR BLD AUTO: 6.5 % (ref 5–12)
NEUTROPHILS NFR BLD AUTO: 6.75 10*3/MM3 (ref 1.7–7)
NEUTROPHILS NFR BLD AUTO: 81.4 % (ref 42.7–76)
NITRITE UR QL STRIP: NEGATIVE
NRBC BLD AUTO-RTO: 0 /100 WBC (ref 0–0.2)
PH UR STRIP.AUTO: 6.5 [PH] (ref 5–8)
PLATELET # BLD AUTO: 311 10*3/MM3 (ref 140–450)
PMV BLD AUTO: 9.4 FL (ref 6–12)
POTASSIUM SERPL-SCNC: 3.9 MMOL/L (ref 3.5–5.2)
PROT SERPL-MCNC: 7.2 G/DL (ref 6–8.5)
PROT UR QL STRIP: ABNORMAL
RBC # BLD AUTO: 3.8 10*6/MM3 (ref 3.77–5.28)
RBC # UR: ABNORMAL /HPF
REF LAB TEST METHOD: ABNORMAL
SARS-COV-2 RNA RESP QL NAA+PROBE: NOT DETECTED
SODIUM SERPL-SCNC: 132 MMOL/L (ref 136–145)
SP GR UR STRIP: 1.01 (ref 1–1.03)
SQUAMOUS #/AREA URNS HPF: ABNORMAL /HPF
T4 FREE SERPL-MCNC: 1.07 NG/DL (ref 0.93–1.7)
TIBC SERPL-MCNC: 317 MCG/DL (ref 298–536)
TRANSFERRIN SERPL-MCNC: 213 MG/DL (ref 200–360)
TSH SERPL DL<=0.05 MIU/L-ACNC: 2.94 UIU/ML (ref 0.27–4.2)
UROBILINOGEN UR QL STRIP: ABNORMAL
VIT B12 BLD-MCNC: 543 PG/ML (ref 211–946)
WBC # BLD AUTO: 8.3 10*3/MM3 (ref 3.4–10.8)
WBC UR QL AUTO: ABNORMAL /HPF

## 2021-07-26 PROCEDURE — 82607 VITAMIN B-12: CPT | Performed by: FAMILY MEDICINE

## 2021-07-26 PROCEDURE — U0005 INFEC AGEN DETEC AMPLI PROBE: HCPCS | Performed by: FAMILY MEDICINE

## 2021-07-26 PROCEDURE — 99214 OFFICE O/P EST MOD 30 MIN: CPT | Performed by: FAMILY MEDICINE

## 2021-07-26 PROCEDURE — 84439 ASSAY OF FREE THYROXINE: CPT | Performed by: FAMILY MEDICINE

## 2021-07-26 PROCEDURE — 81001 URINALYSIS AUTO W/SCOPE: CPT | Performed by: FAMILY MEDICINE

## 2021-07-26 PROCEDURE — 82746 ASSAY OF FOLIC ACID SERUM: CPT | Performed by: FAMILY MEDICINE

## 2021-07-26 PROCEDURE — C9803 HOPD COVID-19 SPEC COLLECT: HCPCS | Performed by: FAMILY MEDICINE

## 2021-07-26 PROCEDURE — 84466 ASSAY OF TRANSFERRIN: CPT | Performed by: FAMILY MEDICINE

## 2021-07-26 PROCEDURE — 83540 ASSAY OF IRON: CPT | Performed by: FAMILY MEDICINE

## 2021-07-26 PROCEDURE — 84443 ASSAY THYROID STIM HORMONE: CPT | Performed by: FAMILY MEDICINE

## 2021-07-26 PROCEDURE — U0003 INFECTIOUS AGENT DETECTION BY NUCLEIC ACID (DNA OR RNA); SEVERE ACUTE RESPIRATORY SYNDROME CORONAVIRUS 2 (SARS-COV-2) (CORONAVIRUS DISEASE [COVID-19]), AMPLIFIED PROBE TECHNIQUE, MAKING USE OF HIGH THROUGHPUT TECHNOLOGIES AS DESCRIBED BY CMS-2020-01-R: HCPCS | Performed by: FAMILY MEDICINE

## 2021-07-26 PROCEDURE — 80053 COMPREHEN METABOLIC PANEL: CPT | Performed by: FAMILY MEDICINE

## 2021-07-26 PROCEDURE — 36415 COLL VENOUS BLD VENIPUNCTURE: CPT | Performed by: FAMILY MEDICINE

## 2021-07-26 PROCEDURE — 85025 COMPLETE CBC W/AUTO DIFF WBC: CPT | Performed by: FAMILY MEDICINE

## 2021-07-26 RX ORDER — PAROXETINE HYDROCHLORIDE 20 MG/1
20 TABLET, FILM COATED ORAL EVERY MORNING
Qty: 90 TABLET | Refills: 1 | Status: SHIPPED | OUTPATIENT
Start: 2021-07-26 | End: 2022-01-13 | Stop reason: SDUPTHER

## 2021-07-26 RX ORDER — ONDANSETRON 4 MG/1
4 TABLET, FILM COATED ORAL EVERY 8 HOURS PRN
Qty: 90 TABLET | Refills: 0 | Status: SHIPPED | OUTPATIENT
Start: 2021-07-26 | End: 2021-09-03

## 2021-07-26 RX ORDER — AZITHROMYCIN 250 MG/1
TABLET, FILM COATED ORAL
Qty: 6 TABLET | Refills: 0 | Status: SHIPPED | OUTPATIENT
Start: 2021-07-26 | End: 2021-09-03

## 2021-07-26 RX ORDER — POLYETHYLENE GLYCOL-3350 AND ELECTROLYTES 236; 6.74; 5.86; 2.97; 22.74 G/274.31G; G/274.31G; G/274.31G; G/274.31G; G/274.31G
1 POWDER, FOR SOLUTION ORAL AS NEEDED
COMMUNITY
Start: 2021-07-21 | End: 2021-09-03

## 2021-07-26 NOTE — PATIENT INSTRUCTIONS
Helicobacter Pylori Infection  Helicobacter pylori infection is a bacterial infection in the stomach. Long-term (chronic) infection can cause stomach irritation (gastritis), ulcers in the stomach (gastric ulcers), and ulcers in the upper part of the intestine (duodenal ulcers). Having this infection may also increase your risk of stomach cancer and a type of white blood cell cancer (lymphoma) that affects the stomach.  What are the causes?  This infection is caused by the Helicobacter pylori (H. pylori) bacteria. Many healthy people have this bacteria in their stomach lining. The bacteria may also spread from person to person through contact with stool (feces) or saliva. It is not known why some people develop ulcers, gastritis, or cancer from the bacteria.  What increases the risk?  You are more likely to develop this condition if you:  · Have family members with the infection.  · Live with many other people, such as in a dormitory.  · Are of , , or  descent.  What are the signs or symptoms?  Most people with this infection do not have any symptoms. If you do have symptoms, they may include:  · Heartburn.  · Stomach pain.  · Nausea.  · Vomiting. The vomit may be bloody because of ulcers.  · Loss of appetite.  · Bad breath.  How is this diagnosed?  This condition may be diagnosed based on:  · Your symptoms and medical history.  · A physical exam.  · Blood tests.  · Stool tests.  · A breath test.  · A procedure that involves placing a tube with a camera on the end of it down your throat to examine your stomach and upper intestine (upper endoscopy).  · Removing and testing a tissue sample from the stomach lining (biopsy). A biopsy may be taken during an upper endoscopy.  How is this treated?    This condition is treated by taking a combination of medicines (triple therapy) for several weeks. Triple therapy includes one medicine to reduce the amount of acid in your stomach and two types of  antibiotic medicines. This treatment may reduce your risk of cancer.  You may need to be tested for H. pylori again after treatment. In some cases, the treatment may need to be repeated if your treatment did not get rid of all the bacteria.  Follow these instructions at home:    · Take over-the-counter and prescription medicines only as told by your health care provider.  · Take your antibiotics as told by your health care provider. Do not stop taking the antibiotics even if you start to feel better.  · Return to your normal activities as told by your health care provider. Ask your health care provider what activities are safe for you.  · Take steps to prevent future infections:  ? Wash your hands often with soap and water. If soap and water are not available, use hand .  ? Do not eat food or drink water that may have had contact with stool or saliva.  · Keep all follow-up visits as told by your health care provider. This is important. You may need tests to make sure your treatment worked.  Contact a health care provider if your symptoms:  · Do not get better with treatment.  · Return after treatment.  Summary  · Helicobacter pylori infection is a stomach infection caused by the Helicobacter pylori (H. pylori) bacteria.  · This infection can cause stomach irritation (gastritis), ulcers in the stomach (gastric ulcers), and ulcers in the upper part of the intestine (duodenal ulcers).  · This condition is treated by taking a combination of medicines (triple therapy) for several weeks.  · Take your antibiotics as told by your health care provider. Do not stop taking the antibiotics even if you start to feel better.  This information is not intended to replace advice given to you by your health care provider. Make sure you discuss any questions you have with your health care provider.  Document Revised: 04/09/2020 Document Reviewed: 12/11/2018  [employer] Patient Education © 2021 [employer]

## 2021-07-26 NOTE — PROGRESS NOTES
Venipuncture Blood Specimen Collection  Venipuncture performed in left arm  by Patricia Morris with good hemostasis. Patient tolerated the procedure well without complications.   07/26/21   Patricia Morris

## 2021-07-26 NOTE — PROGRESS NOTES
Chief Complaint  Nausea (Follow up from UTI still nauseated. Recent visit with gastro also and could not toelrate medicine for POS Hpylori. Scope scheduoled for August.)    Subjective          Cristiane Bishop presents to [facility]  Pt has had depression and anxiety that is uncontrolled- she had dropped her dose of paroetine to 10mg daily from 20mg- she wants to go back to 20mg daily.    Pt had UTI recently and was given antibiotic- symptoms have resolved now- urine did show hematuria that needs to be rechecked    Pt seeing GI for h. Pylori- they are treating this- will get scope this next month and they will decide if pt needs any further tx    Depression  Visit Type: follow-up  Patient presents with the following symptoms: depressed mood, excessive worry and nausea.  Patient is not experiencing: anhedonia, chest pain, choking sensation, compulsions, confusion, decreased concentration, dizziness, dry mouth, fatigue, feelings of hopelessness, feelings of worthlessness, hypersomnia, hyperventilation, insomnia, irritability, malaise, memory impairment, muscle tension, nervousness/anxiety, obsessions, palpitations, panic, restlessness, shortness of breath, suicidal ideas and suicidal planning.  Frequency of symptoms: most days   Severity: moderate   Sleep quality: good  Nighttime awakenings: none  Compliance with medications:  %  Treatment side effects: no side effects.  Anxiety  Presents for follow-up visit. Symptoms include depressed mood, excessive worry and nausea. Patient reports no chest pain, compulsions, confusion, decreased concentration, dizziness, dry mouth, feeling of choking, hyperventilation, insomnia, irritability, malaise, muscle tension, nervous/anxious behavior, obsessions, palpitations, panic, restlessness, shortness of breath or suicidal ideas. Symptoms occur most days. The severity of symptoms is moderate. The quality of sleep is good. Nighttime  awakenings: none.     Her past medical history is significant for depression. Compliance with medications is %. Treatment side effects: no side effects.   Fatigue  This is a new problem. The current episode started in the past 7 days. The problem occurs constantly. The problem has been gradually worsening. Associated symptoms include fatigue and nausea. Pertinent negatives include no abdominal pain, anorexia, arthralgias, change in bowel habit, chest pain, chills, congestion, coughing, diaphoresis, fever, headaches, joint swelling, myalgias, neck pain, numbness, rash, sore throat, swollen glands, vertigo, visual change, vomiting or weakness. Nothing aggravates the symptoms. She has tried nothing for the symptoms.   URI   This is a new problem. The current episode started in the past 7 days. The problem has been gradually worsening. There has been no fever. Associated symptoms include nausea and sinus pain. Pertinent negatives include no abdominal pain, chest pain, congestion, coughing, diarrhea, dysuria, ear pain, headaches, joint pain, joint swelling, neck pain, plugged ear sensation, rash, rhinorrhea, sneezing, sore throat, swollen glands, vomiting or wheezing. She has tried nothing for the symptoms.       Objective   Allergies   Allergen Reactions   • Penicillins Rash   • Sulfa Antibiotics Rash   • Tetracycline Nausea Only and Rash     Difficulty swallowing       Immunization History   Administered Date(s) Administered   • COVID-19 (PFIZER) 02/26/2021, 03/19/2021   • Fluad Quad 65+ 10/06/2020   • Hepatitis A 07/10/2018, 04/01/2019   • Influenza, Unspecified 10/26/2017, 10/03/2018   • Pneumococcal Conjugate 13-Valent (PCV13) 10/17/2016   • Pneumococcal Polysaccharide (PPSV23) 03/26/2018   • Shingrix 10/06/2020, 12/05/2020   • Tdap 01/01/2014, 09/15/2014   • Zostavax 01/01/2014       Vital Signs:   Vitals:    07/26/21 0926   BP: 120/80   Pulse: 83   Temp: 98.2 °F (36.8 °C)   SpO2: 99%   Weight: 56.5 kg (124  lb 9.6 oz)       Physical Exam  Vitals reviewed.   Constitutional:       Appearance: Normal appearance. She is well-developed.   HENT:      Head: Normocephalic and atraumatic.      Comments: Bilateral frontal sinus tenderness     Right Ear: External ear normal.      Left Ear: External ear normal.      Nose: Nose normal.      Mouth/Throat:      Mouth: Mucous membranes are moist.      Pharynx: Oropharynx is clear. No oropharyngeal exudate.   Eyes:      Conjunctiva/sclera: Conjunctivae normal.      Pupils: Pupils are equal, round, and reactive to light.   Neck:      Thyroid: No thyroid mass, thyromegaly or thyroid tenderness.   Cardiovascular:      Rate and Rhythm: Normal rate and regular rhythm.      Pulses: Normal pulses.      Heart sounds: Normal heart sounds. No murmur heard.   No friction rub. No gallop.    Pulmonary:      Effort: Pulmonary effort is normal.      Breath sounds: Normal breath sounds. No wheezing or rhonchi.   Abdominal:      General: Bowel sounds are normal. There is no distension.      Palpations: Abdomen is soft.      Tenderness: There is no abdominal tenderness.   Lymphadenopathy:      Cervical: No cervical adenopathy.   Skin:     General: Skin is warm and dry.   Neurological:      Mental Status: She is alert and oriented to person, place, and time.      Cranial Nerves: No cranial nerve deficit.   Psychiatric:         Mood and Affect: Mood and affect normal.         Behavior: Behavior normal.         Thought Content: Thought content normal.         Judgment: Judgment normal.        Result Review :   The following data was reviewed by: Rodger Jones MD on 07/26/2021:  CMP    CMP 11/24/20 12/18/20 12/18/20 6/18/21     1041 1041    Glucose    130 (A)   Glucose 87      BUN 12   16   Creatinine 0.97 (A) 0.81  0.74   eGFR Non African Am    76   Sodium 135   127 (A)   Potassium 4.8   4.5   Chloride 96 (A)   94 (A)   Calcium 10.4  10.0 9.4   Albumin    3.88   Total Bilirubin    0.3   Alkaline  Phosphatase    78   AST (SGOT)    25   ALT (SGPT)    16   (A) Abnormal value            CBC    CBC 10/27/20 3/18/21 6/18/21   WBC 6.40 7.53 6.50   RBC 4.08 (A) 3.71 (A) 3.54 (A)   Hemoglobin 12.3 11.6 (A) 11.2 (A)   Hematocrit 38.3 35.5 (A) 33.6 (A)   MCV 93.9 95.7 94.9   MCH 30.1 31.3 (A) 31.6   MCHC 32.1 (A) 32.7 (A) 33.3   RDW 13.2 13.0 13.4   Platelets 295 272 267   (A) Abnormal value            Lipid Panel    Lipid Panel 10/27/20   Total Cholesterol 186   Triglycerides 255 (A)   HDL Cholesterol 38 (A)   VLDL Cholesterol 51 (A)   LDL Cholesterol  97   (A) Abnormal value       Comments are available for some flowsheets but are not being displayed.                     Assessment and Plan    Diagnoses and all orders for this visit:    1. Anxiety and depression (Primary)  -     PARoxetine (PAXIL) 20 MG tablet; Take 1 tablet by mouth Every Morning.  Dispense: 90 tablet; Refill: 1    2. Hematuria, unspecified type  -     Urinalysis With Culture If Indicated -    3. Fatigue, unspecified type  -     CBC Auto Differential  -     Comprehensive Metabolic Panel  -     TSH+Free T4  -     Vitamin B12 & Folate  -     COVID-19,[facility],NP/OP Swab in Transport Media or ESwab 72 HR TAT - Swab, Nasopharynx    4. Acute non-recurrent frontal sinusitis  -     azithromycin (Zithromax Z-Ervin) 250 MG tablet; Take 2 tablets by mouth on day 1, then 1 tablet daily on days 2-5  Dispense: 6 tablet; Refill: 0    5. Iron deficiency anemia, unspecified iron deficiency anemia type  -     Iron Profile    Other orders  -     ondansetron (Zofran) 4 MG tablet; Take 1 tablet by mouth Every 8 (Eight) Hours As Needed for Nausea or Vomiting.  Dispense: 90 tablet; Refill: 0            Follow Up   Return in about 1 month (around 8/26/2021) for Recheck.  Patient was given instructions and counseling regarding her condition or for health maintenance advice. Please see specific information pulled into the AVS if appropriate.

## 2021-08-19 ENCOUNTER — TELEPHONE (OUTPATIENT)
Dept: ONCOLOGY | Facility: HOSPITAL | Age: 81
End: 2021-08-19

## 2021-08-19 NOTE — TELEPHONE ENCOUNTER
Called patient @ [phone] and left voicemail stating that we need to reschedule appt currently scheduled on 9/16/21 due to lynn will not be in office this week.

## 2021-08-19 NOTE — TELEPHONE ENCOUNTER
Caller: HAMILTON    Relationship: PATIENT     Best call back number: [phone]     What was the call regarding: WARM TRANSFERRED HAMILTON TO [facility]IL IN THE OFFICE TO RESCHEDULE 09/16 APPT SINCE APRN WILL BE OUT OF OFFICE THAT DAY

## 2021-08-30 ENCOUNTER — ANESTHESIA (OUTPATIENT)
Dept: GASTROENTEROLOGY | Facility: HOSPITAL | Age: 81
End: 2021-08-30

## 2021-08-30 ENCOUNTER — ANESTHESIA EVENT (OUTPATIENT)
Dept: GASTROENTEROLOGY | Facility: HOSPITAL | Age: 81
End: 2021-08-30

## 2021-08-30 ENCOUNTER — HOSPITAL ENCOUNTER (OUTPATIENT)
Facility: HOSPITAL | Age: 81
Setting detail: HOSPITAL OUTPATIENT SURGERY
Discharge: HOME | End: 2021-08-30
Attending: INTERNAL MEDICINE | Admitting: INTERNAL MEDICINE

## 2021-08-30 VITALS
WEIGHT: 124.34 LBS | RESPIRATION RATE: 14 BRPM | BODY MASS INDEX: 24.41 KG/M2 | SYSTOLIC BLOOD PRESSURE: 186 MMHG | TEMPERATURE: 97.5 F | HEART RATE: 58 BPM | HEIGHT: 60 IN | DIASTOLIC BLOOD PRESSURE: 76 MMHG | OXYGEN SATURATION: 98 %

## 2021-08-30 DIAGNOSIS — D50.0 IRON DEFICIENCY ANEMIA DUE TO CHRONIC BLOOD LOSS: ICD-10-CM

## 2021-08-30 DIAGNOSIS — R12 HEARTBURN: ICD-10-CM

## 2021-08-30 DIAGNOSIS — A04.8 BACTERIAL INFECTION DUE TO H. PYLORI: ICD-10-CM

## 2021-08-30 PROCEDURE — 88305 TISSUE EXAM BY PATHOLOGIST: CPT | Performed by: INTERNAL MEDICINE

## 2021-08-30 PROCEDURE — 43239 EGD BIOPSY SINGLE/MULTIPLE: CPT | Performed by: INTERNAL MEDICINE

## 2021-08-30 PROCEDURE — G0105 COLORECTAL SCRN; HI RISK IND: HCPCS | Performed by: INTERNAL MEDICINE

## 2021-08-30 PROCEDURE — 25010000002 PROPOFOL 10 MG/ML EMULSION: Performed by: NURSE ANESTHETIST, CERTIFIED REGISTERED

## 2021-08-30 RX ORDER — LIDOCAINE HYDROCHLORIDE 20 MG/ML
INJECTION, SOLUTION INFILTRATION; PERINEURAL AS NEEDED
Status: DISCONTINUED | OUTPATIENT
Start: 2021-08-30 | End: 2021-08-30 | Stop reason: SURG

## 2021-08-30 RX ORDER — SODIUM CHLORIDE, SODIUM LACTATE, POTASSIUM CHLORIDE, CALCIUM CHLORIDE 600; 310; 30; 20 MG/100ML; MG/100ML; MG/100ML; MG/100ML
30 INJECTION, SOLUTION INTRAVENOUS CONTINUOUS
Status: DISCONTINUED | OUTPATIENT
Start: 2021-08-30 | End: 2021-08-30 | Stop reason: HOSPADM

## 2021-08-30 RX ORDER — PROPOFOL 10 MG/ML
VIAL (ML) INTRAVENOUS AS NEEDED
Status: DISCONTINUED | OUTPATIENT
Start: 2021-08-30 | End: 2021-08-30 | Stop reason: SURG

## 2021-08-30 RX ADMIN — SODIUM CHLORIDE, POTASSIUM CHLORIDE, SODIUM LACTATE AND CALCIUM CHLORIDE: 600; 310; 30; 20 INJECTION, SOLUTION INTRAVENOUS at 12:12

## 2021-08-30 RX ADMIN — PROPOFOL 50 MG: 10 INJECTION, EMULSION INTRAVENOUS at 12:09

## 2021-08-30 RX ADMIN — PROPOFOL 200 MCG/KG/MIN: 10 INJECTION, EMULSION INTRAVENOUS at 12:09

## 2021-08-30 RX ADMIN — LIDOCAINE HYDROCHLORIDE 40 MG: 20 INJECTION, SOLUTION INFILTRATION; PERINEURAL at 12:13

## 2021-08-30 NOTE — ANESTHESIA PREPROCEDURE EVALUATION
Anesthesia Evaluation     Patient summary reviewed and Nursing notes reviewed   no history of anesthetic complications:  NPO Solid Status: > 8 hours  NPO Liquid Status: > 2 hours           Airway   Dental      Pulmonary - negative pulmonary ROS and normal exam   Cardiovascular - normal exam  Exercise tolerance: good (4-7 METS)    (+) hypertension,       Neuro/Psych  (+) psychiatric history,     GI/Hepatic/Renal/Endo    (+)  GERD,      Musculoskeletal (-) negative ROS    Abdominal  - normal exam   Substance History - negative use     OB/GYN negative ob/gyn ROS         Other      history of cancer                    Anesthesia Plan    ASA 3     general   (Total IV Anesthesia    Patient understands anesthesia not responsible for dental damage.  )  intravenous induction     Anesthetic plan, all risks, benefits, and alternatives have been provided, discussed and informed consent has been obtained with: patient.    Plan discussed with CRNA.

## 2021-08-30 NOTE — ANESTHESIA POSTPROCEDURE EVALUATION
Patient: Cristiane Bishop    Procedure Summary     Date: 08/30/21 Room / Location: [facility] ENDOSCOPY 1 / [facility] END[facility]COPY    Anesthesia Start: 1206 Anesthesia Stop: 1250    Procedures:       ESOPHAGOGASTRODUODENOSCOPY WITH BIOPSY (N/A )      COLONOSCOPY (N/A ) Diagnosis:       Bacterial infection due to H. pylori      Iron deficiency anemia due to chronic blood loss      Heartburn      (Bacterial infection due to H. pylori [A04.8])      (Iron deficiency anemia due to chronic blood loss [D50.0])      (Heartburn [R12])    Surgeons: Norman Damon MD Provider: Goldberg, Michael, MD    Anesthesia Type: general ASA Status: 3          Anesthesia Type: general    Vitals  Vitals Value Taken Time   /76 08/30/21 1303   Temp 36.4 °C (97.5 °F) 08/30/21 1303   Pulse 58 08/30/21 1303   Resp 14 08/30/21 1258   SpO2 98 % 08/30/21 1303           Post Anesthesia Care and Evaluation    Patient location during evaluation: bedside  Patient participation: complete - patient participated  Level of consciousness: awake  Pain score: 0  Pain management: adequate  Airway patency: patent  Anesthetic complications: No anesthetic complications  PONV Status: none  Cardiovascular status: acceptable and stable  Respiratory status: acceptable and room air  Hydration status: acceptable    Comments: An Anesthesiologist personally participated in the most demanding procedures (including induction and emergence if applicable) in the anesthesia plan, monitored the course of anesthesia administration at frequent intervals and remained physically present and available for immediate diagnosis and treatment of emergencies.

## 2021-08-31 LAB
CYTO UR: NORMAL
LAB AP CASE REPORT: NORMAL
LAB AP CLINICAL INFORMATION: NORMAL
PATH REPORT.FINAL DX SPEC: NORMAL
PATH REPORT.GROSS SPEC: NORMAL

## 2021-09-03 ENCOUNTER — OFFICE VISIT (OUTPATIENT)
Dept: FAMILY MEDICINE CLINIC | Facility: CLINIC | Age: 81
End: 2021-09-03

## 2021-09-03 VITALS
TEMPERATURE: 96.6 F | OXYGEN SATURATION: 97 % | HEIGHT: 59 IN | DIASTOLIC BLOOD PRESSURE: 92 MMHG | SYSTOLIC BLOOD PRESSURE: 150 MMHG | WEIGHT: 126 LBS | BODY MASS INDEX: 25.4 KG/M2 | HEART RATE: 91 BPM

## 2021-09-03 DIAGNOSIS — R79.89 LOW SERUM SODIUM: ICD-10-CM

## 2021-09-03 DIAGNOSIS — D50.9 IRON DEFICIENCY ANEMIA, UNSPECIFIED IRON DEFICIENCY ANEMIA TYPE: Primary | ICD-10-CM

## 2021-09-03 DIAGNOSIS — A04.8 HELICOBACTER PYLORI (H. PYLORI) INFECTION: ICD-10-CM

## 2021-09-03 DIAGNOSIS — R73.9 ELEVATED BLOOD SUGAR: ICD-10-CM

## 2021-09-03 PROCEDURE — 99214 OFFICE O/P EST MOD 30 MIN: CPT | Performed by: NURSE PRACTITIONER

## 2021-09-03 RX ORDER — FERROUS SULFATE 325(65) MG
325 TABLET ORAL
COMMUNITY
End: 2022-05-18

## 2021-09-03 NOTE — PROGRESS NOTES
"Chief Complaint  Follow-up (was here 2 wks ago and was told to follow up )    Subjective            Cristiane Bishop presents to [facility]  History of Present Illness     Patient reports that she was seen a few weeks ago by Dr. Jones and was advised to follow-up to discuss abnormal labs.    Labs reviewed with the patient.  Patient noted to have mild iron deficiency anemia, but normal B12 and folate.  Normal TSH and free T4.  Covid test was negative.    Her CMP revealed elevation in blood sugar, 192; however, patient notes that she was not fasting at the time of blood draw.  Additionally, her sodium was low at 132, chloride low at 93, and mild elevation in ALT and AST 42 and 47 respectively.    Patient is currently taking Paxil 20 mg daily.  She reports that this has been increased; however, records do not show that she was taking anything other than Paxil 20 mg daily for some time now.    Additionally, patient has been to gastroenterology.  She had an EGD and colonoscopy earlier this week.  Colonoscopy was unremarkable.  EGD did show a hiatal hernia and patchy erythema of the stomach.  Biopsies were positive for H. pylori.  She has been treated previously for H. pylori.  Per her report, they told her that sometimes the virus just \"lays dormant\".  She should have a follow-up with them in the next 1 to 2 weeks to discuss her pathology.  She is not sure if they are going to retreat her or not.    She is also taking iron supplement for the mild iron deficiency anemia.    She has established care with a new hematologist oncologist now that Dr. Paz has retired.  She reports that that hematologist told her to no longer take letrozole or Prolia.    She currently denies fatigue.  Denies nausea, vomiting, abdominal pain, constipation, diarrhea, rectal bleeding or blood in her stool.  Denies melena.  She states that she feels much better.        Past Medical History:   Diagnosis Date   • " Allergic rhinitis    • Breast cancer (CMS/HCC)     RIGHT   • GERD (gastroesophageal reflux disease)    • Heel pain    • Heel spur    • HTN (hypertension)    • Hypertension    • Ingrown toenail    • Mood disorder (CMS/HCC)        Allergies   Allergen Reactions   • Penicillins Rash   • Sulfa Antibiotics Rash   • Tetracycline Nausea Only and Rash     Difficulty swallowing          Past Surgical History:   Procedure Laterality Date   • ABDOMINAL HYSTERECTOMY     • APPENDECTOMY     • BREAST BIOPSY Right    • COLONOSCOPY N/A 8/30/2021    Procedure: COLONOSCOPY;  Surgeon: Norman Damon MD;  Location: [facility] ENDOSCOPY;  Service: Gastroenterology;  Laterality: N/A;  NORMAL COLON   • ENDOSCOPY N/A 8/30/2021    Procedure: ESOPHAGOGASTRODUODENOSCOPY WITH BIOPSY;  Surgeon: Norman Damon MD;  Location: [facility] ENDOSCOPY;  Service: Gastroenterology;  Laterality: N/A;  GASTRITIS/HIATAL HERNIA   • HYSTERECTOMY     • MASTECTOMY     • MASTECTOMY Right 05/2015        Social History     Tobacco Use   • Smoking status: Never Smoker   • Smokeless tobacco: Never Used   Vaping Use   • Vaping Use: Never used   Substance Use Topics   • Alcohol use: Never   • Drug use: Never       Family History   Problem Relation Age of Onset   • Lymphoma Mother    • Breast cancer Mother    • Lymphoma Maternal Uncle    • Diabetes Maternal Uncle    • Heart disease Other    • Diabetes Paternal Uncle    • Lymphoma Other    • Stroke Other    • Colon cancer Neg Hx    • Malig Hyperthermia Neg Hx         Health Maintenance Due   Topic Date Due   • HEPATITIS C SCREENING  Never done   • ANNUAL WELLNESS VISIT  06/04/2021   • MAMMOGRAM  08/28/2021        Current Outpatient Medications on File Prior to Visit   Medication Sig   • Calcium Carb-Cholecalciferol (Calcium 600+D3) 600-200 MG-UNIT tablet    • docusate sodium (Stool Softener) 100 MG capsule    • ferrous sulfate 325 (65 FE) MG tablet Take 325 mg by mouth Daily With Breakfast.   • lisinopril  "(PRINIVIL,ZESTRIL) 10 MG tablet TAKE 1 TABLET (10MG) BY MOUTH ONCE DAILY   • loratadine (CLARITIN) 10 MG tablet loratadine 10 mg oral tablet take 1 tablet (10 mg) by oral route once daily   Active   • omeprazole (priLOSEC) 40 MG capsule Take 1 capsule by mouth Daily for 90 days.   • PARoxetine (PAXIL) 20 MG tablet Take 1 tablet by mouth Every Morning.   • [DISCONTINUED] azithromycin (Zithromax Z-Ervin) 250 MG tablet Take 2 tablets by mouth on day 1, then 1 tablet daily on days 2-5   • [DISCONTINUED] GaviLyte-G 236 g solution Take 1 mL by mouth As Needed.   • [DISCONTINUED] metroNIDAZOLE (FLAGYL) 500 MG tablet TAKE 1 TABLET BY MOUTH THREE TIMES DAILY FOR 14 DAYS   • [DISCONTINUED] ondansetron (Zofran) 4 MG tablet Take 1 tablet by mouth Every 8 (Eight) Hours As Needed for Nausea or Vomiting.   • [DISCONTINUED] phenazopyridine (Pyridium) 200 MG tablet Take 1 tablet by mouth 3 (Three) Times a Day As Needed for Bladder Spasms.   • [DISCONTINUED] Vitamin D, Cholecalciferol, (CHOLECALCIFEROL) 10 MCG (400 UNIT) tablet Take 400 Units by mouth Daily.     No current facility-administered medications on file prior to visit.       Immunization History   Administered Date(s) Administered   • COVID-19 (PFIZER) 02/26/2021, 03/19/2021   • Fluad Quad 65+ 10/06/2020   • Hepatitis A 07/10/2018, 04/01/2019   • Influenza, Unspecified 10/26/2017, 10/03/2018   • Pneumococcal Conjugate 13-Valent (PCV13) 10/17/2016   • Pneumococcal Polysaccharide (PPSV23) 03/26/2018   • Shingrix 10/06/2020, 12/05/2020   • Tdap 01/01/2014, 09/15/2014   • Zostavax 01/01/2014       Review of Systems     Objective     /92   Pulse 91   Temp 96.6 °F (35.9 °C)   Ht 149.9 cm (59\")   Wt 57.2 kg (126 lb)   SpO2 97%   BMI 25.45 kg/m²       Physical Exam  Vitals reviewed.   Constitutional:       General: She is not in acute distress.     Appearance: Normal appearance. She is well-developed.   HENT:      Head: Normocephalic and atraumatic.   Eyes:      General: " No scleral icterus.  Cardiovascular:      Rate and Rhythm: Normal rate and regular rhythm.      Pulses: Normal pulses.      Heart sounds: No murmur heard.     Pulmonary:      Effort: Pulmonary effort is normal.      Breath sounds: Normal breath sounds. No wheezing or rhonchi.   Musculoskeletal:         General: Normal range of motion.      Right lower leg: No edema.      Left lower leg: No edema.   Skin:     General: Skin is warm and dry.   Neurological:      Mental Status: She is alert and oriented to person, place, and time.   Psychiatric:         Mood and Affect: Mood and affect normal.         Behavior: Behavior normal.         Thought Content: Thought content normal.         Judgment: Judgment normal.         Result Review :     The following data was reviewed by: ORLIN Denson on 09/03/2021:    CBC Auto Differential (07/26/2021 09:57)  Comprehensive Metabolic Panel (07/26/2021 09:57)  TSH+Free T4 (07/26/2021 09:57)  Vitamin B12 & Folate (07/26/2021 09:57)  Urinalysis With Culture If Indicated - Urine, Clean Catch (07/26/2021 09:57)  Iron Profile (07/26/2021 09:57)  COVID PRE-OP / PRE-PROCEDURE SCREENING ORDER (NO ISOLATION) - Swab, Nasopharynx (07/26/2021 09:57)  Urinalysis, Microscopic Only - Urine, Clean Catch (07/26/2021 09:57)    Data reviewed: GI studies : EGD/colonoscopy     COLONOSCOPY (08/30/2021 12:05)    UPPER GI ENDOSCOPY (08/30/2021 12:05)    Tissue Pathology Exam (08/30/2021 12:40)               Assessment and Plan      Diagnoses and all orders for this visit:    1. Iron deficiency anemia, unspecified iron deficiency anemia type (Primary)    2. Helicobacter pylori (H. pylori) infection    3. Elevated blood sugar  -     Comprehensive Metabolic Panel    4. Low serum sodium  -     Comprehensive Metabolic Panel            Follow Up {Instructions Charge Capture  Follow-up Communications :23}    Return in about 7 weeks (around 10/25/2021) for Recheck - follow up on iron deficiency.      Follow up with GI in the next 1-2 weeks to discuss plan for treatment/surveillance of h. Pylori.     Patient was given instructions and counseling regarding her condition or for health maintenance advice. Please see specific information pulled into the AVS if appropriate.

## 2021-09-05 DIAGNOSIS — F41.9 ANXIETY AND DEPRESSION: ICD-10-CM

## 2021-09-05 DIAGNOSIS — F32.A ANXIETY AND DEPRESSION: ICD-10-CM

## 2021-09-07 ENCOUNTER — CLINICAL SUPPORT (OUTPATIENT)
Dept: FAMILY MEDICINE CLINIC | Facility: CLINIC | Age: 81
End: 2021-09-07

## 2021-09-07 DIAGNOSIS — D50.9 IRON DEFICIENCY ANEMIA, UNSPECIFIED IRON DEFICIENCY ANEMIA TYPE: ICD-10-CM

## 2021-09-07 LAB
ALBUMIN SERPL-MCNC: 4.2 G/DL (ref 3.5–5.2)
ALBUMIN/GLOB SERPL: 1.2 G/DL
ALP SERPL-CCNC: 114 U/L (ref 39–117)
ALT SERPL W P-5'-P-CCNC: 9 U/L (ref 1–33)
ANION GAP SERPL CALCULATED.3IONS-SCNC: 8.8 MMOL/L (ref 5–15)
AST SERPL-CCNC: 20 U/L (ref 1–32)
BILIRUB SERPL-MCNC: 0.3 MG/DL (ref 0–1.2)
BUN SERPL-MCNC: 18 MG/DL (ref 8–23)
BUN/CREAT SERPL: 19.8 (ref 7–25)
CALCIUM SPEC-SCNC: 9.7 MG/DL (ref 8.6–10.5)
CHLORIDE SERPL-SCNC: 97 MMOL/L (ref 98–107)
CO2 SERPL-SCNC: 28.2 MMOL/L (ref 22–29)
CREAT SERPL-MCNC: 0.91 MG/DL (ref 0.57–1)
GFR SERPL CREATININE-BSD FRML MDRD: 60 ML/MIN/1.73
GLOBULIN UR ELPH-MCNC: 3.4 GM/DL
GLUCOSE SERPL-MCNC: 92 MG/DL (ref 65–99)
POTASSIUM SERPL-SCNC: 4.2 MMOL/L (ref 3.5–5.2)
PROT SERPL-MCNC: 7.6 G/DL (ref 6–8.5)
SODIUM SERPL-SCNC: 134 MMOL/L (ref 136–145)

## 2021-09-07 PROCEDURE — 36415 COLL VENOUS BLD VENIPUNCTURE: CPT | Performed by: NURSE PRACTITIONER

## 2021-09-07 PROCEDURE — 80053 COMPREHEN METABOLIC PANEL: CPT | Performed by: NURSE PRACTITIONER

## 2021-09-07 NOTE — PROGRESS NOTES
Venipuncture Blood Specimen Collection  Venipuncture performed in left arm by Chanell Bowser with good hemostasis. Patient tolerated the procedure well without complications.   09/07/21   Chanell Bowser

## 2021-09-08 RX ORDER — PAROXETINE HYDROCHLORIDE 20 MG/1
TABLET, FILM COATED ORAL
Qty: 90 TABLET | Refills: 0 | OUTPATIENT
Start: 2021-09-08

## 2021-09-09 ENCOUNTER — HOSPITAL ENCOUNTER (OUTPATIENT)
Dept: MAMMOGRAPHY | Facility: HOSPITAL | Age: 81
Discharge: HOME | End: 2021-09-09
Admitting: INTERNAL MEDICINE

## 2021-09-09 DIAGNOSIS — Z85.3 HISTORY OF BREAST CANCER: ICD-10-CM

## 2021-09-09 PROCEDURE — 77063 BREAST TOMOSYNTHESIS BI: CPT

## 2021-09-09 PROCEDURE — 77067 SCR MAMMO BI INCL CAD: CPT

## 2021-09-15 ENCOUNTER — LAB (OUTPATIENT)
Dept: LAB | Facility: HOSPITAL | Age: 81
End: 2021-09-15

## 2021-09-15 DIAGNOSIS — D89.2 PARAPROTEINEMIA: ICD-10-CM

## 2021-09-15 DIAGNOSIS — D50.0 IRON DEFICIENCY ANEMIA DUE TO CHRONIC BLOOD LOSS: ICD-10-CM

## 2021-09-15 LAB
ALBUMIN SERPL-MCNC: 4.1 G/DL (ref 3.5–5.2)
ALBUMIN/GLOB SERPL: 1.2 G/DL
ALP SERPL-CCNC: 114 U/L (ref 39–117)
ALT SERPL W P-5'-P-CCNC: 12 U/L (ref 1–33)
ANION GAP SERPL CALCULATED.3IONS-SCNC: 11.3 MMOL/L (ref 5–15)
AST SERPL-CCNC: 25 U/L (ref 1–32)
BASOPHILS # BLD AUTO: 0.03 10*3/MM3 (ref 0–0.2)
BASOPHILS NFR BLD AUTO: 0.5 % (ref 0–1.5)
BILIRUB SERPL-MCNC: 0.4 MG/DL (ref 0–1.2)
BUN SERPL-MCNC: 18 MG/DL (ref 8–23)
BUN/CREAT SERPL: 19.1 (ref 7–25)
CALCIUM SPEC-SCNC: 9.8 MG/DL (ref 8.6–10.5)
CHLORIDE SERPL-SCNC: 99 MMOL/L (ref 98–107)
CO2 SERPL-SCNC: 24.7 MMOL/L (ref 22–29)
CREAT SERPL-MCNC: 0.94 MG/DL (ref 0.57–1)
DEPRECATED RDW RBC AUTO: 45.2 FL (ref 37–54)
EOSINOPHIL # BLD AUTO: 0.16 10*3/MM3 (ref 0–0.4)
EOSINOPHIL NFR BLD AUTO: 2.9 % (ref 0.3–6.2)
ERYTHROCYTE [DISTWIDTH] IN BLOOD BY AUTOMATED COUNT: 12.9 % (ref 12.3–15.4)
FERRITIN SERPL-MCNC: 156.6 NG/ML (ref 13–150)
FOLATE SERPL-MCNC: >20 NG/ML (ref 4.78–24.2)
GFR SERPL CREATININE-BSD FRML MDRD: 57 ML/MIN/1.73
GLOBULIN UR ELPH-MCNC: 3.3 GM/DL
GLUCOSE SERPL-MCNC: 157 MG/DL (ref 65–99)
HCT VFR BLD AUTO: 37.6 % (ref 34–46.6)
HGB BLD-MCNC: 12.2 G/DL (ref 12–15.9)
IMM GRANULOCYTES # BLD AUTO: 0.03 10*3/MM3 (ref 0–0.05)
IMM GRANULOCYTES NFR BLD AUTO: 0.5 % (ref 0–0.5)
IRON 24H UR-MRATE: 93 MCG/DL (ref 37–145)
IRON SATN MFR SERPL: 26 % (ref 20–50)
LYMPHOCYTES # BLD AUTO: 1.8 10*3/MM3 (ref 0.7–3.1)
LYMPHOCYTES NFR BLD AUTO: 32.2 % (ref 19.6–45.3)
MCH RBC QN AUTO: 31.3 PG (ref 26.6–33)
MCHC RBC AUTO-ENTMCNC: 32.4 G/DL (ref 31.5–35.7)
MCV RBC AUTO: 96.4 FL (ref 79–97)
MONOCYTES # BLD AUTO: 0.53 10*3/MM3 (ref 0.1–0.9)
MONOCYTES NFR BLD AUTO: 9.5 % (ref 5–12)
NEUTROPHILS NFR BLD AUTO: 3.04 10*3/MM3 (ref 1.7–7)
NEUTROPHILS NFR BLD AUTO: 54.4 % (ref 42.7–76)
NRBC BLD AUTO-RTO: 0 /100 WBC (ref 0–0.2)
PLATELET # BLD AUTO: 289 10*3/MM3 (ref 140–450)
PMV BLD AUTO: 10.2 FL (ref 6–12)
POTASSIUM SERPL-SCNC: 4.1 MMOL/L (ref 3.5–5.2)
PROT SERPL-MCNC: 7.4 G/DL (ref 6–8.5)
RBC # BLD AUTO: 3.9 10*6/MM3 (ref 3.77–5.28)
SODIUM SERPL-SCNC: 135 MMOL/L (ref 136–145)
TIBC SERPL-MCNC: 362 MCG/DL (ref 298–536)
TRANSFERRIN SERPL-MCNC: 243 MG/DL (ref 200–360)
VIT B12 BLD-MCNC: 455 PG/ML (ref 211–946)
WBC # BLD AUTO: 5.59 10*3/MM3 (ref 3.4–10.8)

## 2021-09-15 PROCEDURE — 82607 VITAMIN B-12: CPT

## 2021-09-15 PROCEDURE — 83540 ASSAY OF IRON: CPT

## 2021-09-15 PROCEDURE — 80053 COMPREHEN METABOLIC PANEL: CPT

## 2021-09-15 PROCEDURE — 85025 COMPLETE CBC W/AUTO DIFF WBC: CPT

## 2021-09-15 PROCEDURE — 82728 ASSAY OF FERRITIN: CPT

## 2021-09-15 PROCEDURE — 82784 ASSAY IGA/IGD/IGG/IGM EACH: CPT

## 2021-09-15 PROCEDURE — 36415 COLL VENOUS BLD VENIPUNCTURE: CPT

## 2021-09-15 PROCEDURE — 82746 ASSAY OF FOLIC ACID SERUM: CPT

## 2021-09-15 PROCEDURE — 86334 IMMUNOFIX E-PHORESIS SERUM: CPT

## 2021-09-15 PROCEDURE — 84466 ASSAY OF TRANSFERRIN: CPT

## 2021-09-15 PROCEDURE — 84165 PROTEIN E-PHORESIS SERUM: CPT

## 2021-09-16 LAB
ALBUMIN SERPL ELPH-MCNC: 3.7 G/DL (ref 2.9–4.4)
ALBUMIN/GLOB SERPL: 1.1 {RATIO} (ref 0.7–1.7)
ALPHA1 GLOB SERPL ELPH-MCNC: 0.2 G/DL (ref 0–0.4)
ALPHA2 GLOB SERPL ELPH-MCNC: 0.8 G/DL (ref 0.4–1)
B-GLOBULIN SERPL ELPH-MCNC: 1.1 G/DL (ref 0.7–1.3)
GAMMA GLOB SERPL ELPH-MCNC: 1.5 G/DL (ref 0.4–1.8)
GLOBULIN SER-MCNC: 3.6 G/DL (ref 2.2–3.9)
IGA SERPL-MCNC: 311 MG/DL (ref 64–422)
IGG SERPL-MCNC: 1372 MG/DL (ref 586–1602)
IGM SERPL-MCNC: 176 MG/DL (ref 26–217)
INTERPRETATION SERPL IEP-IMP: NORMAL
LABORATORY COMMENT REPORT: NORMAL
M PROTEIN SERPL ELPH-MCNC: NORMAL G/DL
PROT SERPL-MCNC: 7.3 G/DL (ref 6–8.5)

## 2021-09-17 ENCOUNTER — LAB (OUTPATIENT)
Dept: LAB | Facility: HOSPITAL | Age: 81
End: 2021-09-17

## 2021-09-17 DIAGNOSIS — D89.2 PARAPROTEINEMIA: ICD-10-CM

## 2021-09-17 PROCEDURE — 83883 ASSAY NEPHELOMETRY NOT SPEC: CPT

## 2021-09-17 PROCEDURE — 81050 URINALYSIS VOLUME MEASURE: CPT

## 2021-09-20 ENCOUNTER — TELEPHONE (OUTPATIENT)
Dept: ONCOLOGY | Facility: HOSPITAL | Age: 81
End: 2021-09-20

## 2021-09-21 LAB
KAPPA LC FREE 24H UR-MRATE: 20.43 MG/24 HR
KAPPA LC UR-MCNC: 9.73 MG/L (ref 0.63–113.79)
KAPPA LC/LAMBDA UR: 5.38 {RATIO} (ref 1.03–31.76)
LAMBDA LC FREE 24H UR-MRATE: 3.8 MG/24 HR
LAMBDA LC UR-MCNC: 1.81 MG/L (ref 0.47–11.77)

## 2021-09-22 ENCOUNTER — APPOINTMENT (OUTPATIENT)
Dept: ONCOLOGY | Facility: HOSPITAL | Age: 81
End: 2021-09-22

## 2021-09-22 ENCOUNTER — PREP FOR SURGERY (OUTPATIENT)
Dept: OTHER | Facility: HOSPITAL | Age: 81
End: 2021-09-22

## 2021-09-22 DIAGNOSIS — A04.8 H. PYLORI INFECTION: Primary | ICD-10-CM

## 2021-09-24 ENCOUNTER — OFFICE VISIT (OUTPATIENT)
Dept: ONCOLOGY | Facility: HOSPITAL | Age: 81
End: 2021-09-24

## 2021-09-24 VITALS
OXYGEN SATURATION: 98 % | SYSTOLIC BLOOD PRESSURE: 165 MMHG | HEART RATE: 79 BPM | RESPIRATION RATE: 16 BRPM | TEMPERATURE: 97.8 F | DIASTOLIC BLOOD PRESSURE: 71 MMHG | WEIGHT: 127.65 LBS | BODY MASS INDEX: 24.93 KG/M2

## 2021-09-24 DIAGNOSIS — D64.9 ANEMIA, UNSPECIFIED TYPE: Primary | ICD-10-CM

## 2021-09-24 PROCEDURE — G0463 HOSPITAL OUTPT CLINIC VISIT: HCPCS | Performed by: NURSE PRACTITIONER

## 2021-09-24 PROCEDURE — 99213 OFFICE O/P EST LOW 20 MIN: CPT | Performed by: NURSE PRACTITIONER

## 2021-09-24 NOTE — PROGRESS NOTES
Chief Complaint  Breast Cancer (-fu)    Azalea Abdullahi, A*  Azalea Abdullahi., APRN      Subjective          Cristiane Bishop presents to [facility] for anemia.     History of Present Illness     Ms. Cristiane Bishop presents for 3 month follow up for anemia. History of stage 1 hormone positive breast cancer. Completed right mastectomy. Recent left mammogram is benign. She completed 5 years of endocrine therapy with Letrozole and was discontinued at the last visit. She also discontinued the use of the every 6 month Prolia injections as well. She feels well overall. She returns today for follow up for the anemia. Was noted to have mild anemia at the last visit with hemoglobin of 11.3. Now improved. She had an EGD and did show H-pylori infection and reports she has follow up with Dr. Damon on Monday, 9/ 27. Denies any GI blood loss, weight loss or change in bowel character.     Cancer Staging  Breast cancer in female (CMS/HCC)  Staging form: Breast, AJCC 8th Edition  - Clinical: Stage IA (cT1, cN0, cM0, G1, ER+, NM+, HER2-) - Signed by Tate Humphries MD on 6/18/2021       Treatment intent: curative    Oncology/Hematology History Overview Note   HEME/ONC DX/RX/INVESTIGATIONS  DX:   R breast ca, stage I, ER/NM positive, HER2 negative, grade I (low risk).    Anemia undergoing medical evaluation. As of 6/18/2021, patient is anticipating GI consultation in the near future.     Hyperglobulinemia, hyponatremia.     RX:   Femara, started in 6/2015. DC on 6/15/2021.  Prolia Q6m. On 6/18/20201, patient declined to continue Prolia.    INVESTIGATIONS:   5/28/2015, pathology report, right mastectomy, invasive ductal carcinoma, greatest dimension 7 mm, grade 1, 3 sentinel lymph nodes negative, ER/NM positive, HER-2 negative.    8/28/2020, L Mammogram, OTONIEL.    8/28/2020, DEXA, CONCLUSION: Normal bone density lumbar spine. Osteopenia in the hips.Bone density in the femoral necks  has decreased by 1.9 percent compared to 2018.      Breast cancer in female (CMS/HCC)   5/8/2015 Initial Diagnosis    Breast cancer in female (CMS/[facility])     6/18/2021 - 12/16/2021 Chemotherapy    OP SUPPORTIVE Denosumab (Prolia) Q6M     6/18/2021 Cancer Staged    Staging form: Breast, AJCC 8th Edition  - Clinical: Stage IA (cT1, cN0, cM0, G1, ER+, TX+, HER2-) - Signed by Tate Humphries MD on 6/18/2021     Breast cancer (CMS/[facility]) (Resolved)   6/17/2021 Initial Diagnosis    Breast cancer (CMS/[facility])     6/18/2021 Cancer Staged    Staging form: Breast, AJCC 8th Edition  - Clinical: Stage IA (cT1, cN0, cM0, G1, ER+, TX+, HER2-) - Signed by Taet Humphries MD on 6/18/2021         Review of Systems   Constitutional: Negative for appetite change, diaphoresis, fatigue, fever, unexpected weight gain and unexpected weight loss.   HENT: Negative for hearing loss, mouth sores, sore throat, swollen glands, trouble swallowing and voice change.    Eyes: Negative for blurred vision.   Respiratory: Negative for cough, shortness of breath and wheezing.    Cardiovascular: Negative for chest pain and palpitations.   Gastrointestinal: Negative for abdominal pain, blood in stool, constipation, diarrhea, nausea and vomiting.   Endocrine: Negative for cold intolerance and heat intolerance.   Genitourinary: Negative for difficulty urinating, dysuria, frequency, hematuria and urinary incontinence.   Musculoskeletal: Negative for arthralgias, back pain and myalgias.   Skin: Negative for rash, skin lesions and bruise.   Neurological: Negative for dizziness, seizures, weakness, numbness and headache.   Hematological: Does not bruise/bleed easily.   Psychiatric/Behavioral: Negative for depressed mood. The patient is not nervous/anxious.        Current Outpatient Medications on File Prior to Visit   Medication Sig Dispense Refill   • Calcium Carb-Cholecalciferol (Calcium 600+D3) 600-200 MG-UNIT tablet Take  by mouth Daily.     • docusate sodium  (Stool Softener) 100 MG capsule Take 100 mg by mouth Daily.     • ferrous sulfate 325 (65 FE) MG tablet Take 325 mg by mouth Daily With Breakfast.     • lisinopril (PRINIVIL,ZESTRIL) 10 MG tablet TAKE 1 TABLET (10MG) BY MOUTH ONCE DAILY 90 tablet 1   • loratadine (CLARITIN) 10 MG tablet loratadine 10 mg oral tablet take 1 tablet (10 mg) by oral route once daily   Active     • omeprazole (priLOSEC) 40 MG capsule Take 1 capsule by mouth Daily for 90 days. 90 capsule 0   • PARoxetine (PAXIL) 20 MG tablet Take 1 tablet by mouth Every Morning. 90 tablet 1     No current facility-administered medications on file prior to visit.       Allergies   Allergen Reactions   • Penicillins Rash   • Sulfa Antibiotics Rash   • Tetracycline Nausea Only and Rash     Difficulty swallowing       Past Medical History:   Diagnosis Date   • Allergic rhinitis    • Breast cancer (CMS/HCC)     RIGHT   • GERD (gastroesophageal reflux disease)    • Heel pain    • Heel spur    • HTN (hypertension)    • Hypertension    • Ingrown toenail    • Mood disorder (CMS/HCC)      Past Surgical History:   Procedure Laterality Date   • ABDOMINAL HYSTERECTOMY     • APPENDECTOMY     • BREAST BIOPSY Right    • COLONOSCOPY N/A 8/30/2021    Procedure: COLONOSCOPY;  Surgeon: Norman Damon MD;  Location: [facility] ENDOSCOPY;  Service: Gastroenterology;  Laterality: N/A;  NORMAL COLON   • ENDOSCOPY N/A 8/30/2021    Procedure: ESOPHAGOGASTRODUODENOSCOPY WITH BIOPSY;  Surgeon: Norman Damon MD;  Location: [facility] ENDOSCOPY;  Service: Gastroenterology;  Laterality: N/A;  GASTRITIS/HIATAL HERNIA   • HYSTERECTOMY     • MASTECTOMY     • MASTECTOMY Right 05/2015     Social History     Socioeconomic History   • Marital status:      Spouse name: Not on file   • Number of children: Not on file   • Years of education: Not on file   • Highest education level: Not on file   Tobacco Use   • Smoking status: Never Smoker   • Smokeless tobacco: Never Used   Vaping  Use   • Vaping Use: Never used   Substance and Sexual Activity   • Alcohol use: Never   • Drug use: Never   • Sexual activity: Defer     Family History   Problem Relation Age of Onset   • Lymphoma Mother    • Breast cancer Mother    • Lymphoma Maternal Uncle    • Diabetes Maternal Uncle    • Heart disease Other    • Diabetes Paternal Uncle    • Lymphoma Other    • Stroke Other    • Colon cancer Neg Hx    • Malig Hyperthermia Neg Hx      Immunization History   Administered Date(s) Administered   • COVID-19 (PFIZER) 02/26/2021, 03/19/2021   • Fluad Quad 65+ 10/06/2020   • Hepatitis A 07/10/2018, 04/01/2019   • Influenza, Unspecified 10/26/2017, 10/03/2018   • Pneumococcal Conjugate 13-Valent (PCV13) 10/17/2016   • Pneumococcal Polysaccharide (PPSV23) 03/26/2018   • Shingrix 10/06/2020, 12/05/2020   • Tdap 01/01/2014, 09/15/2014   • Zostavax 01/01/2014       Objective   Physical Exam  Vitals and nursing note reviewed.   Constitutional:       Appearance: Normal appearance. She is normal weight.   HENT:      Head: Normocephalic.      Nose: Nose normal.      Mouth/Throat:      Mouth: Mucous membranes are moist.      Pharynx: Oropharynx is clear.   Eyes:      Conjunctiva/sclera: Conjunctivae normal.      Pupils: Pupils are equal, round, and reactive to light.   Cardiovascular:      Rate and Rhythm: Normal rate.      Pulses: Normal pulses.      Heart sounds: Normal heart sounds. No murmur heard.     Pulmonary:      Effort: Pulmonary effort is normal. No respiratory distress.      Breath sounds: Normal breath sounds.   Abdominal:      General: Bowel sounds are normal. There is no distension.      Palpations: Abdomen is soft.      Tenderness: There is no abdominal tenderness.   Musculoskeletal:         General: Normal range of motion.      Cervical back: Normal range of motion and neck supple.   Skin:     General: Skin is warm and dry.      Capillary Refill: Capillary refill takes less than 2 seconds.   Neurological:       General: No focal deficit present.      Mental Status: She is alert and oriented to person, place, and time.   Psychiatric:         Mood and Affect: Mood normal.         Behavior: Behavior normal.         Thought Content: Thought content normal.         Judgment: Judgment normal.         Vitals:    09/24/21 1243   BP: 165/71   Pulse: 79   Resp: 16   Temp: 97.8 °F (36.6 °C)   SpO2: 98%   Weight: 57.9 kg (127 lb 10.3 oz)   PainSc: 0-No pain     ECOG score: 1         ECOG: (0) Fully Active - Able to Carry On All Pre-disease Performance Without Restriction  Fall Risk Assessment was completed, and patient is at low risk for falls.  PHQ-9 Total Score:         The patient is not  experiencing fatigue. Fatigue score: 0    PT/OT Functional Screening: PT fx screen: No needs identified  Speech Functional Screening: Speech fx screen: No needs identified  Rehab to be ordered: Rehab to be ordered: No needs identified        Result Review :   The following data was reviewed by: ORLIN Mckeon on 09/24/2021:  Lab Results   Component Value Date    HGB 12.2 09/15/2021    HCT 37.6 09/15/2021    MCV 96.4 09/15/2021     09/15/2021    WBC 5.59 09/15/2021    NEUTROABS 3.04 09/15/2021    LYMPHSABS 1.80 09/15/2021    MONOSABS 0.53 09/15/2021    EOSABS 0.16 09/15/2021    BASOSABS 0.03 09/15/2021     Lab Results   Component Value Date    GLUCOSE 157 (H) 09/15/2021    BUN 18 09/15/2021    CREATININE 0.94 09/15/2021     (L) 09/15/2021    K 4.1 09/15/2021    CL 99 09/15/2021    CO2 24.7 09/15/2021    CALCIUM 9.8 09/15/2021    PROTEINTOT 7.4 09/15/2021    ALBUMIN 4.10 09/15/2021    ALBUMIN 3.7 09/15/2021    BILITOT 0.4 09/15/2021    ALKPHOS 114 09/15/2021    AST 25 09/15/2021    ALT 12 09/15/2021          Assessment and Plan    Diagnoses and all orders for this visit:    1. Anemia, unspecified type (Primary)  -     CBC & Differential; Future  -     Iron Profile; Future  -     Ferritin; Future      Anemia improved. Taking  oral iron and tolerating well. Was found to be H-pylori positive and is following with Dr. Damon for treatment.     Will recheck labs in 6 months.     2) Right breast cancer: She will yearly left mammogram in August of 2022.     She was instructed to call with any questions or concerns.     Patient Follow Up: 6 months with NP.     Patient was given instructions and counseling regarding her condition or for health maintenance advice. Please see specific information pulled into the AVS if appropriate.     Collette Mann, APRN    9/24/2021

## 2021-09-27 ENCOUNTER — HOSPITAL ENCOUNTER (OUTPATIENT)
Facility: HOSPITAL | Age: 81
Setting detail: HOSPITAL OUTPATIENT SURGERY
Discharge: HOME | End: 2021-09-27
Attending: INTERNAL MEDICINE | Admitting: INTERNAL MEDICINE

## 2021-09-27 ENCOUNTER — ANESTHESIA EVENT (OUTPATIENT)
Dept: GASTROENTEROLOGY | Facility: HOSPITAL | Age: 81
End: 2021-09-27

## 2021-09-27 ENCOUNTER — ANESTHESIA (OUTPATIENT)
Dept: GASTROENTEROLOGY | Facility: HOSPITAL | Age: 81
End: 2021-09-27

## 2021-09-27 VITALS
BODY MASS INDEX: 24.84 KG/M2 | HEART RATE: 56 BPM | WEIGHT: 126.54 LBS | RESPIRATION RATE: 13 BRPM | DIASTOLIC BLOOD PRESSURE: 79 MMHG | SYSTOLIC BLOOD PRESSURE: 172 MMHG | OXYGEN SATURATION: 100 % | TEMPERATURE: 97.2 F | HEIGHT: 60 IN

## 2021-09-27 DIAGNOSIS — A04.8 H. PYLORI INFECTION: ICD-10-CM

## 2021-09-27 PROCEDURE — 43239 EGD BIOPSY SINGLE/MULTIPLE: CPT | Performed by: INTERNAL MEDICINE

## 2021-09-27 PROCEDURE — 25010000002 PROPOFOL 10 MG/ML EMULSION: Performed by: NURSE ANESTHETIST, CERTIFIED REGISTERED

## 2021-09-27 PROCEDURE — 87176 TISSUE HOMOGENIZATION CULTR: CPT

## 2021-09-27 PROCEDURE — 87081 CULTURE SCREEN ONLY: CPT | Performed by: INTERNAL MEDICINE

## 2021-09-27 PROCEDURE — 87077 CULTURE AEROBIC IDENTIFY: CPT

## 2021-09-27 PROCEDURE — 87186 SC STD MICRODIL/AGAR DIL: CPT

## 2021-09-27 RX ORDER — PROPOFOL 10 MG/ML
VIAL (ML) INTRAVENOUS AS NEEDED
Status: DISCONTINUED | OUTPATIENT
Start: 2021-09-27 | End: 2021-09-27 | Stop reason: SURG

## 2021-09-27 RX ORDER — SODIUM CHLORIDE, SODIUM LACTATE, POTASSIUM CHLORIDE, CALCIUM CHLORIDE 600; 310; 30; 20 MG/100ML; MG/100ML; MG/100ML; MG/100ML
30 INJECTION, SOLUTION INTRAVENOUS CONTINUOUS
Status: DISCONTINUED | OUTPATIENT
Start: 2021-09-27 | End: 2021-09-27 | Stop reason: HOSPADM

## 2021-09-27 RX ORDER — LIDOCAINE HYDROCHLORIDE 20 MG/ML
INJECTION, SOLUTION INFILTRATION; PERINEURAL AS NEEDED
Status: DISCONTINUED | OUTPATIENT
Start: 2021-09-27 | End: 2021-09-27 | Stop reason: SURG

## 2021-09-27 RX ADMIN — PROPOFOL 50 MG: 10 INJECTION, EMULSION INTRAVENOUS at 12:09

## 2021-09-27 RX ADMIN — LIDOCAINE HYDROCHLORIDE 100 MG: 20 INJECTION, SOLUTION INFILTRATION; PERINEURAL at 12:09

## 2021-09-27 RX ADMIN — PROPOFOL 100 MCG/KG/MIN: 10 INJECTION, EMULSION INTRAVENOUS at 12:10

## 2021-09-27 NOTE — ANESTHESIA POSTPROCEDURE EVALUATION
Patient: Cristiane Bishop    Procedure Summary     Date: 09/27/21 Room / Location: [facility] ENDOSCOPY 3 / [facility] ENDOSCOPY    Anesthesia Start: 1208 Anesthesia Stop: 1238    Procedure: ESOPHAGOGASTRODUODENOSCOPY with biopsies (N/A ) Diagnosis:       H. pylori infection      (H. pylori infection [A04.8])    Surgeons: Norman Damon MD Provider: Tremaine Martin MD    Anesthesia Type: general ASA Status: 2          Anesthesia Type: general    Vitals  Vitals Value Taken Time   /69 09/27/21 1243   Temp 36.1 °C (97 °F) 09/27/21 1232   Pulse 59 09/27/21 1243   Resp 19 09/27/21 1240   SpO2 100 % 09/27/21 1243   Vitals shown include unvalidated device data.        Post Anesthesia Care and Evaluation    Patient location during evaluation: bedside  Patient participation: complete - patient participated  Level of consciousness: awake  Pain score: 0  Pain management: adequate  Airway patency: patent  Anesthetic complications: No anesthetic complications  PONV Status: none  Cardiovascular status: acceptable and stable  Respiratory status: acceptable and room air  Hydration status: acceptable    Comments: An Anesthesiologist personally participated in the most demanding procedures (including induction and emergence if applicable) in the anesthesia plan, monitored the course of anesthesia administration at frequent intervals and remained physically present and available for immediate diagnosis and treatment of emergencies.

## 2021-09-27 NOTE — ANESTHESIA PREPROCEDURE EVALUATION
Anesthesia Evaluation     Patient summary reviewed and Nursing notes reviewed   no history of anesthetic complications:  NPO Solid Status: > 8 hours  NPO Liquid Status: > 2 hours           Airway   Mallampati: II  TM distance: >3 FB  Neck ROM: full  No difficulty expected  Dental      Pulmonary - negative pulmonary ROS and normal exam    breath sounds clear to auscultation  Cardiovascular - normal exam  Exercise tolerance: good (4-7 METS)    Rhythm: regular    (+) hypertension,       Neuro/Psych  (+) psychiatric history,     GI/Hepatic/Renal/Endo    (+)  GERD,      Musculoskeletal (-) negative ROS    Abdominal    Substance History - negative use     OB/GYN negative ob/gyn ROS         Other      history of cancer                    Anesthesia Plan    ASA 2     general   (Total IV Anesthesia    Patient understands anesthesia not responsible for dental damage.  )  intravenous induction     Anesthetic plan, all risks, benefits, and alternatives have been provided, discussed and informed consent has been obtained with: patient.    Plan discussed with CRNA.

## 2021-10-11 ENCOUNTER — TELEPHONE (OUTPATIENT)
Dept: GASTROENTEROLOGY | Facility: CLINIC | Age: 81
End: 2021-10-11

## 2021-10-11 LAB — REF LAB TEST RESULTS: NORMAL

## 2021-10-13 RX ORDER — OMEPRAZOLE 40 MG/1
CAPSULE, DELAYED RELEASE ORAL
Qty: 90 CAPSULE | Refills: 0 | Status: SHIPPED | OUTPATIENT
Start: 2021-10-13 | End: 2021-10-22 | Stop reason: SDUPTHER

## 2021-10-14 ENCOUNTER — TELEPHONE (OUTPATIENT)
Dept: GASTROENTEROLOGY | Facility: CLINIC | Age: 81
End: 2021-10-14

## 2021-10-14 RX ORDER — BISMUTH SUBSALICYLATE 262 MG
524 TABLET,CHEWABLE ORAL 4 TIMES DAILY
Qty: 112 TABLET | Refills: 0 | Status: SHIPPED | OUTPATIENT
Start: 2021-10-14 | End: 2021-10-28

## 2021-10-14 RX ORDER — CLARITHROMYCIN 500 MG/1
500 TABLET, COATED ORAL 2 TIMES DAILY
Qty: 28 TABLET | Refills: 0 | Status: SHIPPED | OUTPATIENT
Start: 2021-10-14 | End: 2021-10-28

## 2021-10-14 RX ORDER — LEVOFLOXACIN 500 MG/1
500 TABLET, FILM COATED ORAL DAILY
Qty: 14 TABLET | Refills: 0 | Status: SHIPPED | OUTPATIENT
Start: 2021-10-14 | End: 2021-10-28

## 2021-10-14 NOTE — TELEPHONE ENCOUNTER
R/w Chelle Barber - she advised that she would send in medication. Pt aware. She will call if she has trouble obtaining from pharmacy.

## 2021-10-14 NOTE — TELEPHONE ENCOUNTER
Please advise pt to hold Iron and Calcium while taking antibiotics, and pt needs to be on Omeprazole with antibiotics, it is the PPI on her list. Any PPI is fine w antibiotics, just wanted to make sure pt had enough.   (sent in Levaquin 500 mg/d, clarithromycin 500 twice daily, bismuth, for 14 days)

## 2021-10-22 ENCOUNTER — OFFICE VISIT (OUTPATIENT)
Dept: FAMILY MEDICINE CLINIC | Facility: CLINIC | Age: 81
End: 2021-10-22

## 2021-10-22 VITALS
TEMPERATURE: 96.9 F | HEART RATE: 96 BPM | BODY MASS INDEX: 24.8 KG/M2 | WEIGHT: 127 LBS | OXYGEN SATURATION: 99 % | DIASTOLIC BLOOD PRESSURE: 96 MMHG | SYSTOLIC BLOOD PRESSURE: 184 MMHG

## 2021-10-22 DIAGNOSIS — K21.9 GASTROESOPHAGEAL REFLUX DISEASE, UNSPECIFIED WHETHER ESOPHAGITIS PRESENT: ICD-10-CM

## 2021-10-22 DIAGNOSIS — A04.8 HELICOBACTER PYLORI (H. PYLORI) INFECTION: Primary | ICD-10-CM

## 2021-10-22 DIAGNOSIS — I10 ESSENTIAL HYPERTENSION: ICD-10-CM

## 2021-10-22 PROCEDURE — 99214 OFFICE O/P EST MOD 30 MIN: CPT | Performed by: NURSE PRACTITIONER

## 2021-10-22 RX ORDER — OMEPRAZOLE 40 MG/1
40 CAPSULE, DELAYED RELEASE ORAL DAILY
Qty: 90 CAPSULE | Refills: 0 | Status: SHIPPED | OUTPATIENT
Start: 2021-10-22 | End: 2022-01-17 | Stop reason: HOSPADM

## 2021-10-22 NOTE — PROGRESS NOTES
Chief Complaint  Heartburn (refill)    Subjective            Cristiane Bishop presents to [facility]  History of Present Illness     She is here today requesting a refill of omeprazole.  This was prescribed to her by gastroenterology; however, it was sent to [facility] and she is no longer going there.  [facility]    She is being treated for H. pylori that has been refractory to treatment.  Her most recent EGD pathology was sent off to [facility] due to she is allergic to penicillin.  They are currently treating her with Levaquin and clarithromycin and Pepto-Bismol.  She has a follow-up with gastro in December.  H. pylori stool antigen has been ordered for her to complete after completion of her antibiotics.    She currently denies any dysphagia.  Denies nausea, vomiting, abdominal pain, constipation, or diarrhea.  She does not feel the greatest taking both antibiotics, but she is hoping that it will get rid of it.    Uncontrolled hypertension.  Her blood pressure was elevated x2 on exam today.  The first time it was 184/96.  Second blood pressure check was 180/100.  She reports that she last took her medication last night at bedtime.  She is prescribed lisinopril 10 mg daily.  No chest pain, palpitations, headaches, dizziness, shortness of breath, or swelling in her legs.  She states that she feels fine.  Further review of her chart reveals her blood pressure has been elevated while being seen by gastroenterology.  Blood pressure in September was 176/79 and 172/79.  She believes she has some degree of whitecoat hypertension.      Past Medical History:   Diagnosis Date   • Allergic rhinitis    • Breast cancer (HCC)     RIGHT   • GERD (gastroesophageal reflux disease)    • Heel pain    • Heel spur    • HTN (hypertension)    • Hypertension    • Ingrown toenail    • Mood disorder (HCC)        Allergies   Allergen Reactions   • Penicillins  Rash   • Sulfa Antibiotics Rash   • Tetracycline Nausea Only and Rash     Difficulty swallowing          Past Surgical History:   Procedure Laterality Date   • ABDOMINAL HYSTERECTOMY     • APPENDECTOMY     • BREAST BIOPSY Right    • COLONOSCOPY N/A 8/30/2021    Procedure: COLONOSCOPY;  Surgeon: Norman Damon MD;  Location: [facility] ENDOSCOPY;  Service: Gastroenterology;  Laterality: N/A;  NORMAL COLON   • ENDOSCOPY N/A 8/30/2021    Procedure: ESOPHAGOGASTRODUODENOSCOPY WITH BIOPSY;  Surgeon: Norman Damon MD;  Location: [facility] ENDOSCOPY;  Service: Gastroenterology;  Laterality: N/A;  GASTRITIS/HIATAL HERNIA   • ENDOSCOPY N/A 9/27/2021    Procedure: ESOPHAGOGASTRODUODENOSCOPY with biopsies;  Surgeon: Norman Damon MD;  Location: [facility] ENDOSCOPY;  Service: Gastroenterology;  Laterality: N/A;  GASTRITIS, HIATAL HERNIA   • HYSTERECTOMY     • MASTECTOMY     • MASTECTOMY Right 05/2015        Social History     Tobacco Use   • Smoking status: Never Smoker   • Smokeless tobacco: Never Used   Vaping Use   • Vaping Use: Never used   Substance Use Topics   • Alcohol use: Never   • Drug use: Never       Family History   Problem Relation Age of Onset   • Lymphoma Mother    • Breast cancer Mother    • Lymphoma Maternal Uncle    • Diabetes Maternal Uncle    • Heart disease Other    • Diabetes Paternal Uncle    • Lymphoma Other    • Stroke Other    • Colon cancer Neg Hx    • Malig Hyperthermia Neg Hx         Health Maintenance Due   Topic Date Due   • HEPATITIS C SCREENING  Never done   • ANNUAL WELLNESS VISIT  06/04/2021   • INFLUENZA VACCINE  08/01/2021   • COVID-19 Vaccine (3 - Pfizer booster) 09/19/2021        Current Outpatient Medications on File Prior to Visit   Medication Sig   • Bismuth 262 MG chewable tablet Chew 2 tablets 4 (Four) Times a Day for 14 days.   • clarithromycin (BIAXIN) 500 MG tablet Take 1 tablet by mouth 2 (Two) Times a Day for 14 days.   • docusate sodium (Stool Softener) 100 MG  capsule Take 100 mg by mouth Daily.   • levoFLOXacin (Levaquin) 500 MG tablet Take 1 tablet by mouth Daily for 14 days.   • lisinopril (PRINIVIL,ZESTRIL) 10 MG tablet TAKE 1 TABLET (10MG) BY MOUTH ONCE DAILY   • loratadine (CLARITIN) 10 MG tablet loratadine 10 mg oral tablet take 1 tablet (10 mg) by oral route once daily   Active   • PARoxetine (PAXIL) 20 MG tablet Take 1 tablet by mouth Every Morning.   • [DISCONTINUED] omeprazole (priLOSEC) 40 MG capsule TAKE 1 CAPSULE BY MOUTH DAILY.   • Calcium Carb-Cholecalciferol (Calcium 600+D3) 600-200 MG-UNIT tablet Take  by mouth Daily.   • ferrous sulfate 325 (65 FE) MG tablet Take 325 mg by mouth Daily With Breakfast.     No current facility-administered medications on file prior to visit.       Immunization History   Administered Date(s) Administered   • COVID-19 (PFIZER) 02/26/2021, 03/19/2021   • Fluad Quad 65+ 10/06/2020   • Hepatitis A 07/10/2018, 04/01/2019   • Influenza, Unspecified 10/26/2017, 10/03/2018   • Pneumococcal Conjugate 13-Valent (PCV13) 10/17/2016   • Pneumococcal Polysaccharide (PPSV23) 03/26/2018   • Shingrix 10/06/2020, 12/05/2020   • Tdap 01/01/2014, 09/15/2014   • Zostavax 01/01/2014       Review of Systems     Objective     BP (!) 184/96   Pulse 96   Temp 96.9 °F (36.1 °C)   Wt 57.6 kg (127 lb)   SpO2 99%   BMI 24.80 kg/m²       Physical Exam  Vitals reviewed.   Constitutional:       General: She is not in acute distress.     Appearance: Normal appearance. She is well-developed and normal weight.   HENT:      Head: Normocephalic and atraumatic.   Eyes:      General: No scleral icterus.     Conjunctiva/sclera: Conjunctivae normal.   Cardiovascular:      Rate and Rhythm: Normal rate and regular rhythm.      Pulses: Normal pulses.      Heart sounds: No murmur heard.      Pulmonary:      Effort: Pulmonary effort is normal.      Breath sounds: Normal breath sounds. No wheezing or rhonchi.   Musculoskeletal:         General: Normal range of  motion.      Right lower leg: No edema.      Left lower leg: No edema.   Skin:     General: Skin is warm and dry.   Neurological:      Mental Status: She is alert and oriented to person, place, and time.   Psychiatric:         Mood and Affect: Mood and affect normal.         Behavior: Behavior normal.         Thought Content: Thought content normal.         Judgment: Judgment normal.         Result Review :     The following data was reviewed by: ORLIN Denson on 10/22/2021:    CMP    CMP 7/26/21 9/7/21 9/15/21 9/15/21      0942 0942   Glucose 192 (A) 92 157 (A)    BUN 16 18 18    Creatinine 0.88 0.91 0.94    eGFR Non  Am 62 60 (A) 57 (A)    Sodium 132 (A) 134 (A) 135 (A)    Potassium 3.9 4.2 4.1    Chloride 93 (A) 97 (A) 99    Calcium 9.1 9.7 9.8    Total Protein    7.3   Albumin 3.60 4.20 4.10 3.7   Globulin    3.6   Total Bilirubin 0.5 0.3 0.4    Alkaline Phosphatase 80 114 114    AST (SGOT) 47 (A) 20 25    ALT (SGPT) 42 (A) 9 12    (A) Abnormal value            CBC    CBC 6/18/21 7/26/21 9/15/21   WBC 6.50 8.30 5.59   RBC 3.54 (A) 3.80 3.90   Hemoglobin 11.2 (A) 11.9 (A) 12.2   Hematocrit 33.6 (A) 36.2 37.6   MCV 94.9 95.3 96.4   MCH 31.6 31.3 31.3   MCHC 33.3 32.9 32.4   RDW 13.4 12.2 (A) 12.9   Platelets 267 311 289   (A) Abnormal value            H. pylori culture with sensitivities - [facility] - Miscellaneous Test (09/27/2021 12:12)    Data reviewed: GI studies :   UPPER GI ENDOSCOPY (09/27/2021 12:02)         Assessment and Plan      Diagnoses and all orders for this visit:    1. Helicobacter pylori (H. pylori) infection (Primary)  -     omeprazole (priLOSEC) 40 MG capsule; Take 1 capsule by mouth Daily.  Dispense: 90 capsule; Refill: 0    2. Gastroesophageal reflux disease, unspecified whether esophagitis present    3. Essential hypertension  Comments:  Uncontrolled on exam today.  Patient has been advised to increase lisinopril to 10 mg twice daily and follow-up in 2  weeks.            Follow Up     Follow-up in 2 weeks to reassess blood pressure.    Patient was given instructions and counseling regarding her condition or for health maintenance advice. Please see specific information pulled into the AVS if appropriate.     Cristiane Bishop  reports that she has never smoked. She has never used smokeless tobacco.

## 2021-10-25 ENCOUNTER — TELEPHONE (OUTPATIENT)
Dept: GASTROENTEROLOGY | Facility: CLINIC | Age: 81
End: 2021-10-25

## 2021-10-25 NOTE — TELEPHONE ENCOUNTER
S/w pt. She states she has never had thrush before, so she's not sure if that is what it is. The best way she can describe it is that her tongue has a burning sensations and is also stinging. She said she doesn't notice that her tongue looks different.

## 2021-10-25 NOTE — TELEPHONE ENCOUNTER
Pt called to report side effects from her H. Pylori treatment. She states her tongue is bothering her. She is able to continue the treatment but it asking for something to help with this discomfort. No other sx to report per pt.      Pharmacy: [facility]  Phone: [phone]

## 2021-10-26 NOTE — TELEPHONE ENCOUNTER
Please notify pt Dr Damon wanted to know how many days pt has taken of antibiotics? Does tongue appear white or look different today?

## 2021-10-28 ENCOUNTER — OFFICE VISIT (OUTPATIENT)
Dept: FAMILY MEDICINE CLINIC | Facility: CLINIC | Age: 81
End: 2021-10-28

## 2021-10-28 VITALS
BODY MASS INDEX: 24.8 KG/M2 | DIASTOLIC BLOOD PRESSURE: 78 MMHG | TEMPERATURE: 96.5 F | WEIGHT: 127 LBS | OXYGEN SATURATION: 98 % | SYSTOLIC BLOOD PRESSURE: 148 MMHG | HEART RATE: 80 BPM

## 2021-10-28 DIAGNOSIS — B37.0 THRUSH: ICD-10-CM

## 2021-10-28 DIAGNOSIS — J02.9 PHARYNGITIS, UNSPECIFIED ETIOLOGY: ICD-10-CM

## 2021-10-28 DIAGNOSIS — K14.0 GLOSSITIS: Primary | ICD-10-CM

## 2021-10-28 LAB
EXPIRATION DATE: NORMAL
INTERNAL CONTROL: NORMAL
Lab: NORMAL
S PYO AG THROAT QL: NEGATIVE

## 2021-10-28 PROCEDURE — 87880 STREP A ASSAY W/OPTIC: CPT | Performed by: FAMILY MEDICINE

## 2021-10-28 PROCEDURE — 99213 OFFICE O/P EST LOW 20 MIN: CPT | Performed by: FAMILY MEDICINE

## 2021-11-05 ENCOUNTER — OFFICE VISIT (OUTPATIENT)
Dept: FAMILY MEDICINE CLINIC | Facility: CLINIC | Age: 81
End: 2021-11-05

## 2021-11-05 VITALS
WEIGHT: 125 LBS | SYSTOLIC BLOOD PRESSURE: 150 MMHG | HEART RATE: 81 BPM | BODY MASS INDEX: 24.41 KG/M2 | DIASTOLIC BLOOD PRESSURE: 90 MMHG | TEMPERATURE: 96.6 F | OXYGEN SATURATION: 97 %

## 2021-11-05 DIAGNOSIS — Z23 IMMUNIZATION DUE: ICD-10-CM

## 2021-11-05 DIAGNOSIS — Z23 NEED FOR INFLUENZA VACCINATION: ICD-10-CM

## 2021-11-05 DIAGNOSIS — B37.0 THRUSH: ICD-10-CM

## 2021-11-05 DIAGNOSIS — I10 ESSENTIAL HYPERTENSION: Primary | ICD-10-CM

## 2021-11-05 PROCEDURE — 91300 COVID-19 (PFIZER): CPT | Performed by: NURSE PRACTITIONER

## 2021-11-05 PROCEDURE — 99214 OFFICE O/P EST MOD 30 MIN: CPT | Performed by: NURSE PRACTITIONER

## 2021-11-05 PROCEDURE — G0008 ADMIN INFLUENZA VIRUS VAC: HCPCS | Performed by: NURSE PRACTITIONER

## 2021-11-05 PROCEDURE — 0004A COVID-19 (PFIZER): CPT | Performed by: NURSE PRACTITIONER

## 2021-11-05 PROCEDURE — 90662 IIV NO PRSV INCREASED AG IM: CPT | Performed by: NURSE PRACTITIONER

## 2021-11-05 NOTE — PROGRESS NOTES
Chief Complaint  Hypertension    Subjective            Cristiane Bishop presents to [facility]  History of Present Illness     Follow-up on hypertension.  Last week we increased her lisinopril to 10 mg twice daily.  Her blood pressure has reduced some, but is still elevated at 150/90.  She continues to deny any symptoms of hypertension.  Denies any chest pain or palpitations.  She denies headache, dizziness, lightheadedness, syncope, shortness of breath, swelling in her legs.  She states that she feels fine overall.  She does report that both of her grandparents are  from stroke.    She requests a refill of Nystatin for thrush.     She would also like to get her flu shot and her COVID-19 booster while she is here today.      Past Medical History:   Diagnosis Date   • Allergic rhinitis    • Breast cancer (HCC)     RIGHT   • GERD (gastroesophageal reflux disease)    • Heel pain    • Heel spur    • HTN (hypertension)    • Hypertension    • Ingrown toenail    • Mood disorder (HCC)        Allergies   Allergen Reactions   • Penicillins Rash   • Sulfa Antibiotics Rash   • Tetracycline Nausea Only and Rash     Difficulty swallowing          Past Surgical History:   Procedure Laterality Date   • ABDOMINAL HYSTERECTOMY     • APPENDECTOMY     • BREAST BIOPSY Right    • COLONOSCOPY N/A 2021    Procedure: COLONOSCOPY;  Surgeon: Norman Damon MD;  Location: [facility] ENDOSCOPY;  Service: Gastroenterology;  Laterality: N/A;  NORMAL COLON   • ENDOSCOPY N/A 2021    Procedure: ESOPHAGOGASTRODUODENOSCOPY WITH BIOPSY;  Surgeon: Norman Damon MD;  Location: [facility] ENDOSCOPY;  Service: Gastroenterology;  Laterality: N/A;  GASTRITIS/HIATAL HERNIA   • ENDOSCOPY N/A 2021    Procedure: ESOPHAGOGASTRODUODENOSCOPY with biopsies;  Surgeon: Norman Damon MD;  Location: [facility] ENDOSCOPY;  Service: Gastroenterology;  Laterality: N/A;  GASTRITIS, HIATAL HERNIA   •  HYSTERECTOMY     • MASTECTOMY     • MASTECTOMY Right 05/2015        Social History     Tobacco Use   • Smoking status: Never Smoker   • Smokeless tobacco: Never Used   Vaping Use   • Vaping Use: Never used   Substance Use Topics   • Alcohol use: Never   • Drug use: Never       Family History   Problem Relation Age of Onset   • Lymphoma Mother    • Breast cancer Mother    • Lymphoma Maternal Uncle    • Diabetes Maternal Uncle    • Heart disease Other    • Diabetes Paternal Uncle    • Lymphoma Other    • Stroke Other    • Colon cancer Neg Hx    • Malig Hyperthermia Neg Hx         Health Maintenance Due   Topic Date Due   • HEPATITIS C SCREENING  Never done   • ANNUAL WELLNESS VISIT  06/04/2021        Current Outpatient Medications on File Prior to Visit   Medication Sig   • Calcium Carb-Cholecalciferol (Calcium 600+D3) 600-200 MG-UNIT tablet Take  by mouth Daily.   • docusate sodium (Stool Softener) 100 MG capsule Take 100 mg by mouth Daily.   • ferrous sulfate 325 (65 FE) MG tablet Take 325 mg by mouth Daily With Breakfast.   • lisinopril (PRINIVIL,ZESTRIL) 10 MG tablet TAKE 1 TABLET (10MG) BY MOUTH ONCE DAILY   • loratadine (CLARITIN) 10 MG tablet loratadine 10 mg oral tablet take 1 tablet (10 mg) by oral route once daily   Active   • omeprazole (priLOSEC) 40 MG capsule Take 1 capsule by mouth Daily.   • PARoxetine (PAXIL) 20 MG tablet Take 1 tablet by mouth Every Morning.   • [DISCONTINUED] nystatin (MYCOSTATIN) 100,000 unit/mL suspension Swish and swallow 5 mL 4 (Four) Times a Day.     No current facility-administered medications on file prior to visit.       Immunization History   Administered Date(s) Administered   • COVID-19 (PFIZER) 02/26/2021, 03/19/2021, 11/05/2021   • Fluad Quad 65+ 10/06/2020   • Fluzone High-Dose 65+yrs 11/05/2021   • Hepatitis A 07/10/2018, 04/01/2019   • Influenza, Unspecified 10/26/2017, 10/03/2018   • Pneumococcal Conjugate 13-Valent (PCV13) 10/17/2016   • Pneumococcal Polysaccharide  (PPSV23) 03/26/2018   • Shingrix 10/06/2020, 12/05/2020   • Tdap 01/01/2014, 09/15/2014   • Zostavax 01/01/2014       Review of Systems     Objective     /90   Pulse 81   Temp 96.6 °F (35.9 °C)   Wt 56.7 kg (125 lb)   SpO2 97%   BMI 24.41 kg/m²       Physical Exam  Vitals reviewed.   Constitutional:       General: She is not in acute distress.     Appearance: Normal appearance. She is well-developed and normal weight.   HENT:      Head: Normocephalic and atraumatic.   Eyes:      General: No scleral icterus.     Conjunctiva/sclera: Conjunctivae normal.   Cardiovascular:      Rate and Rhythm: Normal rate and regular rhythm.      Pulses: Normal pulses.      Heart sounds: No murmur heard.      Pulmonary:      Effort: Pulmonary effort is normal.      Breath sounds: Normal breath sounds. No wheezing or rhonchi.   Musculoskeletal:         General: Normal range of motion.      Right lower leg: No edema.      Left lower leg: No edema.   Skin:     General: Skin is warm and dry.   Neurological:      Mental Status: She is alert and oriented to person, place, and time.   Psychiatric:         Mood and Affect: Mood and affect normal.         Behavior: Behavior normal.         Thought Content: Thought content normal.         Judgment: Judgment normal.         Result Review :     The following data was reviewed by: ORLIN Denson on 11/05/2021:    CMP    CMP 7/26/21 9/7/21 9/15/21 9/15/21      0942 0942   Glucose 192 (A) 92 157 (A)    BUN 16 18 18    Creatinine 0.88 0.91 0.94    eGFR Non  Am 62 60 (A) 57 (A)    Sodium 132 (A) 134 (A) 135 (A)    Potassium 3.9 4.2 4.1    Chloride 93 (A) 97 (A) 99    Calcium 9.1 9.7 9.8    Total Protein    7.3   Albumin 3.60 4.20 4.10 3.7   Globulin    3.6   Total Bilirubin 0.5 0.3 0.4    Alkaline Phosphatase 80 114 114    AST (SGOT) 47 (A) 20 25    ALT (SGPT) 42 (A) 9 12    (A) Abnormal value            CBC    CBC 6/18/21 7/26/21 9/15/21   WBC 6.50 8.30 5.59   RBC 3.54 (A)  3.80 3.90   Hemoglobin 11.2 (A) 11.9 (A) 12.2   Hematocrit 33.6 (A) 36.2 37.6   MCV 94.9 95.3 96.4   MCH 31.6 31.3 31.3   MCHC 33.3 32.9 32.4   RDW 13.4 12.2 (A) 12.9   Platelets 267 311 289   (A) Abnormal value            TSH    TSH 7/26/21   TSH 2.940                     Assessment and Plan      Diagnoses and all orders for this visit:    1. Essential hypertension (Primary)  Comments:  Increase lisinopril to 20mg BID and follow up in 1 week - will recheck BP at that visit and complete MAW exam.     2. Thrush  -     nystatin (MYCOSTATIN) 100,000 unit/mL suspension; Swish and swallow 5 mL 4 (Four) Times a Day.  Dispense: 140 mL; Refill: 0    3. Need for influenza vaccination  -     Fluzone High-Dose 65+yrs (1958-2003)    4. Immunization due  -     COVID-19 Vaccine (Pfizer)            Follow Up     Patient was given instructions and counseling regarding her condition or for health maintenance advice. Please see specific information pulled into the AVS if appropriate.     Cristiane Bishop  reports that she has never smoked. She has never used smokeless tobacco.

## 2021-11-12 NOTE — PROGRESS NOTES
Chief Complaint  Mouth Lesions (Mouth sore from taking antibiotics. Recent Hpylori pos. )    Subjective          Cristiane Bishop presents to [facility]  Pt finished treatment for H. Pylori today- pt done with meds now    Pt has had mouth soreness and glossitis for 10 days- sudden onset- worsening symptoms  Pt recently had vitamin B12 and iron levels checked in last 1 month and all are normal now    ROS: pt has had no recent fevers, soa, cough, vision changes, headaches, chest pains, palpitations, syncope, dizziness, nausea, vomiting, diarrhea, abdominal pain, rashes, joint pain, depression, anxiety, memory problems, leg swelling.        Objective   Allergies   Allergen Reactions   • Penicillins Rash   • Sulfa Antibiotics Rash   • Tetracycline Nausea Only and Rash     Difficulty swallowing       Immunization History   Administered Date(s) Administered   • COVID-19 (PFIZER) 02/26/2021, 03/19/2021   • Fluad Quad 65+ 10/06/2020   • Hepatitis A 07/10/2018, 04/01/2019   • Influenza, Unspecified 10/26/2017, 10/03/2018   • Pneumococcal Conjugate 13-Valent (PCV13) 10/17/2016   • Pneumococcal Polysaccharide (PPSV23) 03/26/2018   • Shingrix 10/06/2020, 12/05/2020   • Tdap 01/01/2014, 09/15/2014   • Zostavax 01/01/2014       Vital Signs:   Vitals:    10/28/21 1440   BP: 148/78   Pulse: 80   Temp: 96.5 °F (35.8 °C)   SpO2: 98%   Weight: 57.6 kg (127 lb)       Physical Exam  Vitals reviewed.   Constitutional:       Appearance: Normal appearance. She is well-developed.   HENT:      Head: Normocephalic and atraumatic.      Right Ear: External ear normal.      Left Ear: External ear normal.      Nose: Nose normal.      Mouth/Throat:      Mouth: Mucous membranes are moist.      Pharynx: Oropharynx is clear. No oropharyngeal exudate or posterior oropharyngeal erythema.      Comments: Red tongue and red gums and erythema of throat, no lesions seen in mouth.  Eyes:      Conjunctiva/sclera:  Conjunctivae normal.      Pupils: Pupils are equal, round, and reactive to light.   Neck:      Thyroid: No thyroid mass or thyromegaly.   Cardiovascular:      Rate and Rhythm: Normal rate and regular rhythm.      Pulses: Normal pulses.      Heart sounds: Normal heart sounds. No murmur heard.  No friction rub. No gallop.    Pulmonary:      Effort: Pulmonary effort is normal.      Breath sounds: Normal breath sounds. No wheezing or rhonchi.   Abdominal:      General: Bowel sounds are normal. There is no distension.      Palpations: Abdomen is soft.      Tenderness: There is no abdominal tenderness.   Musculoskeletal:         General: Normal range of motion.      Cervical back: Normal range of motion and neck supple.   Lymphadenopathy:      Cervical: No cervical adenopathy.   Skin:     General: Skin is warm and dry.      Capillary Refill: Capillary refill takes less than 2 seconds.   Neurological:      General: No focal deficit present.      Mental Status: She is alert and oriented to person, place, and time.      Cranial Nerves: No cranial nerve deficit.   Psychiatric:         Mood and Affect: Mood and affect normal.         Behavior: Behavior normal.         Thought Content: Thought content normal.         Judgment: Judgment normal.        Result Review :                 Assessment and Plan    Diagnoses and all orders for this visit:    1. Glossitis (Primary)    2. Thrush  -     nystatin (MYCOSTATIN) 100,000 unit/mL suspension; Swish and swallow 5 mL 4 (Four) Times a Day.  Dispense: 140 mL; Refill: 0    3. Pharyngitis, unspecified etiology  -     POCT rapid strep A            Follow Up   Return in about 1 week (around 11/4/2021), or if symptoms worsen or fail to improve.  Patient was given instructions and counseling regarding her condition or for health maintenance advice. Please see specific information pulled into the AVS if appropriate.        1-2 cups/cans per day

## 2021-11-15 ENCOUNTER — OFFICE VISIT (OUTPATIENT)
Dept: FAMILY MEDICINE CLINIC | Facility: CLINIC | Age: 81
End: 2021-11-15

## 2021-11-15 VITALS
OXYGEN SATURATION: 98 % | TEMPERATURE: 97.2 F | HEIGHT: 59 IN | HEART RATE: 93 BPM | SYSTOLIC BLOOD PRESSURE: 160 MMHG | BODY MASS INDEX: 25.4 KG/M2 | WEIGHT: 126 LBS | DIASTOLIC BLOOD PRESSURE: 88 MMHG

## 2021-11-15 DIAGNOSIS — Z91.81 AT MODERATE RISK FOR FALL: ICD-10-CM

## 2021-11-15 DIAGNOSIS — Z00.00 MEDICARE ANNUAL WELLNESS VISIT, SUBSEQUENT: Primary | ICD-10-CM

## 2021-11-15 DIAGNOSIS — I10 ESSENTIAL HYPERTENSION: ICD-10-CM

## 2021-11-15 PROCEDURE — G0439 PPPS, SUBSEQ VISIT: HCPCS | Performed by: NURSE PRACTITIONER

## 2021-11-15 PROCEDURE — 1159F MED LIST DOCD IN RCRD: CPT | Performed by: NURSE PRACTITIONER

## 2021-11-15 PROCEDURE — 1170F FXNL STATUS ASSESSED: CPT | Performed by: NURSE PRACTITIONER

## 2021-11-15 PROCEDURE — 1125F AMNT PAIN NOTED PAIN PRSNT: CPT | Performed by: NURSE PRACTITIONER

## 2021-11-15 RX ORDER — LISINOPRIL 20 MG/1
20 TABLET ORAL 2 TIMES DAILY
Qty: 60 TABLET | Refills: 0 | Status: SHIPPED | OUTPATIENT
Start: 2021-11-15 | End: 2021-12-13

## 2021-11-15 NOTE — PROGRESS NOTES
"The ABCs of the Annual Wellness Visit  Subsequent Medicare Wellness Visit    Chief Complaint   Patient presents with   • Medicare Wellness-subsequent      Subjective    History of Present Illness:  Cristiane Bishop is a 81 y.o. female who presents for a Subsequent Medicare Wellness Visit.    The following portions of the patient's history were reviewed and   updated as appropriate: allergies, current medications, past family history, past medical history, past social history, past surgical history and problem list.    Compared to one year ago, the patient feels her physical   health is worse. She doesn't have as much energy as she used to. She used to watch her great grandchildren but they are now in school. She is okay with that. She will still see them on occasion.     Compared to one year ago, the patient feels her mental   health is the same.    Recent Hospitalizations:  She was not admitted to the hospital during the last year.     Her blood pressure continues to be elevated on exam in the office despite increasing lisinopril from 10 mg once daily, now up to 40 mg daily, in divided doses.  She denies any chest pain or palpitations.  She denies headaches, dizziness, shortness of breath, or swelling in her legs.  She states her son, Yfn, lives with her and he has checked her blood pressure and told her it was \"okay\", but she is not certain of the readings.  She states his cough is larger than the cough that we use.  She does have a family history of hypertension in her father, as well as stroke.      Current Medical Providers:  Patient Care Team:  Azalea Abdullahi APRN as PCP - General (Nurse Practitioner)  Tate Humphries MD as Consulting Physician (Hematology and Oncology)  Collette Mann APRN as Nurse Practitioner (Nurse Practitioner)    Outpatient Medications Prior to Visit   Medication Sig Dispense Refill   • docusate sodium (Stool Softener) 100 MG capsule Take 100 mg by mouth Daily.     • " loratadine (CLARITIN) 10 MG tablet loratadine 10 mg oral tablet take 1 tablet (10 mg) by oral route once daily   Active     • nystatin (MYCOSTATIN) 100,000 unit/mL suspension Swish and swallow 5 mL 4 (Four) Times a Day. 140 mL 0   • PARoxetine (PAXIL) 20 MG tablet Take 1 tablet by mouth Every Morning. 90 tablet 1   • lisinopril (PRINIVIL,ZESTRIL) 10 MG tablet TAKE 1 TABLET (10MG) BY MOUTH ONCE DAILY 90 tablet 1   • Calcium Carb-Cholecalciferol (Calcium 600+D3) 600-200 MG-UNIT tablet Take  by mouth Daily.     • ferrous sulfate 325 (65 FE) MG tablet Take 325 mg by mouth Daily With Breakfast.     • omeprazole (priLOSEC) 40 MG capsule Take 1 capsule by mouth Daily. 90 capsule 0     No facility-administered medications prior to visit.       No opioid medication identified on active medication list. I have reviewed chart for other potential  high risk medication/s and harmful drug interactions in the elderly.          Aspirin is not on active medication list.  Aspirin use is indicated based on review of current medical condition/s. Pros and cons of this therapy have been discussed with this patient. Benefits of this medication outweigh potential harm.  Patient has been instructed to start taking this medication..    Patient Active Problem List   Diagnosis   • Breast cancer in female (HCC)   • Heel pain   • Allergic rhinitis   • Esophageal reflux   • Heel spur   • History of breast cancer   • Essential hypertension   • HTN (hypertension)   • Ingrown toenail   • Depression   • Mood disorder (HCC)   • Osteopenia   • Bacterial infection due to H. pylori   • Iron deficiency anemia due to chronic blood loss   • Heartburn     Advance Care Planning  Advance Directive is on file.  ACP discussion was held with the patient during this visit. Patient has an advance directive in EMR which is still valid.           Objective    Vitals:    11/15/21 1112 11/15/21 1128   BP: 170/94 160/88   BP Location:  Left arm   Patient Position:   "Sitting   Cuff Size:  Adult   Pulse: 93    Temp: 97.2 °F (36.2 °C)    SpO2: 98%    Weight: 57.2 kg (126 lb)    Height: 149.9 cm (59\")    PainSc:   2      BMI Readings from Last 1 Encounters:   11/15/21 25.45 kg/m²   BMI is above normal parameters. Recommendations include: exercise counseling and nutrition counseling    Does the patient have evidence of cognitive impairment? No    Physical Exam  Vitals reviewed.   Constitutional:       General: She is not in acute distress.     Appearance: Normal appearance. She is well-developed.   HENT:      Head: Normocephalic and atraumatic.   Eyes:      General: No scleral icterus.     Conjunctiva/sclera: Conjunctivae normal.   Neck:      Thyroid: No thyroid mass, thyromegaly or thyroid tenderness.      Vascular: No carotid bruit.      Trachea: Trachea normal.   Cardiovascular:      Rate and Rhythm: Normal rate and regular rhythm.      Pulses: Normal pulses.      Heart sounds: No murmur heard.      Pulmonary:      Effort: Pulmonary effort is normal.      Breath sounds: Normal breath sounds. No wheezing or rhonchi.   Musculoskeletal:         General: Normal range of motion.      Cervical back: Normal range of motion and neck supple.      Right lower leg: No edema.      Left lower leg: No edema.   Lymphadenopathy:      Cervical: No cervical adenopathy.   Skin:     General: Skin is warm and dry.   Neurological:      Mental Status: She is alert and oriented to person, place, and time.   Psychiatric:         Mood and Affect: Mood and affect normal.         Behavior: Behavior normal.         Thought Content: Thought content normal.         Judgment: Judgment normal.               HEALTH RISK ASSESSMENT    Smoking Status:  Social History     Tobacco Use   Smoking Status Never Smoker   Smokeless Tobacco Never Used     Alcohol Consumption:  Social History     Substance and Sexual Activity   Alcohol Use Never     Fall Risk Screen:    STEADI Fall Risk Assessment was completed, and patient " is at MODERATE risk for falls. Assessment completed on:11/15/2021    Depression Screening:  PHQ-2/PHQ-9 Depression Screening 11/15/2021   Little interest or pleasure in doing things 0   Feeling down, depressed, or hopeless 0   Total Score 0       Health Habits and Functional and Cognitive Screening:  Functional & Cognitive Status 11/15/2021   Do you have difficulty preparing food and eating? No   Do you have difficulty bathing yourself, getting dressed or grooming yourself? No   Do you have difficulty using the toilet? No   Do you have difficulty moving around from place to place? No   Do you have trouble with steps or getting out of a bed or a chair? No   Current Diet Well Balanced Diet   Dental Exam Up to date   Eye Exam Up to date   Exercise (times per week) Other   Do you need help using the phone?  No   Are you deaf or do you have serious difficulty hearing?  Yes   Do you need help with transportation? No   Do you need help shopping? No   Do you need help preparing meals?  No   Do you need help with housework?  No   Do you need help with laundry? No   Do you need help taking your medications? No   Do you need help managing money? No   Do you ever drive or ride in a car without wearing a seat belt? No       Age-appropriate Screening Schedule:  Refer to the list below for future screening recommendations based on patient's age, sex and/or medical conditions. Orders for these recommended tests are listed in the plan section. The patient has been provided with a written plan.    Health Maintenance   Topic Date Due   • DXA SCAN  08/28/2022   • MAMMOGRAM  09/09/2022   • TDAP/TD VACCINES (3 - Td or Tdap) 09/15/2024   • INFLUENZA VACCINE  Completed   • ZOSTER VACCINE  Completed              Assessment/Plan   CMS Preventative Services Quick Reference  Risk Factors Identified During Encounter  Cardiovascular Disease  Fall Risk-High or Moderate  Glaucoma or Family History of Glaucoma  Hearing Problem  The above  risks/problems have been discussed with the patient.  Follow up actions/plans if indicated are seen below in the Assessment/Plan Section.  Pertinent information has been shared with the patient in the After Visit Summary.    Diagnoses and all orders for this visit:    1. Medicare annual wellness visit, subsequent (Primary)    2. At moderate risk for fall    3. Essential hypertension  -     lisinopril (PRINIVIL,ZESTRIL) 20 MG tablet; Take 1 tablet by mouth 2 (Two) Times a Day.  Dispense: 60 tablet; Refill: 0        Follow Up:   Return in about 1 month (around 12/15/2021) for Recheck.     For now, she is going to continue lisinopril 20 mg twice daily.  She is going to have her blood pressure checked a few times over the next week and call me with readings.  We discussed the possibility of whitecoat syndrome, as her blood pressure has essentially remained unchanged in the office despite increasing her medication.  Additionally, we have discussed the importance for treatment of hypertension should this not be white coat syndrome, as she does have a family history of hypertension and stroke in her family.  She is going to initiate a baby aspirin daily.    Encouraged to get annual eye exam.  She does have some hearing loss, but she does not wish to be further evaluated for this at this time.    We discussed falls risk and falls education.    She is up-to-date on all vaccinations and screening tests with the exception of hepatitis C screening.  We will get this with her next lab draw.    An After Visit Summary and PPPS were made available to the patient.

## 2021-11-15 NOTE — PATIENT INSTRUCTIONS
Fall Prevention in the Home, Adult  Falls can cause injuries and can affect people from all age groups. There are many simple things that you can do to make your home safe and to help prevent falls. Ask for help when making these changes, if needed.  What actions can I take to prevent falls?  General instructions  · Use good lighting in all rooms. Replace any light bulbs that burn out.  · Turn on lights if it is dark. Use night-lights.  · Place frequently used items in easy-to-reach places. Lower the shelves around your home if necessary.  · Set up furniture so that there are clear paths around it. Avoid moving your furniture around.  · Remove throw rugs and other tripping hazards from the floor.  · Avoid walking on wet floors.  · Fix any uneven floor surfaces.  · Add color or contrast paint or tape to grab bars and handrails in your home. Place contrasting color strips on the first and last steps of stairways.  · When you use a stepladder, make sure that it is completely opened and that the sides are firmly locked. Have someone hold the ladder while you are using it. Do not climb a closed stepladder.  · Be aware of any and all pets.  What can I do in the bathroom?         · Keep the floor dry. Immediately clean up any water that spills onto the floor.  · Remove soap buildup in the tub or shower on a regular basis.  · Use non-skid mats or decals on the floor of the tub or shower.  · Attach bath mats securely with double-sided, non-slip rug tape.  · If you need to sit down while you are in the shower, use a plastic, non-slip stool.  · Install grab bars by the toilet and in the tub and shower. Do not use towel bars as grab bars.  What can I do in the bedroom?  · Make sure that a bedside light is easy to reach.  · Do not use oversized bedding that drapes onto the floor.  · Have a firm chair that has side arms to use for getting dressed.  What can I do in the kitchen?  · Clean up any spills right away.  · If you  need to reach for something above you, use a sturdy step stool that has a grab bar.  · Keep electrical cables out of the way.  · Do not use floor polish or wax that makes floors slippery. If you must use wax, make sure that it is non-skid floor wax.  What can I do in the stairways?  · Do not leave any items on the stairs.  · Make sure that you have a light switch at the top of the stairs and the bottom of the stairs. Have them installed if you do not have them.  · Make sure that there are handrails on both sides of the stairs. Fix handrails that are broken or loose. Make sure that handrails are as long as the stairways.  · Install non-slip stair treads on all stairs in your home.  · Avoid having throw rugs at the top or bottom of stairways, or secure the rugs with carpet tape to prevent them from moving.  · Choose a carpet design that does not hide the edge of steps on the stairway.  · Check any carpeting to make sure that it is firmly attached to the stairs. Fix any carpet that is loose or worn.  What can I do on the outside of my home?  · Use bright outdoor lighting.  · Regularly repair the edges of walkways and driveways and fix any cracks.  · Remove high doorway thresholds.  · Trim any shrubbery on the main path into your home.  · Regularly check that handrails are securely fastened and in good repair. Both sides of any steps should have handrails.  · Install guardrails along the edges of any raised decks or porches.  · Clear walkways of debris and clutter, including tools and rocks.  · Have leaves, snow, and ice cleared regularly.  · Use sand or salt on walkways during winter months.  · In the garage, clean up any spills right away, including grease or oil spills.  What other actions can I take?  · Wear closed-toe shoes that fit well and support your feet. Wear shoes that have rubber soles or low heels.  · Use mobility aids as needed, such as canes, walkers, scooters, and crutches.  · Review your medicines with  your health care provider. Some medicines can cause dizziness or changes in blood pressure, which increase your risk of falling.  Talk with your health care provider about other ways that you can decrease your risk of falls. This may include working with a physical therapist or  to improve your strength, balance, and endurance.  Where to find more information  · Centers for Disease Control and Prevention, [place]: https://www.cdc.gov  · National [place] on Aging: https://vp3vkdl.sarah.nih.gov  Contact a health care provider if:  · You are afraid of falling at home.  · You feel weak, drowsy, or dizzy at home.  · You fall at home.  Summary  · There are many simple things that you can do to make your home safe and to help prevent falls.  · Ways to make your home safe include removing tripping hazards and installing grab bars in the bathroom.  · Ask for help when making these changes in your home.  This information is not intended to replace advice given to you by your health care provider. Make sure you discuss any questions you have with your health care provider.  Document Revised: 11/30/2018 Document Reviewed: 08/02/2018  [employer] Patient Education © 2021 [employer]      Sit-to-Stand Exercise    The sit-to-stand exercise (also known as the chair stand or chair rise exercise) strengthens your lower body and helps you maintain or improve your mobility and independence. The goal is to do the sit-to-stand exercise without using your hands. This will be easier as you become stronger. You should always talk with your health care provider before starting any exercise program, especially if you have had recent surgery.  Do the exercise exactly as told by your health care provider and adjust it as directed. It is normal to feel mild stretching, pulling, tightness, or discomfort as you do this exercise, but you should stop right away if you feel sudden pain or your pain gets worse. Do not begin doing this exercise  until told by your health care provider.  What the sit-to-stand exercise does  The sit-to-stand exercise helps to strengthen the muscles in your thighs and the muscles in the center of your body that give you stability (core muscles). This exercise is especially helpful if:  · You have had knee or hip surgery.  · You have trouble getting up from a chair, out of a car, or off the toilet.  How to do the sit-to-stand exercise  1. Sit toward the front edge of a sturdy chair without armrests. Your knees should be bent and your feet should be flat on the floor and shoulder-width apart.  2. Place your hands lightly on each side of the seat. Keep your back and neck as straight as possible, with your chest slightly forward.  3. Breathe in slowly. Lean forward and slightly shift your weight to the front of your feet.  4. Breathe out as you slowly stand up. Use your hands as little as possible.  5. Stand and pause for a full breath in and out.  6. Breathe in as you sit down slowly. Tighten your core and abdominal muscles to control your lowering as much as possible.  7. Breathe out slowly.  8. Do this exercise 10-15 times. If needed, do it fewer times until you build up strength.  9. Rest for 1 minute, then do another set of 10-15 repetitions.  To change the difficulty of the sit-to-stand exercise  · If the exercise is too difficult, use a chair with sturdy armrests, and push off the armrests to help you come to the standing position. You can also use the armrests to help slowly lower yourself back to sitting. As this gets easier, try to use your arms less. You can also place a firm cushion or pillow on the chair to make the surface higher.  · If this exercise is too easy, do not use your arms to help raise or lower yourself. You can also wear a weighted vest, use hand weights, increase your repetitions, or try a lower chair.  General tips  · You may feel tired when starting an exercise routine. This is normal.  · You may have  muscle soreness that lasts a few days. This is normal. As you get stronger, you may not feel muscle soreness.  · Use smooth, steady movements.  · Do not  hold your breath during strength exercises. This can cause unsafe changes in your blood pressure.  · Breathe in slowly through your nose, and breathe out slowly through your mouth.  Summary  · Strengthening your lower body is an important step to help you move safely and independently.  · The sit-to-stand exercise helps strengthen the muscles in your thighs and core.  · You should always talk with your health care provider before starting any exercise program, especially if you have had recent surgery.  This information is not intended to replace advice given to you by your health care provider. Make sure you discuss any questions you have with your health care provider.  Document Revised: 10/16/2019 Document Reviewed: 02/08/2018  [employer] Patient Education © 2021 [employer]      Exercising to Stay Healthy  To become healthy and stay healthy, it is recommended that you do moderate-intensity and vigorous-intensity exercise. You can tell that you are exercising at a moderate intensity if your heart starts beating faster and you start breathing faster but can still hold a conversation. You can tell that you are exercising at a vigorous intensity if you are breathing much harder and faster and cannot hold a conversation while exercising.  Exercising regularly is important. It has many health benefits, such as:  · Improving overall fitness, flexibility, and endurance.  · Increasing bone density.  · Helping with weight control.  · Decreasing body fat.  · Increasing muscle strength.  · Reducing stress and tension.  · Improving overall health.  How often should I exercise?  Choose an activity that you enjoy, and set realistic goals. Your health care provider can help you make an activity plan that works for you.  Exercise regularly as told by your health care provider.  This may include:  · Doing strength training two times a week, such as:  ? Lifting weights.  ? Using resistance bands.  ? Push-ups.  ? Sit-ups.  ? Yoga.  · Doing a certain intensity of exercise for a given amount of time. Choose from these options:  ? A total of 150 minutes of moderate-intensity exercise every week.  ? A total of 75 minutes of vigorous-intensity exercise every week.  ? A mix of moderate-intensity and vigorous-intensity exercise every week.  Children, pregnant women, people who have not exercised regularly, people who are overweight, and older adults may need to talk with a health care provider about what activities are safe to do. If you have a medical condition, be sure to talk with your health care provider before you start a new exercise program.  What are some exercise ideas?  Moderate-intensity exercise ideas include:  · Walking 1 mile (1.6 km) in about 15 minutes.  · Biking.  · Hiking.  · Golfing.  · Dancing.  · Water aerobics.  Vigorous-intensity exercise ideas include:  · Walking 4.5 miles (7.2 km) or more in about 1 hour.  · Jogging or running 5 miles (8 km) in about 1 hour.  · Biking 10 miles (16.1 km) or more in about 1 hour.  · Lap swimming.  · Roller-skating or in-line skating.  · Cross-country skiing.  · Vigorous competitive sports, such as football, basketball, and soccer.  · Jumping rope.  · Aerobic dancing.  What are some everyday activities that can help me to get exercise?  · Yard work, such as:  ? Pushing a .  ? Raking and bagging leaves.  · Washing your car.  · Pushing a stroller.  · Shoveling snow.  · Gardening.  · Washing windows or floors.  How can I be more active in my day-to-day activities?  · Use stairs instead of an elevator.  · Take a walk during your lunch break.  · If you drive, park your car farther away from your work or school.  · If you take public transportation, get off one stop early and walk the rest of the way.  · Stand up or walk around during all  of your indoor phone calls.  · Get up, stretch, and walk around every 30 minutes throughout the day.  · Enjoy exercise with a friend. Support to continue exercising will help you keep a regular routine of activity.  What guidelines can I follow while exercising?  · Before you start a new exercise program, talk with your health care provider.  · Do not exercise so much that you hurt yourself, feel dizzy, or get very short of breath.  · Wear comfortable clothes and wear shoes with good support.  · Drink plenty of water while you exercise to prevent dehydration or heat stroke.  · Work out until your breathing and your heartbeat get faster.  Where to find more information  · [place] Department of Health and Human Services: www.hhs.gov  · Centers for Disease Control and Prevention (CDC): www.cdc.gov  Summary  · Exercising regularly is important. It will improve your overall fitness, flexibility, and endurance.  · Regular exercise also will improve your overall health. It can help you control your weight, reduce stress, and improve your bone density.  · Do not exercise so much that you hurt yourself, feel dizzy, or get very short of breath.  · Before you start a new exercise program, talk with your health care provider.  This information is not intended to replace advice given to you by your health care provider. Make sure you discuss any questions you have with your health care provider.  Document Revised: 11/30/2018 Document Reviewed: 11/08/2018  [employer] Patient Education © 2021 [employer]      Fall Prevention in the Home, Adult  Falls can cause injuries and can affect people from all age groups. There are many simple things that you can do to make your home safe and to help prevent falls. Ask for help when making these changes, if needed.  What actions can I take to prevent falls?  General instructions  · Use good lighting in all rooms. Replace any light bulbs that burn out.  · Turn on lights if it is dark. Use  night-lights.  · Place frequently used items in easy-to-reach places. Lower the shelves around your home if necessary.  · Set up furniture so that there are clear paths around it. Avoid moving your furniture around.  · Remove throw rugs and other tripping hazards from the floor.  · Avoid walking on wet floors.  · Fix any uneven floor surfaces.  · Add color or contrast paint or tape to grab bars and handrails in your home. Place contrasting color strips on the first and last steps of stairways.  · When you use a stepladder, make sure that it is completely opened and that the sides are firmly locked. Have someone hold the ladder while you are using it. Do not climb a closed stepladder.  · Be aware of any and all pets.  What can I do in the bathroom?         · Keep the floor dry. Immediately clean up any water that spills onto the floor.  · Remove soap buildup in the tub or shower on a regular basis.  · Use non-skid mats or decals on the floor of the tub or shower.  · Attach bath mats securely with double-sided, non-slip rug tape.  · If you need to sit down while you are in the shower, use a plastic, non-slip stool.  · Install grab bars by the toilet and in the tub and shower. Do not use towel bars as grab bars.  What can I do in the bedroom?  · Make sure that a bedside light is easy to reach.  · Do not use oversized bedding that drapes onto the floor.  · Have a firm chair that has side arms to use for getting dressed.  What can I do in the kitchen?  · Clean up any spills right away.  · If you need to reach for something above you, use a sturdy step stool that has a grab bar.  · Keep electrical cables out of the way.  · Do not use floor polish or wax that makes floors slippery. If you must use wax, make sure that it is non-skid floor wax.  What can I do in the stairways?  · Do not leave any items on the stairs.  · Make sure that you have a light switch at the top of the stairs and the bottom of the stairs. Have them  installed if you do not have them.  · Make sure that there are handrails on both sides of the stairs. Fix handrails that are broken or loose. Make sure that handrails are as long as the stairways.  · Install non-slip stair treads on all stairs in your home.  · Avoid having throw rugs at the top or bottom of stairways, or secure the rugs with carpet tape to prevent them from moving.  · Choose a carpet design that does not hide the edge of steps on the stairway.  · Check any carpeting to make sure that it is firmly attached to the stairs. Fix any carpet that is loose or worn.  What can I do on the outside of my home?  · Use bright outdoor lighting.  · Regularly repair the edges of walkways and driveways and fix any cracks.  · Remove high doorway thresholds.  · Trim any shrubbery on the main path into your home.  · Regularly check that handrails are securely fastened and in good repair. Both sides of any steps should have handrails.  · Install guardrails along the edges of any raised decks or porches.  · Clear walkways of debris and clutter, including tools and rocks.  · Have leaves, snow, and ice cleared regularly.  · Use sand or salt on walkways during winter months.  · In the garage, clean up any spills right away, including grease or oil spills.  What other actions can I take?  · Wear closed-toe shoes that fit well and support your feet. Wear shoes that have rubber soles or low heels.  · Use mobility aids as needed, such as canes, walkers, scooters, and crutches.  · Review your medicines with your health care provider. Some medicines can cause dizziness or changes in blood pressure, which increase your risk of falling.  Talk with your health care provider about other ways that you can decrease your risk of falls. This may include working with a physical therapist or  to improve your strength, balance, and endurance.  Where to find more information  · Centers for Disease Control and PreventionDINA:  https://www.cdc.gov  · National [place] on Aging: https://wo1zuyg.sarah.nih.gov  Contact a health care provider if:  · You are afraid of falling at home.  · You feel weak, drowsy, or dizzy at home.  · You fall at home.  Summary  · There are many simple things that you can do to make your home safe and to help prevent falls.  · Ways to make your home safe include removing tripping hazards and installing grab bars in the bathroom.  · Ask for help when making these changes in your home.  This information is not intended to replace advice given to you by your health care provider. Make sure you discuss any questions you have with your health care provider.  Document Revised: 11/30/2018 Document Reviewed: 08/02/2018  [employer] Patient Education © 2021 [employer]      Sit-to-Stand Exercise    The sit-to-stand exercise (also known as the chair stand or chair rise exercise) strengthens your lower body and helps you maintain or improve your mobility and independence. The goal is to do the sit-to-stand exercise without using your hands. This will be easier as you become stronger. You should always talk with your health care provider before starting any exercise program, especially if you have had recent surgery.  Do the exercise exactly as told by your health care provider and adjust it as directed. It is normal to feel mild stretching, pulling, tightness, or discomfort as you do this exercise, but you should stop right away if you feel sudden pain or your pain gets worse. Do not begin doing this exercise until told by your health care provider.  What the sit-to-stand exercise does  The sit-to-stand exercise helps to strengthen the muscles in your thighs and the muscles in the center of your body that give you stability (core muscles). This exercise is especially helpful if:  · You have had knee or hip surgery.  · You have trouble getting up from a chair, out of a car, or off the toilet.  How to do the sit-to-stand  exercise  10. Sit toward the front edge of a sturdy chair without armrests. Your knees should be bent and your feet should be flat on the floor and shoulder-width apart.  11. Place your hands lightly on each side of the seat. Keep your back and neck as straight as possible, with your chest slightly forward.  12. Breathe in slowly. Lean forward and slightly shift your weight to the front of your feet.  13. Breathe out as you slowly stand up. Use your hands as little as possible.  14. Stand and pause for a full breath in and out.  15. Breathe in as you sit down slowly. Tighten your core and abdominal muscles to control your lowering as much as possible.  16. Breathe out slowly.  17. Do this exercise 10-15 times. If needed, do it fewer times until you build up strength.  18. Rest for 1 minute, then do another set of 10-15 repetitions.  To change the difficulty of the sit-to-stand exercise  · If the exercise is too difficult, use a chair with sturdy armrests, and push off the armrests to help you come to the standing position. You can also use the armrests to help slowly lower yourself back to sitting. As this gets easier, try to use your arms less. You can also place a firm cushion or pillow on the chair to make the surface higher.  · If this exercise is too easy, do not use your arms to help raise or lower yourself. You can also wear a weighted vest, use hand weights, increase your repetitions, or try a lower chair.  General tips  · You may feel tired when starting an exercise routine. This is normal.  · You may have muscle soreness that lasts a few days. This is normal. As you get stronger, you may not feel muscle soreness.  · Use smooth, steady movements.  · Do not  hold your breath during strength exercises. This can cause unsafe changes in your blood pressure.  · Breathe in slowly through your nose, and breathe out slowly through your mouth.  Summary  · Strengthening your lower body is an important step to help  you move safely and independently.  · The sit-to-stand exercise helps strengthen the muscles in your thighs and core.  · You should always talk with your health care provider before starting any exercise program, especially if you have had recent surgery.  This information is not intended to replace advice given to you by your health care provider. Make sure you discuss any questions you have with your health care provider.  Document Revised: 10/16/2019 Document Reviewed: 02/08/2018  [employer] Patient Education © 2021 [employer]

## 2021-12-08 ENCOUNTER — OFFICE VISIT (OUTPATIENT)
Dept: FAMILY MEDICINE CLINIC | Facility: CLINIC | Age: 81
End: 2021-12-08

## 2021-12-08 VITALS
DIASTOLIC BLOOD PRESSURE: 108 MMHG | HEART RATE: 75 BPM | TEMPERATURE: 98.1 F | OXYGEN SATURATION: 99 % | WEIGHT: 127 LBS | BODY MASS INDEX: 25.65 KG/M2 | SYSTOLIC BLOOD PRESSURE: 198 MMHG

## 2021-12-08 DIAGNOSIS — I10 ESSENTIAL HYPERTENSION: Primary | ICD-10-CM

## 2021-12-08 PROCEDURE — 99214 OFFICE O/P EST MOD 30 MIN: CPT | Performed by: NURSE PRACTITIONER

## 2021-12-08 RX ORDER — AMLODIPINE BESYLATE 5 MG/1
5 TABLET ORAL DAILY
Qty: 30 TABLET | Refills: 0 | Status: SHIPPED | OUTPATIENT
Start: 2021-12-08 | End: 2021-12-15 | Stop reason: SDUPTHER

## 2021-12-08 RX ORDER — HYDROCHLOROTHIAZIDE 25 MG/1
25 TABLET ORAL DAILY
Qty: 30 TABLET | Refills: 0 | Status: SHIPPED | OUTPATIENT
Start: 2021-12-08 | End: 2021-12-15

## 2021-12-08 NOTE — PROGRESS NOTES
Chief Complaint  Hypertension (Follow-up bp)    Subjective            Cristiane Bishop presents to [facility]  History of Present Illness     Follow up on hypertension. She brought her wrist cuff and arm cuff with her today, as well as her log book.     Her log at home shows BP readings as follows:    11-26: 143/80  11-27: 150/73, 145/76, 133/74  11-28: 149/81  11-29: 148/80  11-30: 137/76  12-2: 151/83, 135/83  12-4: 151/75, 151/81  12-5: 177/90 (hot flashes)  12-6: 164/86, 160/79 (ate a lot of salt)  12-7: 130/70, 161/92    She is taking lisinopril 20mg BID. She denies any chest pain or palpitations. She does endorse intermittent headaches. She thought it was sinus trouble until she started taking her BP at home. No dizziness or blurred vision. No swelling in the legs. She is not short of breath. No c/o dry cough with use of lisinopril.       Past Medical History:   Diagnosis Date   • Allergic rhinitis    • Breast cancer (HCC)     RIGHT   • GERD (gastroesophageal reflux disease)    • Heel pain    • Heel spur    • HTN (hypertension)    • Hypertension    • Ingrown toenail    • Mood disorder (HCC)        Allergies   Allergen Reactions   • Penicillins Rash   • Sulfa Antibiotics Rash   • Tetracycline Nausea Only and Rash     Difficulty swallowing          Past Surgical History:   Procedure Laterality Date   • ABDOMINAL HYSTERECTOMY     • APPENDECTOMY     • BREAST BIOPSY Right    • COLONOSCOPY N/A 8/30/2021    Procedure: COLONOSCOPY;  Surgeon: Norman Damon MD;  Location: [facility] ENDOSCOPY;  Service: Gastroenterology;  Laterality: N/A;  NORMAL COLON   • ENDOSCOPY N/A 8/30/2021    Procedure: ESOPHAGOGASTRODUODENOSCOPY WITH BIOPSY;  Surgeon: Norman Damon MD;  Location: [facility] ENDOSCOPY;  Service: Gastroenterology;  Laterality: N/A;  GASTRITIS/HIATAL HERNIA   • ENDOSCOPY N/A 9/27/2021    Procedure: ESOPHAGOGASTRODUODENOSCOPY with biopsies;  Surgeon: Norman Damon  MD;  Location: [facility] ENDOSCOPY;  Service: Gastroenterology;  Laterality: N/A;  GASTRITIS, HIATAL HERNIA   • HYSTERECTOMY     • MASTECTOMY     • MASTECTOMY Right 05/2015        Social History     Tobacco Use   • Smoking status: Never Smoker   • Smokeless tobacco: Never Used   Vaping Use   • Vaping Use: Never used   Substance Use Topics   • Alcohol use: Never   • Drug use: Never       Family History   Problem Relation Age of Onset   • Lymphoma Mother    • Breast cancer Mother    • Lymphoma Maternal Uncle    • Diabetes Maternal Uncle    • Heart disease Other    • Diabetes Paternal Uncle    • Lymphoma Other    • Stroke Other    • Colon cancer Neg Hx    • Malig Hyperthermia Neg Hx         Health Maintenance Due   Topic Date Due   • HEPATITIS C SCREENING  Never done        Current Outpatient Medications on File Prior to Visit   Medication Sig   • Calcium Carb-Cholecalciferol (Calcium 600+D3) 600-200 MG-UNIT tablet Take  by mouth Daily.   • docusate sodium (Stool Softener) 100 MG capsule Take 100 mg by mouth Daily.   • ferrous sulfate 325 (65 FE) MG tablet Take 325 mg by mouth Daily With Breakfast.   • lisinopril (PRINIVIL,ZESTRIL) 20 MG tablet Take 1 tablet by mouth 2 (Two) Times a Day.   • loratadine (CLARITIN) 10 MG tablet loratadine 10 mg oral tablet take 1 tablet (10 mg) by oral route once daily   Active   • omeprazole (priLOSEC) 40 MG capsule Take 1 capsule by mouth Daily.   • PARoxetine (PAXIL) 20 MG tablet Take 1 tablet by mouth Every Morning.   • nystatin (MYCOSTATIN) 100,000 unit/mL suspension Swish and swallow 5 mL 4 (Four) Times a Day.     No current facility-administered medications on file prior to visit.       Immunization History   Administered Date(s) Administered   • COVID-19 (PFIZER) 02/26/2021, 03/19/2021, 11/05/2021   • Fluad Quad 65+ 10/06/2020   • Fluzone High-Dose 65+yrs 11/05/2021   • Hepatitis A 07/10/2018, 04/01/2019   • Influenza, Unspecified 10/26/2017, 10/03/2018   • Pneumococcal Conjugate  13-Valent (PCV13) 10/17/2016   • Pneumococcal Polysaccharide (PPSV23) 03/26/2018   • Shingrix 10/06/2020, 12/05/2020   • Tdap 01/01/2014, 09/15/2014   • Zostavax 01/01/2014       Review of Systems     Objective     BP (!) 198/108   Pulse 75   Temp 98.1 °F (36.7 °C)   Wt 57.6 kg (127 lb)   SpO2 99%   BMI 25.65 kg/m²       Physical Exam  Vitals reviewed.   Constitutional:       General: She is not in acute distress.     Appearance: Normal appearance. She is well-developed.   HENT:      Head: Normocephalic and atraumatic.   Eyes:      General: No scleral icterus.     Conjunctiva/sclera: Conjunctivae normal.   Neck:      Vascular: No carotid bruit.      Trachea: Trachea normal.   Cardiovascular:      Rate and Rhythm: Normal rate and regular rhythm.      Pulses: Normal pulses.      Heart sounds: No murmur heard.      Pulmonary:      Effort: Pulmonary effort is normal.      Breath sounds: Normal breath sounds. No wheezing, rhonchi or rales.   Musculoskeletal:         General: Normal range of motion.      Cervical back: Normal range of motion and neck supple.      Right lower leg: No edema.      Left lower leg: No edema.   Lymphadenopathy:      Cervical: No cervical adenopathy.   Skin:     General: Skin is warm and dry.   Neurological:      Mental Status: She is alert and oriented to person, place, and time.   Psychiatric:         Mood and Affect: Mood and affect normal.         Behavior: Behavior normal.         Thought Content: Thought content normal.         Judgment: Judgment normal.         Result Review :     The following data was reviewed by: ORLIN Denson on 12/08/2021:    CMP    CMP 7/26/21 9/7/21 9/15/21 9/15/21      0942 0942   Glucose 192 (A) 92 157 (A)    BUN 16 18 18    Creatinine 0.88 0.91 0.94    eGFR Non  Am 62 60 (A) 57 (A)    Sodium 132 (A) 134 (A) 135 (A)    Potassium 3.9 4.2 4.1    Chloride 93 (A) 97 (A) 99    Calcium 9.1 9.7 9.8    Total Protein    7.3   Albumin 3.60 4.20 4.10  3.7   Globulin    3.6   Total Bilirubin 0.5 0.3 0.4    Alkaline Phosphatase 80 114 114    AST (SGOT) 47 (A) 20 25    ALT (SGPT) 42 (A) 9 12    (A) Abnormal value            CBC    CBC 6/18/21 7/26/21 9/15/21   WBC 6.50 8.30 5.59   RBC 3.54 (A) 3.80 3.90   Hemoglobin 11.2 (A) 11.9 (A) 12.2   Hematocrit 33.6 (A) 36.2 37.6   MCV 94.9 95.3 96.4   MCH 31.6 31.3 31.3   MCHC 33.3 32.9 32.4   RDW 13.4 12.2 (A) 12.9   Platelets 267 311 289   (A) Abnormal value            TSH    TSH 7/26/21   TSH 2.940                  Assessment and Plan      Diagnoses and all orders for this visit:    1. Essential hypertension (Primary)  Comments:  currently not controlled - continue lisinopril 20mg BID, and add HCTZ 25mg daily in the morning. Follow up next week.   Orders:  -     hydroCHLOROthiazide (HYDRODIURIL) 25 MG tablet; Take 1 tablet by mouth Daily.  Dispense: 30 tablet; Refill: 0  -     amLODIPine (NORVASC) 5 MG tablet; Take 1 tablet by mouth Daily.  Dispense: 30 tablet; Refill: 0            Follow Up     Return in about 1 week (around 12/15/2021) for Recheck.     Plan to recheck BMP at that time, as well as catecholamines.     Initially RX HCTZ; pharmacy called with a concern for allergy d/t sulfa allergy hx. Will change to Norvasc 5mg.     Patient was given instructions and counseling regarding her condition or for health maintenance advice. Please see specific information pulled into the AVS if appropriate.     Cristiane A Edna  reports that she has never smoked. She has never used smokeless tobacco.

## 2021-12-12 DIAGNOSIS — I10 ESSENTIAL HYPERTENSION: ICD-10-CM

## 2021-12-13 RX ORDER — LISINOPRIL 20 MG/1
20 TABLET ORAL 2 TIMES DAILY
Qty: 180 TABLET | Refills: 0 | Status: SHIPPED | OUTPATIENT
Start: 2021-12-13 | End: 2022-03-02

## 2021-12-14 ENCOUNTER — OFFICE VISIT (OUTPATIENT)
Dept: GASTROENTEROLOGY | Facility: CLINIC | Age: 81
End: 2021-12-14

## 2021-12-14 VITALS
WEIGHT: 129.4 LBS | SYSTOLIC BLOOD PRESSURE: 142 MMHG | HEIGHT: 59 IN | HEART RATE: 79 BPM | BODY MASS INDEX: 26.08 KG/M2 | DIASTOLIC BLOOD PRESSURE: 72 MMHG | OXYGEN SATURATION: 98 %

## 2021-12-14 DIAGNOSIS — K44.9 HIATAL HERNIA: ICD-10-CM

## 2021-12-14 DIAGNOSIS — A04.8 H. PYLORI INFECTION: Primary | ICD-10-CM

## 2021-12-14 PROCEDURE — 99213 OFFICE O/P EST LOW 20 MIN: CPT | Performed by: NURSE PRACTITIONER

## 2021-12-14 NOTE — PROGRESS NOTES
Patient Name: Cristiane Bishop   Visit Date: 12/14/2021   Patient ID: [id number]  Provider: ORLIN Mccain    Sex: female  Location:  Location Address:  Location Phone: [phone]  [place] [address] [phone]    YOB: 1940  Age: 81 y.o.   Primary Care Provider Azalea Abdullahi APRN      Referring Provider: No ref. provider found        Chief Complaint  H Pylori    History of Present Illness  Patient initially presented July 2021 with complaint of stool positive for H. pylori and cannot take antibiotics due to allergy to penicillin and tetracycline, she was tried on Flagyl and Tetracycline but reported it made her feel more ill.  Reports test was done due to mouth burning complaint.  Patient also had persistent heartburn with Protonix 40 mg and had added omeprazole 20 over-the-counter to regimen.    EGD/colonoscopy 8/30/2021: Small hiatal hernia, erosions noted in the stomach with recent bleeding noted-gastritis with positive H. pylori noted, duodenum normal good prep, negative colonoscopy.  Patient was advised to have repeat EGD due to medication allergies so that culture and sensitivity could be evaluated  EGD 9/27/2021: Small hiatal hernia, gastritis noted, normal duodenum-C&S report showed amoxicillin, Levaquin, clarithromycin and tetracycline were all susceptible only Flagyl was resistant; after review with Dr. Damon, we treated with Levaquin, clarithromycin, and bismuth    Pt states she is feeling better. NO HB now w Omeprazole 40 mg/d. States burning mouth c/o is also better. No abd pain. Pt states she was able to tolerate the antibiotics given after last EGD.   No blood in stool or black stool.   Past Medical History:   Diagnosis Date   • Allergic rhinitis    • Breast cancer (HCC)     RIGHT   • GERD (gastroesophageal reflux disease)    • Heel pain    • Heel spur    • HTN (hypertension)    • Hypertension    • Ingrown toenail    • Mood disorder (HCC)        Past  "Surgical History:   Procedure Laterality Date   • ABDOMINAL HYSTERECTOMY     • APPENDECTOMY     • BREAST BIOPSY Right    • COLONOSCOPY N/A 8/30/2021    Procedure: COLONOSCOPY;  Surgeon: Norman Damon MD;  Location: [facility] ENDOSCOPY;  Service: Gastroenterology;  Laterality: N/A;  NORMAL COLON   • ENDOSCOPY N/A 8/30/2021    Procedure: ESOPHAGOGASTRODUODENOSCOPY WITH BIOPSY;  Surgeon: Norman Damon MD;  Location: [facility] ENDOSCOPY;  Service: Gastroenterology;  Laterality: N/A;  GASTRITIS/HIATAL HERNIA   • ENDOSCOPY N/A 9/27/2021    Procedure: ESOPHAGOGASTRODUODENOSCOPY with biopsies;  Surgeon: Norman Damon MD;  Location: [facility] ENDOSCOPY;  Service: Gastroenterology;  Laterality: N/A;  GASTRITIS, HIATAL HERNIA   • HYSTERECTOMY     • MASTECTOMY     • MASTECTOMY Right 05/2015       Allergies   Allergen Reactions   • Penicillins Rash   • Sulfa Antibiotics Rash   • Tetracycline Nausea Only and Rash     Difficulty swallowing         Family History   Problem Relation Age of Onset   • Lymphoma Mother    • Breast cancer Mother    • Lymphoma Maternal Uncle    • Diabetes Maternal Uncle    • Heart disease Other    • Diabetes Paternal Uncle    • Lymphoma Other    • Stroke Other    • Colon cancer Neg Hx    • Malig Hyperthermia Neg Hx         Social History     Tobacco Use   • Smoking status: Never Smoker   • Smokeless tobacco: Never Used   Vaping Use   • Vaping Use: Never used   Substance Use Topics   • Alcohol use: Never   • Drug use: Never       Objective     Vital Signs:   /72 (BP Location: Left arm, Patient Position: Sitting, Cuff Size: Adult)   Pulse 79   Ht 149.9 cm (59\")   Wt 58.7 kg (129 lb 6.4 oz)   SpO2 98%   BMI 26.14 kg/m²       Physical Exam  Constitutional:       General: The patient is not in acute distress.     Appearance: Normal appearance.   HENT:      Head: Normocephalic and atraumatic.      Nose: Nose normal.   Pulmonary:      Effort: Pulmonary effort is normal. No respiratory " distress.   Abdominal:      General: Abdomen is flat.      Palpations: Abdomen is soft. There is no mass.      Tenderness: There is no abdominal tenderness. There is no guarding.   Musculoskeletal:      Cervical back: Neck supple.      Right lower leg: No edema.      Left lower leg: No edema.   Skin:     General: Skin is warm and dry.   Neurological:      General: No focal deficit present.      Mental Status: The patient is alert and oriented to person, place, and time.      Gait: Gait normal.   Psychiatric:         Mood and Affect: Mood normal.         Speech: Speech normal.         Behavior: Behavior normal.         Thought Content: Thought content normal.     Result Review :   The following data was reviewed by: ORLIN Mccain on 12/14/2021:              Assessment and Plan    Diagnoses and all orders for this visit:    1. H. pylori infection (Primary)  Comments:  w associated gastritis  Orders:  -     H. Pylori Antigen, Stool - Stool, Per Rectum    2. Hiatal hernia            Follow Up   Return if symptoms worsen or fail to improve.   Patient opted to do stool check for H. pylori instead of breath test to ensure treatment was successful.  She was asked to stop her PPI 2 weeks prior to stool collection.  Patient is feeling well and really is not having any GI complaints now so she may follow-up with us as needed. I advised her she may also try to wean off Omeprazole  Call or return to clinic PRN if any change in bowel pattern, blood in stool, or new GI sx. Patient was given instructions and counseling regarding her condition or for health maintenance advice. Please see specific information pulled into the AVS if appropriate.

## 2021-12-15 ENCOUNTER — OFFICE VISIT (OUTPATIENT)
Dept: FAMILY MEDICINE CLINIC | Facility: CLINIC | Age: 81
End: 2021-12-15

## 2021-12-15 VITALS
WEIGHT: 125 LBS | HEART RATE: 101 BPM | BODY MASS INDEX: 25.25 KG/M2 | OXYGEN SATURATION: 98 % | DIASTOLIC BLOOD PRESSURE: 90 MMHG | TEMPERATURE: 96.9 F | SYSTOLIC BLOOD PRESSURE: 170 MMHG

## 2021-12-15 DIAGNOSIS — I10 ESSENTIAL HYPERTENSION: Primary | ICD-10-CM

## 2021-12-15 DIAGNOSIS — Z11.59 NEED FOR HEPATITIS C SCREENING TEST: ICD-10-CM

## 2021-12-15 LAB
ANION GAP SERPL CALCULATED.3IONS-SCNC: 9.4 MMOL/L (ref 5–15)
BUN SERPL-MCNC: 16 MG/DL (ref 8–23)
BUN/CREAT SERPL: 17.6 (ref 7–25)
CALCIUM SPEC-SCNC: 9.5 MG/DL (ref 8.6–10.5)
CHLORIDE SERPL-SCNC: 92 MMOL/L (ref 98–107)
CO2 SERPL-SCNC: 28.6 MMOL/L (ref 22–29)
CREAT SERPL-MCNC: 0.91 MG/DL (ref 0.57–1)
GFR SERPL CREATININE-BSD FRML MDRD: 60 ML/MIN/1.73
GLUCOSE SERPL-MCNC: 124 MG/DL (ref 65–99)
HCV AB SER DONR QL: NORMAL
POTASSIUM SERPL-SCNC: 4.2 MMOL/L (ref 3.5–5.2)
SODIUM SERPL-SCNC: 130 MMOL/L (ref 136–145)

## 2021-12-15 PROCEDURE — 86803 HEPATITIS C AB TEST: CPT | Performed by: NURSE PRACTITIONER

## 2021-12-15 PROCEDURE — 82384 ASSAY THREE CATECHOLAMINES: CPT | Performed by: NURSE PRACTITIONER

## 2021-12-15 PROCEDURE — 80048 BASIC METABOLIC PNL TOTAL CA: CPT | Performed by: NURSE PRACTITIONER

## 2021-12-15 PROCEDURE — 99214 OFFICE O/P EST MOD 30 MIN: CPT | Performed by: NURSE PRACTITIONER

## 2021-12-15 PROCEDURE — 36415 COLL VENOUS BLD VENIPUNCTURE: CPT | Performed by: NURSE PRACTITIONER

## 2021-12-15 RX ORDER — ASPIRIN 81 MG/1
81 TABLET ORAL DAILY
COMMUNITY

## 2021-12-15 RX ORDER — AMLODIPINE BESYLATE 5 MG/1
5 TABLET ORAL DAILY
Qty: 30 TABLET | Refills: 0 | Status: SHIPPED | OUTPATIENT
Start: 2021-12-15 | End: 2022-01-06

## 2021-12-15 NOTE — PROGRESS NOTES
Venipuncture Blood Specimen Collection  Venipuncture performed in left arm  by Patricia Morris with good hemostasis. Patient tolerated the procedure well without complications.   12/15/21   Patricia Morris

## 2021-12-15 NOTE — PROGRESS NOTES
Mode of Visit: Video  Location of patient: other: Office   Location of provider: home  You have chosen to receive care through a telehealth visit.  Does the patient consent to use a video/audio connection for your medical care today? Yes  The visit included audio and video interaction. No technical issues occurred during this visit.     Chief Complaint  Hypertension    Subjective          Cristiane Bishop presents to [facility]  History of Present Illness     Her blood pressure at home has been running well per her report. She brought her log with her today and readings are as follows:     131/76, 105/65, 131/50, 151/77, 119/74     She denies any chest pain or palpitations. She reports that she has not had any headaches since starting Norvasc. She denies any dizziness or shortness of breath. No swelling in her legs.     She went to her GI provider yesterday and her BP there was 142/72. She attributes the elevation in her BP today to having to wait in the waiting room.     Objective   Vital Signs:   /90   Pulse 101   Temp 96.9 °F (36.1 °C)   Wt 56.7 kg (125 lb)   SpO2 98%   BMI 25.25 kg/m²       Physical Exam   Constitutional: She appears well-developed and well-nourished. No distress.   HENT:   Head: Normocephalic and atraumatic.   Eyes: EOM are normal. No scleral icterus.   Neck: Neck normal appearance.  Pulmonary/Chest: Effort normal.   Musculoskeletal: Normal range of motion.   Neurological: She is alert.   Skin: No erythema. No pallor.   Psychiatric: She has a normal mood and affect.   Vitals reviewed.         Result Review :{Labs  Result Review  Imaging  Med Tab  Media  Procedures :23}   The following data was reviewed by: ORLIN Denson on 12/15/2021:  CMP    CMP 7/26/21 9/7/21 9/15/21 9/15/21      0942 0942   Glucose 192 (A) 92 157 (A)    BUN 16 18 18    Creatinine 0.88 0.91 0.94    eGFR Non  Am 62 60 (A) 57 (A)    Sodium 132 (A) 134 (A)  135 (A)    Potassium 3.9 4.2 4.1    Chloride 93 (A) 97 (A) 99    Calcium 9.1 9.7 9.8    Total Protein    7.3   Albumin 3.60 4.20 4.10 3.7   Globulin    3.6   Total Bilirubin 0.5 0.3 0.4    Alkaline Phosphatase 80 114 114    AST (SGOT) 47 (A) 20 25    ALT (SGPT) 42 (A) 9 12    (A) Abnormal value            CBC    CBC 6/18/21 7/26/21 9/15/21   WBC 6.50 8.30 5.59   RBC 3.54 (A) 3.80 3.90   Hemoglobin 11.2 (A) 11.9 (A) 12.2   Hematocrit 33.6 (A) 36.2 37.6   MCV 94.9 95.3 96.4   MCH 31.6 31.3 31.3   MCHC 33.3 32.9 32.4   RDW 13.4 12.2 (A) 12.9   Platelets 267 311 289   (A) Abnormal value            TSH    TSH 7/26/21   TSH 2.940         Data reviewed: Consultant notes :   Office Visit with Chelle Barclay APRN (12/14/2021)           Assessment and Plan    Diagnoses and all orders for this visit:    1. Essential hypertension (Primary)  -     Basic metabolic panel  -     Catecholamines, Fractionated, Plasma  -     amLODIPine (NORVASC) 5 MG tablet; Take 1 tablet by mouth Daily.  Dispense: 30 tablet; Refill: 0    2. Need for hepatitis C screening test  -     Hepatitis C Antibody           Follow Up       Return in about 1 month (around 1/15/2022) for Recheck.     Continue lisinopril 20mg BID and Norvasc 5mg daily. Continue to monitor BP at home and call with readings >140/90 consistently.    Patient was given instructions and counseling regarding her condition or for health maintenance advice. Please see specific information pulled into the AVS if appropriate.

## 2021-12-18 LAB
DOPAMINE SERPL-MCNC: <30 PG/ML (ref 0–48)
EPINEPH PLAS-MCNC: 30 PG/ML (ref 0–62)
NOREPINEPH PLAS-MCNC: 1010 PG/ML (ref 0–874)

## 2021-12-21 DIAGNOSIS — R82.5 ELEVATED URINE LEVELS OF CATECHOLAMINES: Primary | ICD-10-CM

## 2022-01-04 ENCOUNTER — HOSPITAL ENCOUNTER (OUTPATIENT)
Dept: CT IMAGING | Facility: HOSPITAL | Age: 82
Discharge: HOME | End: 2022-01-04
Admitting: FAMILY MEDICINE

## 2022-01-04 DIAGNOSIS — R82.5 ELEVATED URINE LEVELS OF CATECHOLAMINES: ICD-10-CM

## 2022-01-04 PROCEDURE — 0 IOPAMIDOL PER 1 ML: Performed by: FAMILY MEDICINE

## 2022-01-04 PROCEDURE — 74177 CT ABD & PELVIS W/CONTRAST: CPT

## 2022-01-04 RX ADMIN — IOPAMIDOL 100 ML: 755 INJECTION, SOLUTION INTRAVENOUS at 15:53

## 2022-01-05 ENCOUNTER — TELEPHONE (OUTPATIENT)
Dept: GASTROENTEROLOGY | Facility: CLINIC | Age: 82
End: 2022-01-05

## 2022-01-05 ENCOUNTER — PREP FOR SURGERY (OUTPATIENT)
Dept: OTHER | Facility: HOSPITAL | Age: 82
End: 2022-01-05

## 2022-01-05 DIAGNOSIS — R93.3 ABNORMAL CT SCAN, STOMACH: Primary | ICD-10-CM

## 2022-01-05 NOTE — PROGRESS NOTES
Please call pt and have them make appt  this week to see azalea to discuss CT scan.  I ordered CT scan because Azalea wanted it ordered when she was out due to elevated catecholamines.

## 2022-01-05 NOTE — TELEPHONE ENCOUNTER
I received notification from primary care that patient had abnormal CT scan of the stomach showing a possible mass, I recommended repeat EGD, please arrange, I will make case request

## 2022-01-06 DIAGNOSIS — I10 ESSENTIAL HYPERTENSION: ICD-10-CM

## 2022-01-06 RX ORDER — AMLODIPINE BESYLATE 5 MG/1
5 TABLET ORAL DAILY
Qty: 30 TABLET | Refills: 0 | Status: SHIPPED | OUTPATIENT
Start: 2022-01-06 | End: 2022-01-13 | Stop reason: SDUPTHER

## 2022-01-10 PROBLEM — R93.3 ABNORMAL CT SCAN, STOMACH: Status: ACTIVE | Noted: 2022-01-10

## 2022-01-13 ENCOUNTER — OFFICE VISIT (OUTPATIENT)
Dept: FAMILY MEDICINE CLINIC | Facility: CLINIC | Age: 82
End: 2022-01-13

## 2022-01-13 VITALS
OXYGEN SATURATION: 95 % | TEMPERATURE: 97 F | BODY MASS INDEX: 25.8 KG/M2 | DIASTOLIC BLOOD PRESSURE: 82 MMHG | HEART RATE: 93 BPM | SYSTOLIC BLOOD PRESSURE: 140 MMHG | HEIGHT: 59 IN | WEIGHT: 128 LBS

## 2022-01-13 DIAGNOSIS — I10 ESSENTIAL HYPERTENSION: Primary | ICD-10-CM

## 2022-01-13 DIAGNOSIS — F41.9 ANXIETY AND DEPRESSION: ICD-10-CM

## 2022-01-13 DIAGNOSIS — K21.9 GASTROESOPHAGEAL REFLUX DISEASE, UNSPECIFIED WHETHER ESOPHAGITIS PRESENT: ICD-10-CM

## 2022-01-13 DIAGNOSIS — F32.A ANXIETY AND DEPRESSION: ICD-10-CM

## 2022-01-13 DIAGNOSIS — A04.8 BACTERIAL INFECTION DUE TO H. PYLORI: ICD-10-CM

## 2022-01-13 PROCEDURE — 99214 OFFICE O/P EST MOD 30 MIN: CPT | Performed by: NURSE PRACTITIONER

## 2022-01-13 RX ORDER — FAMOTIDINE 20 MG/1
TABLET, FILM COATED ORAL
Qty: 180 TABLET | Refills: 0 | Status: SHIPPED | OUTPATIENT
Start: 2022-01-13 | End: 2022-01-17 | Stop reason: HOSPADM

## 2022-01-13 RX ORDER — AMLODIPINE BESYLATE 5 MG/1
5 TABLET ORAL DAILY
Qty: 90 TABLET | Refills: 0 | Status: SHIPPED | OUTPATIENT
Start: 2022-01-13 | End: 2022-05-18 | Stop reason: SDUPTHER

## 2022-01-13 RX ORDER — FAMOTIDINE 20 MG/1
20 TABLET, FILM COATED ORAL 2 TIMES DAILY PRN
Qty: 60 TABLET | Refills: 0 | Status: SHIPPED | OUTPATIENT
Start: 2022-01-13 | End: 2022-01-13

## 2022-01-13 RX ORDER — PAROXETINE HYDROCHLORIDE 20 MG/1
20 TABLET, FILM COATED ORAL EVERY MORNING
Qty: 90 TABLET | Refills: 1 | Status: SHIPPED | OUTPATIENT
Start: 2022-01-13 | End: 2022-05-18 | Stop reason: SDUPTHER

## 2022-01-13 NOTE — PROGRESS NOTES
Chief Complaint  Hypertension    Subjective            Cristiane Bishop presents to [facility]  History of Present Illness     Follow up on HTN - she has been tracking her BP at home and it has been doing a lot better - readings at home have been 116/65, 128/62, 102/71, 132/64, 104/57, 110/64, 111/60, 109/64, 106/65, 115/59, 130/71, 109/58, 106/61.     She is not having any chest pain or palpitations. She does endorse orthostatic lightheadedness if she gets up too fast. She is not having any headaches.     She is going to have her EGD next week on 1/17 d/t abnormal findings of the gastric cardia on recent CT AP - it was done to check for possible adrenal tumor d/t elevated catecholamines. She has been having trouble with treating h. Pylori infection. Worrying about the upcoming EGD has her kind of down. She does not want to make any changes in her Paxil dose for now. No HI/SI. No AVH.     She does have some dysphagia. Everything seems to stick in the upper part of her chest. She hasn't had her acid reflux medicine in two weeks d/t needing to complete her h. Pylori stool antigen test. She is taking Pepto, but it isn't doing a whole lot. She is having heartburn, but not acid reflux. She also endorses nausea, but not vomiting.     PHQ-9 Total Score: 12    Past Medical History:   Diagnosis Date   • Allergic rhinitis    • Breast cancer (HCC)     RIGHT   • GERD (gastroesophageal reflux disease)    • Heel pain    • Heel spur    • HTN (hypertension)    • Hypertension    • Ingrown toenail    • Mood disorder (HCC)        Allergies   Allergen Reactions   • Penicillins Rash   • Sulfa Antibiotics Rash   • Tetracycline Nausea Only and Rash     Difficulty swallowing          Past Surgical History:   Procedure Laterality Date   • ABDOMINAL HYSTERECTOMY     • APPENDECTOMY     • BREAST BIOPSY Right    • COLONOSCOPY N/A 8/30/2021    Procedure: COLONOSCOPY;  Surgeon: Norman Damon MD;   Location: [facility] END[facility]COPY;  Service: Gastroenterology;  Laterality: N/A;  NORMAL COLON   • ENDOSCOPY N/A 8/30/2021    Procedure: ESOPHAGOGASTRODUODENOSCOPY WITH BIOPSY;  Surgeon: Norman Damon MD;  Location: [facility] ENDOSCOPY;  Service: Gastroenterology;  Laterality: N/A;  GASTRITIS/HIATAL HERNIA   • ENDOSCOPY N/A 9/27/2021    Procedure: ESOPHAGOGASTRODUODENOSCOPY with biopsies;  Surgeon: Norman Damon MD;  Location: [facility] ENDOSCOPY;  Service: Gastroenterology;  Laterality: N/A;  GASTRITIS, HIATAL HERNIA   • HYSTERECTOMY     • MASTECTOMY     • MASTECTOMY Right 05/2015        Social History     Tobacco Use   • Smoking status: Never Smoker   • Smokeless tobacco: Never Used   Vaping Use   • Vaping Use: Never used   Substance Use Topics   • Alcohol use: Never   • Drug use: Never       Family History   Problem Relation Age of Onset   • Lymphoma Mother    • Breast cancer Mother    • Lymphoma Maternal Uncle    • Diabetes Maternal Uncle    • Heart disease Other    • Diabetes Paternal Uncle    • Lymphoma Other    • Stroke Other    • Colon cancer Neg Hx    • Malig Hyperthermia Neg Hx         There are no preventive care reminders to display for this patient.     Current Outpatient Medications on File Prior to Visit   Medication Sig   • aspirin 81 MG EC tablet Take 81 mg by mouth Daily.   • Calcium Carb-Cholecalciferol (Calcium 600+D3) 600-200 MG-UNIT tablet Take  by mouth Daily.   • docusate sodium (Stool Softener) 100 MG capsule Take 100 mg by mouth Daily.   • ferrous sulfate 325 (65 FE) MG tablet Take 325 mg by mouth Daily With Breakfast.   • lisinopril (PRINIVIL,ZESTRIL) 20 MG tablet Take 1 tablet by mouth 2 (Two) Times a Day.   • loratadine (CLARITIN) 10 MG tablet loratadine 10 mg oral tablet take 1 tablet (10 mg) by oral route once daily   Active   • nystatin (MYCOSTATIN) 100,000 unit/mL suspension Swish and swallow 5 mL 4 (Four) Times a Day.   • [DISCONTINUED] amLODIPine (NORVASC) 5 MG tablet TAKE 1  "TABLET BY MOUTH DAILY   • [DISCONTINUED] PARoxetine (PAXIL) 20 MG tablet Take 1 tablet by mouth Every Morning.   • omeprazole (priLOSEC) 40 MG capsule Take 1 capsule by mouth Daily.     No current facility-administered medications on file prior to visit.       Immunization History   Administered Date(s) Administered   • COVID-19 (PFIZER) 02/26/2021, 03/19/2021, 11/05/2021   • Fluad Quad 65+ 10/06/2020   • Fluzone High-Dose 65+yrs 11/05/2021   • Hepatitis A 07/10/2018, 04/01/2019   • Influenza, Unspecified 10/26/2017, 10/03/2018   • Pneumococcal Conjugate 13-Valent (PCV13) 10/17/2016   • Pneumococcal Polysaccharide (PPSV23) 03/26/2018   • Shingrix 10/06/2020, 12/05/2020   • Tdap 01/01/2014, 09/15/2014   • Zostavax 01/01/2014       Review of Systems     Objective     /82   Pulse 93   Temp 97 °F (36.1 °C)   Ht 149.2 cm (58.75\")   Wt 58.1 kg (128 lb)   SpO2 95%   BMI 26.07 kg/m²       Physical Exam  Vitals reviewed.   Constitutional:       General: She is not in acute distress.     Appearance: Normal appearance. She is well-developed.   HENT:      Head: Normocephalic and atraumatic.   Eyes:      General: No scleral icterus.     Conjunctiva/sclera: Conjunctivae normal.   Cardiovascular:      Rate and Rhythm: Normal rate and regular rhythm.      Pulses: Normal pulses.      Heart sounds: No murmur heard.      Pulmonary:      Effort: Pulmonary effort is normal.      Breath sounds: Normal breath sounds. No wheezing, rhonchi or rales.   Musculoskeletal:         General: Normal range of motion.      Right lower leg: No edema.      Left lower leg: No edema.   Skin:     General: Skin is warm and dry.   Neurological:      Mental Status: She is alert and oriented to person, place, and time.   Psychiatric:         Mood and Affect: Mood and affect normal.         Behavior: Behavior normal.         Thought Content: Thought content normal.         Judgment: Judgment normal.         Result Review :     The following data " was reviewed by: ORLIN Denson on 01/13/2022:    CMP    CMP 9/7/21 9/15/21 9/15/21 12/15/21     0942 0942    Glucose 92 157 (A)  124 (A)   BUN 18 18  16   Creatinine 0.91 0.94  0.91   eGFR Non African Am 60 (A) 57 (A)  60 (A)   Sodium 134 (A) 135 (A)  130 (A)   Potassium 4.2 4.1  4.2   Chloride 97 (A) 99  92 (A)   Calcium 9.7 9.8  9.5   Total Protein   7.3    Albumin 4.20 4.10 3.7    Globulin   3.6    Total Bilirubin 0.3 0.4     Alkaline Phosphatase 114 114     AST (SGOT) 20 25     ALT (SGPT) 9 12     (A) Abnormal value            CBC    CBC 6/18/21 7/26/21 9/15/21   WBC 6.50 8.30 5.59   RBC 3.54 (A) 3.80 3.90   Hemoglobin 11.2 (A) 11.9 (A) 12.2   Hematocrit 33.6 (A) 36.2 37.6   MCV 94.9 95.3 96.4   MCH 31.6 31.3 31.3   MCHC 33.3 32.9 32.4   RDW 13.4 12.2 (A) 12.9   Platelets 267 311 289   (A) Abnormal value            TSH    TSH 7/26/21   TSH 2.940           Catecholamines, Fractionated, Plasma (12/15/2021 14:48)    Data reviewed: Radiologic studies :   CT Abdomen Pelvis With Contrast (01/04/2022 15:52)           Assessment and Plan      Diagnoses and all orders for this visit:    1. Essential hypertension (Primary)  -     amLODIPine (NORVASC) 5 MG tablet; Take 1 tablet by mouth Daily.  Dispense: 90 tablet; Refill: 0    2. Gastroesophageal reflux disease, unspecified whether esophagitis present  -     famotidine (Pepcid) 20 MG tablet; Take 1 tablet by mouth 2 (Two) Times a Day As Needed for Heartburn.  Dispense: 60 tablet; Refill: 0    3. Bacterial infection due to H. pylori    4. Anxiety and depression  -     PARoxetine (PAXIL) 20 MG tablet; Take 1 tablet by mouth Every Morning.  Dispense: 90 tablet; Refill: 1            Follow Up     Return in about 2 months (around 3/21/2022) for Recheck.     For now, she is going to continue lisinopril and amlodipine for her blood pressure.  Advised patient that if she has low readings and continues to have orthostatic symptoms, then I want her to reduce her  amlodipine to 1/2 tablet daily.  She will call me if this should occur.    She will have her EGD on Monday of next week to follow-up on abnormal imaging.  She is unable to take her PPI due to H. pylori therapy and needing to retest.  Advised patient that she will most likely have a stomach biopsy and not need to do her stool antigen testing, but I would leave that up to gastroenterology.  I will give her Pepcid to use in the meantime for her heartburn and reflux.    Her depression symptoms are stable at this time and she does not wish to adjust her Paxil.  She will notify me if this changes.    Patient was given instructions and counseling regarding her condition or for health maintenance advice. Please see specific information pulled into the AVS if appropriate.     Cristiane Bishop  reports that she has never smoked. She has never used smokeless tobacco.

## 2022-01-14 NOTE — PRE-PROCEDURE INSTRUCTIONS
Pt stated she was just here 1 month ago for the same procedure instructed nothing to eat or drink after midnight on Sunday she can take her norvasc in the am with a sip of water arrival time is 0800 am on Monday 1/1722

## 2022-01-17 ENCOUNTER — ANESTHESIA EVENT (OUTPATIENT)
Dept: GASTROENTEROLOGY | Facility: HOSPITAL | Age: 82
End: 2022-01-17

## 2022-01-17 ENCOUNTER — ANESTHESIA (OUTPATIENT)
Dept: GASTROENTEROLOGY | Facility: HOSPITAL | Age: 82
End: 2022-01-17

## 2022-01-17 ENCOUNTER — HOSPITAL ENCOUNTER (OUTPATIENT)
Facility: HOSPITAL | Age: 82
Setting detail: HOSPITAL OUTPATIENT SURGERY
Discharge: HOME | End: 2022-01-17
Attending: INTERNAL MEDICINE | Admitting: INTERNAL MEDICINE

## 2022-01-17 VITALS
OXYGEN SATURATION: 98 % | HEART RATE: 71 BPM | HEIGHT: 59 IN | WEIGHT: 128.75 LBS | BODY MASS INDEX: 25.96 KG/M2 | RESPIRATION RATE: 14 BRPM | DIASTOLIC BLOOD PRESSURE: 67 MMHG | SYSTOLIC BLOOD PRESSURE: 153 MMHG | TEMPERATURE: 97.9 F

## 2022-01-17 DIAGNOSIS — R93.3 ABNORMAL CT SCAN, STOMACH: ICD-10-CM

## 2022-01-17 PROCEDURE — 43239 EGD BIOPSY SINGLE/MULTIPLE: CPT | Performed by: INTERNAL MEDICINE

## 2022-01-17 PROCEDURE — 88312 SPECIAL STAINS GROUP 1: CPT | Performed by: INTERNAL MEDICINE

## 2022-01-17 PROCEDURE — 25010000002 PROPOFOL 10 MG/ML EMULSION: Performed by: NURSE ANESTHETIST, CERTIFIED REGISTERED

## 2022-01-17 PROCEDURE — 88305 TISSUE EXAM BY PATHOLOGIST: CPT | Performed by: INTERNAL MEDICINE

## 2022-01-17 RX ORDER — PROPOFOL 10 MG/ML
VIAL (ML) INTRAVENOUS AS NEEDED
Status: DISCONTINUED | OUTPATIENT
Start: 2022-01-17 | End: 2022-01-17 | Stop reason: SURG

## 2022-01-17 RX ORDER — PANTOPRAZOLE SODIUM 40 MG/1
40 TABLET, DELAYED RELEASE ORAL DAILY
Qty: 30 TABLET | Refills: 5 | Status: SHIPPED | OUTPATIENT
Start: 2022-01-17 | End: 2022-10-12 | Stop reason: SDUPTHER

## 2022-01-17 RX ORDER — SODIUM CHLORIDE, SODIUM LACTATE, POTASSIUM CHLORIDE, CALCIUM CHLORIDE 600; 310; 30; 20 MG/100ML; MG/100ML; MG/100ML; MG/100ML
30 INJECTION, SOLUTION INTRAVENOUS CONTINUOUS
Status: DISCONTINUED | OUTPATIENT
Start: 2022-01-17 | End: 2022-01-17 | Stop reason: HOSPADM

## 2022-01-17 RX ORDER — LIDOCAINE HYDROCHLORIDE 20 MG/ML
INJECTION, SOLUTION INFILTRATION; PERINEURAL AS NEEDED
Status: DISCONTINUED | OUTPATIENT
Start: 2022-01-17 | End: 2022-01-17 | Stop reason: SURG

## 2022-01-17 RX ADMIN — LIDOCAINE HYDROCHLORIDE 80 MG: 20 INJECTION, SOLUTION INFILTRATION; PERINEURAL at 08:38

## 2022-01-17 RX ADMIN — PROPOFOL 20 MG: 10 INJECTION, EMULSION INTRAVENOUS at 08:38

## 2022-01-17 RX ADMIN — SODIUM CHLORIDE, POTASSIUM CHLORIDE, SODIUM LACTATE AND CALCIUM CHLORIDE 30 ML/HR: 600; 310; 30; 20 INJECTION, SOLUTION INTRAVENOUS at 08:29

## 2022-01-17 RX ADMIN — PROPOFOL 150 MCG/KG/MIN: 10 INJECTION, EMULSION INTRAVENOUS at 08:38

## 2022-01-17 NOTE — ANESTHESIA PREPROCEDURE EVALUATION
Anesthesia Evaluation     Patient summary reviewed and Nursing notes reviewed   no history of anesthetic complications:  NPO Solid Status: > 8 hours  NPO Liquid Status: > 2 hours           Airway   Mallampati: II  TM distance: >3 FB  Neck ROM: full  No difficulty expected  Dental    (+) poor dentition    Pulmonary - negative pulmonary ROS and normal exam    breath sounds clear to auscultation  Cardiovascular - normal exam  Exercise tolerance: good (4-7 METS)    Rhythm: regular  Rate: normal    (+) hypertension,       Neuro/Psych  (+) psychiatric history Depression,     GI/Hepatic/Renal/Endo    (+)  GERD,      Musculoskeletal (-) negative ROS    Abdominal    Substance History - negative use     OB/GYN negative ob/gyn ROS         Other      history of cancer                    Anesthesia Plan    ASA 2     general and MAC   (Patient understands anesthesia not responsible for dental damage.)  intravenous induction     Anesthetic plan, all risks, benefits, and alternatives have been provided, discussed and informed consent has been obtained with: patient.  Use of blood products discussed with patient .   Plan discussed with CRNA.

## 2022-01-17 NOTE — H&P
Pre Procedure History & Physical    Chief Complaint:   Abnormal CT scan of the stomach    Subjective     HPI:   Abnormal CT of the stomach    Past Medical History:   Past Medical History:   Diagnosis Date   • Allergic rhinitis    • Breast cancer (HCC)     RIGHT   • GERD (gastroesophageal reflux disease)    • HTN (hypertension)    • Hypertension    • Ingrown toenail    • Mood disorder (HCC)        Past Surgical History:  Past Surgical History:   Procedure Laterality Date   • ABDOMINAL HYSTERECTOMY     • APPENDECTOMY     • BREAST BIOPSY Right    • COLONOSCOPY N/A 8/30/2021    Procedure: COLONOSCOPY;  Surgeon: Norman Damon MD;  Location: [facility] ENDOSCOPY;  Service: Gastroenterology;  Laterality: N/A;  NORMAL COLON   • ENDOSCOPY N/A 8/30/2021    Procedure: ESOPHAGOGASTRODUODENOSCOPY WITH BIOPSY;  Surgeon: Norman Damon MD;  Location: [facility] ENDOSCOPY;  Service: Gastroenterology;  Laterality: N/A;  GASTRITIS/HIATAL HERNIA   • ENDOSCOPY N/A 9/27/2021    Procedure: ESOPHAGOGASTRODUODENOSCOPY with biopsies;  Surgeon: Norman Damon MD;  Location: [facility] ENDOSCOPY;  Service: Gastroenterology;  Laterality: N/A;  GASTRITIS, HIATAL HERNIA   • HYSTERECTOMY     • MASTECTOMY     • MASTECTOMY Right 05/2015       Family History:  Family History   Problem Relation Age of Onset   • Lymphoma Mother    • Breast cancer Mother    • Cancer Mother    • Lymphoma Maternal Uncle    • Diabetes Maternal Uncle    • Heart disease Other    • Diabetes Paternal Uncle    • Lymphoma Other    • Stroke Other    • Colon cancer Neg Hx    • Malig Hyperthermia Neg Hx        Social History:   reports that she has never smoked. She has never used smokeless tobacco. She reports that she does not drink alcohol and does not use drugs.    Medications:   Medications Prior to Admission   Medication Sig Dispense Refill Last Dose   • amLODIPine (NORVASC) 5 MG tablet Take 1 tablet by mouth Daily. 90 tablet 0    • aspirin 81 MG EC tablet Take 81 mg  "by mouth Daily.      • Calcium Carb-Cholecalciferol (Calcium 600+D3) 600-200 MG-UNIT tablet Take 1 tablet by mouth Daily. Last dose 1 month ago      • docusate sodium (Stool Softener) 100 MG capsule Take 100 mg by mouth Every Night.      • famotidine (PEPCID) 20 MG tablet TAKE 1 TABLET BY MOUTH TWICE DAILY AS NEEDED FOR HEARTBURN (Patient taking differently: Take 20 mg by mouth 2 (Two) Times a Day. Last dose  1 week ago) 180 tablet 0    • ferrous sulfate 325 (65 FE) MG tablet Take 325 mg by mouth Daily With Breakfast.      • lisinopril (PRINIVIL,ZESTRIL) 20 MG tablet Take 1 tablet by mouth 2 (Two) Times a Day. (Patient taking differently: Take 20 mg by mouth 2 (Two) Times a Day. Hold Monday am) 180 tablet 0    • loratadine (CLARITIN) 10 MG tablet Take 10 mg by mouth Every Night.      • omeprazole (priLOSEC) 40 MG capsule Take 1 capsule by mouth Daily. (Patient taking differently: Take 40 mg by mouth Daily. Last dose 2 weeks ago) 90 capsule 0    • PARoxetine (PAXIL) 20 MG tablet Take 1 tablet by mouth Every Morning. (Patient taking differently: Take 20 mg by mouth Every Night.) 90 tablet 1        Allergies:  Tetracycline, Penicillins, and Sulfa antibiotics        Objective     Blood pressure 153/62, pulse 75, temperature 97.8 °F (36.6 °C), temperature source Temporal, resp. rate 16, height 149.2 cm (58.74\"), weight 58.4 kg (128 lb 12 oz), SpO2 99 %.    Physical Exam   Constitutional: Pt is oriented to person, place, and time and well-developed, well-nourished, and in no distress.   Mouth/Throat: Oropharynx is clear and moist.   Neck: Normal range of motion.   Cardiovascular: Normal rate, regular rhythm and normal heart sounds.    Pulmonary/Chest: Effort normal and breath sounds normal.   Abdominal: Soft. Nontender  Skin: Skin is warm and dry.   Psychiatric: Mood, memory, affect and judgment normal.     Assessment/Plan     Diagnosis:  Abnormal CT of the stomach    Anticipated Surgical Procedure:  EGD    The risks, " benefits, and alternatives of this procedure have been discussed with the patient or the responsible party- the patient understands and agrees to proceed.

## 2022-01-17 NOTE — ANESTHESIA POSTPROCEDURE EVALUATION
Patient: Cristiane Bishop    Procedure Summary     Date: 01/17/22 Room / Location: [facility] ENDOSCOPY 4 / [facility] END[facility]COPY    Anesthesia Start: 0837 Anesthesia Stop: 0853    Procedure: ESOPHAGOGASTRODUODENOSCOPY WITH BX (N/A ) Diagnosis:       Abnormal CT scan, stomach      (Abnormal CT scan, stomach [R93.3])    Surgeons: Norman Damon MD Provider: Kristin Calderon MD    Anesthesia Type: general, MAC ASA Status: 2          Anesthesia Type: general, MAC    Vitals  Vitals Value Taken Time   /67 01/17/22 0918   Temp 36.6 °C (97.9 °F) 01/17/22 0918   Pulse 71 01/17/22 0918   Resp 14 01/17/22 0918   SpO2 98 % 01/17/22 0918           Post Anesthesia Care and Evaluation    Patient location during evaluation: bedside  Patient participation: complete - patient participated  Level of consciousness: awake  Pain score: 0  Pain management: adequate  Airway patency: patent  Anesthetic complications: No anesthetic complications  PONV Status: none  Cardiovascular status: acceptable and stable  Respiratory status: acceptable and room air  Hydration status: acceptable    Comments: An Anesthesiologist personally participated in the most demanding procedures (including induction and emergence if applicable) in the anesthesia plan, monitored the course of anesthesia administration at frequent intervals and remained physically present and available for immediate diagnosis and treatment of emergencies.

## 2022-01-18 LAB
CYTO UR: NORMAL
LAB AP CASE REPORT: NORMAL
LAB AP CLINICAL INFORMATION: NORMAL
LAB AP SPECIAL STAINS: NORMAL
PATH REPORT.FINAL DX SPEC: NORMAL
PATH REPORT.GROSS SPEC: NORMAL

## 2022-02-14 ENCOUNTER — TELEPHONE (OUTPATIENT)
Dept: GASTROENTEROLOGY | Facility: CLINIC | Age: 82
End: 2022-02-14

## 2022-02-14 NOTE — TELEPHONE ENCOUNTER
LATE ENTRY:  I had rec'd order from Dr. Damon that this pt needed an outpatient Colonoscopy for left sided pain. However, when I s/w pt, she states her pain had resolved and she really does not wish to proceed with Colonoscopy. Pt agreed to call me if her sx return so I can get her scheduled.

## 2022-03-01 ENCOUNTER — LAB (OUTPATIENT)
Dept: LAB | Facility: HOSPITAL | Age: 82
End: 2022-03-01

## 2022-03-01 LAB — H. PYLORI ANTIGEN STOOL: NEGATIVE

## 2022-03-01 PROCEDURE — 87338 HPYLORI STOOL AG IA: CPT | Performed by: INTERNAL MEDICINE

## 2022-03-02 DIAGNOSIS — K21.9 GASTROESOPHAGEAL REFLUX DISEASE, UNSPECIFIED WHETHER ESOPHAGITIS PRESENT: ICD-10-CM

## 2022-03-02 DIAGNOSIS — I10 ESSENTIAL HYPERTENSION: ICD-10-CM

## 2022-03-02 RX ORDER — LISINOPRIL 20 MG/1
TABLET ORAL
Qty: 180 TABLET | Refills: 0 | Status: SHIPPED | OUTPATIENT
Start: 2022-03-02 | End: 2022-05-18 | Stop reason: SDUPTHER

## 2022-03-02 RX ORDER — FAMOTIDINE 20 MG/1
TABLET, FILM COATED ORAL
Qty: 180 TABLET | Refills: 0 | Status: SHIPPED | OUTPATIENT
Start: 2022-03-02 | End: 2022-05-18

## 2022-03-02 RX ORDER — FAMOTIDINE 20 MG/1
20 TABLET, FILM COATED ORAL DAILY
COMMUNITY
Start: 2022-02-24 | End: 2022-05-18

## 2022-03-16 ENCOUNTER — TELEPHONE (OUTPATIENT)
Dept: ONCOLOGY | Facility: HOSPITAL | Age: 82
End: 2022-03-16

## 2022-03-16 NOTE — TELEPHONE ENCOUNTER
Returned patient call and informed her that she does have labs ordered before her next appt and she can have them done in [place]. Patient verbalized understanding.

## 2022-03-16 NOTE — TELEPHONE ENCOUNTER
Caller: HAMILTON SAUER    Relationship: SELF    Best call back number: [phone]    What is the best time to reach you: ANY    Who are you requesting to speak with (clinical staff, provider,  specific staff member): CLINICAL STAFF    What was the call regarding: PT IS CALLING TO CHECK IF MINDI ORDAZ WANTED HER TO HAVE LAB WORK DONE PRIOR TO HER APPT ON 03/31/22. IF SO PT ASKS IF SHE CAN HAVE THIS DONE AT THE OFFICE IN [facility]    Do you require a callback: YES

## 2022-03-28 ENCOUNTER — LAB (OUTPATIENT)
Dept: LAB | Facility: HOSPITAL | Age: 82
End: 2022-03-28

## 2022-03-28 DIAGNOSIS — D64.9 ANEMIA, UNSPECIFIED TYPE: ICD-10-CM

## 2022-03-28 LAB
BASOPHILS # BLD AUTO: 0.03 10*3/MM3 (ref 0–0.2)
BASOPHILS NFR BLD AUTO: 0.5 % (ref 0–1.5)
DEPRECATED RDW RBC AUTO: 45.2 FL (ref 37–54)
EOSINOPHIL # BLD AUTO: 0.27 10*3/MM3 (ref 0–0.4)
EOSINOPHIL NFR BLD AUTO: 4.5 % (ref 0.3–6.2)
ERYTHROCYTE [DISTWIDTH] IN BLOOD BY AUTOMATED COUNT: 12.8 % (ref 12.3–15.4)
FERRITIN SERPL-MCNC: 127.9 NG/ML (ref 13–150)
HCT VFR BLD AUTO: 35.9 % (ref 34–46.6)
HGB BLD-MCNC: 11.6 G/DL (ref 12–15.9)
IMM GRANULOCYTES # BLD AUTO: 0.02 10*3/MM3 (ref 0–0.05)
IMM GRANULOCYTES NFR BLD AUTO: 0.3 % (ref 0–0.5)
IRON 24H UR-MRATE: 126 MCG/DL (ref 37–145)
IRON SATN MFR SERPL: 33 % (ref 20–50)
LYMPHOCYTES # BLD AUTO: 1.84 10*3/MM3 (ref 0.7–3.1)
LYMPHOCYTES NFR BLD AUTO: 30.7 % (ref 19.6–45.3)
MCH RBC QN AUTO: 31.4 PG (ref 26.6–33)
MCHC RBC AUTO-ENTMCNC: 32.3 G/DL (ref 31.5–35.7)
MCV RBC AUTO: 97 FL (ref 79–97)
MONOCYTES # BLD AUTO: 0.74 10*3/MM3 (ref 0.1–0.9)
MONOCYTES NFR BLD AUTO: 12.4 % (ref 5–12)
NEUTROPHILS NFR BLD AUTO: 3.09 10*3/MM3 (ref 1.7–7)
NEUTROPHILS NFR BLD AUTO: 51.6 % (ref 42.7–76)
NRBC BLD AUTO-RTO: 0.2 /100 WBC (ref 0–0.2)
PLATELET # BLD AUTO: 299 10*3/MM3 (ref 140–450)
PMV BLD AUTO: 9.8 FL (ref 6–12)
RBC # BLD AUTO: 3.7 10*6/MM3 (ref 3.77–5.28)
TIBC SERPL-MCNC: 377 MCG/DL (ref 298–536)
TRANSFERRIN SERPL-MCNC: 253 MG/DL (ref 200–360)
WBC NRBC COR # BLD: 5.99 10*3/MM3 (ref 3.4–10.8)

## 2022-03-28 PROCEDURE — 84466 ASSAY OF TRANSFERRIN: CPT

## 2022-03-28 PROCEDURE — 85025 COMPLETE CBC W/AUTO DIFF WBC: CPT

## 2022-03-28 PROCEDURE — 82728 ASSAY OF FERRITIN: CPT

## 2022-03-28 PROCEDURE — 83540 ASSAY OF IRON: CPT

## 2022-03-28 PROCEDURE — 36415 COLL VENOUS BLD VENIPUNCTURE: CPT

## 2022-03-31 ENCOUNTER — OFFICE VISIT (OUTPATIENT)
Dept: ONCOLOGY | Facility: HOSPITAL | Age: 82
End: 2022-03-31

## 2022-03-31 VITALS
TEMPERATURE: 97.2 F | BODY MASS INDEX: 26.9 KG/M2 | HEART RATE: 80 BPM | DIASTOLIC BLOOD PRESSURE: 72 MMHG | RESPIRATION RATE: 18 BRPM | WEIGHT: 132 LBS | OXYGEN SATURATION: 98 % | SYSTOLIC BLOOD PRESSURE: 150 MMHG

## 2022-03-31 DIAGNOSIS — Z78.0 POSTMENOPAUSAL: Primary | ICD-10-CM

## 2022-03-31 DIAGNOSIS — M85.80 OSTEOPENIA, UNSPECIFIED LOCATION: ICD-10-CM

## 2022-03-31 DIAGNOSIS — Z12.31 ENCOUNTER FOR SCREENING MAMMOGRAM FOR MALIGNANT NEOPLASM OF BREAST: ICD-10-CM

## 2022-03-31 DIAGNOSIS — C50.919 MALIGNANT NEOPLASM OF FEMALE BREAST, UNSPECIFIED ESTROGEN RECEPTOR STATUS, UNSPECIFIED LATERALITY, UNSPECIFIED SITE OF BREAST: ICD-10-CM

## 2022-03-31 PROCEDURE — G0463 HOSPITAL OUTPT CLINIC VISIT: HCPCS | Performed by: NURSE PRACTITIONER

## 2022-03-31 PROCEDURE — 99213 OFFICE O/P EST LOW 20 MIN: CPT | Performed by: NURSE PRACTITIONER

## 2022-03-31 NOTE — PROGRESS NOTES
Chief Complaint  Breast Cancer    VesselsAzalea, A*  Kaylen, Azalea SIMPSON., APRN      Subjective          Cristiane Bishop presents to [facility] for breast cancer follow up.     History of Present Illness     Ms. Cristiane Bishop presents for 6 month follow up for breast cancer. Stage 1 right breast cancer and completed right mastectomy. Completed 5 years of endocrine therapy with Letrozole in 6/15/2021. Last mammogram done in August of last year is benign.     She is due for left mammogram screening in September.     She is due for bone density now but will wait to do in September as the same time as mammogram.     She offers no complaints today. She reports she is walking a 1 mile a day.     Cancer Staging  Breast cancer in female (HCC)  Staging form: Breast, AJCC 8th Edition  - Clinical: Stage IA (cT1, cN0, cM0, G1, ER+, VT+, HER2-) - Signed by Tate Humphries MD on 6/18/2021       Treatment intent: curative    Oncology/Hematology History Overview Note   HEME/ONC DX/RX/INVESTIGATIONS  DX:   R breast ca, stage I, ER/VT positive, HER2 negative, grade I (low risk).    Anemia undergoing medical evaluation. As of 6/18/2021, patient is anticipating GI consultation in the near future.     Hyperglobulinemia, hyponatremia.     RX:   Femara, started in 6/2015. DC on 6/15/2021.  Prolia Q6m. On 6/18/20201, patient declined to continue Prolia.    INVESTIGATIONS:   5/28/2015, pathology report, right mastectomy, invasive ductal carcinoma, greatest dimension 7 mm, grade 1, 3 sentinel lymph nodes negative, ER/VT positive, HER-2 negative.    8/28/2020, L Mammogram, OTONIEL.    8/28/2020, DEXA, CONCLUSION: Normal bone density lumbar spine. Osteopenia in the hips.Bone density in the femoral necks has decreased by 1.9 percent compared to 2018.      Breast cancer in female (HCC)   5/8/2015 Initial Diagnosis    Breast cancer in female (CMS/HCC)     6/18/2021 - 12/16/2021 Chemotherapy    OP  SUPPORTIVE Denosumab (Prolia) Q6M     6/18/2021 Cancer Staged    Staging form: Breast, AJCC 8th Edition  - Clinical: Stage IA (cT1, cN0, cM0, G1, ER+, WV+, HER2-) - Signed by Tate Humphries MD on 6/18/2021     Breast cancer (HCC) (Resolved)   6/17/2021 Initial Diagnosis    Breast cancer (CMS/HCC)     6/18/2021 Cancer Staged    Staging form: Breast, AJCC 8th Edition  - Clinical: Stage IA (cT1, cN0, cM0, G1, ER+, WV+, HER2-) - Signed by Tate Humphries MD on 6/18/2021         Review of Systems   Constitutional: Positive for fatigue. Negative for appetite change, diaphoresis, fever, unexpected weight gain and unexpected weight loss.   HENT: Negative for hearing loss, mouth sores, sore throat, swollen glands, trouble swallowing and voice change.    Eyes: Negative for blurred vision.   Respiratory: Negative for cough, shortness of breath and wheezing.    Cardiovascular: Negative for chest pain and palpitations.   Gastrointestinal: Negative for abdominal pain, blood in stool, constipation, diarrhea, nausea and vomiting.   Endocrine: Negative for cold intolerance and heat intolerance.   Genitourinary: Negative for difficulty urinating, dysuria, frequency, hematuria and urinary incontinence.   Musculoskeletal: Negative for arthralgias, back pain and myalgias.   Skin: Negative for rash, skin lesions and wound.   Neurological: Negative for dizziness, seizures, weakness, numbness and headache.   Hematological: Does not bruise/bleed easily.   Psychiatric/Behavioral: Negative for depressed mood. The patient is not nervous/anxious.    All other systems reviewed and are negative.      Current Outpatient Medications on File Prior to Visit   Medication Sig Dispense Refill   • amLODIPine (NORVASC) 5 MG tablet Take 1 tablet by mouth Daily. 90 tablet 0   • aspirin 81 MG EC tablet Take 81 mg by mouth Daily.     • Calcium Carb-Cholecalciferol (Calcium 600+D3) 600-200 MG-UNIT tablet Take 1 tablet by mouth Daily. Last dose 1 month ago     •  docusate sodium (COLACE) 100 MG capsule Take 100 mg by mouth Every Night.     • famotidine (PEPCID) 20 MG tablet TAKE 1 TABLET BY MOUTH TWICE DAILY AS NEEDED FOR HEARTBURN 180 tablet 0   • famotidine (PEPCID) 20 MG tablet Take 20 mg by mouth Daily.     • ferrous sulfate 325 (65 FE) MG tablet Take 325 mg by mouth Daily With Breakfast.     • lisinopril (PRINIVIL,ZESTRIL) 20 MG tablet TAKE 1 TABLET BY MOUTH TWICE DAILY 180 tablet 0   • loratadine (CLARITIN) 10 MG tablet Take 10 mg by mouth Every Night.     • pantoprazole (PROTONIX) 40 MG EC tablet Take 1 tablet by mouth Daily. 30 tablet 5   • PARoxetine (PAXIL) 20 MG tablet Take 1 tablet by mouth Every Morning. (Patient taking differently: Take 20 mg by mouth Every Night.) 90 tablet 1     No current facility-administered medications on file prior to visit.       Allergies   Allergen Reactions   • Tetracycline Nausea Only and Rash     Difficulty swallowing     • Penicillins Rash   • Sulfa Antibiotics Rash     Past Medical History:   Diagnosis Date   • Allergic rhinitis    • Breast cancer (HCC)     RIGHT   • GERD (gastroesophageal reflux disease)    • HTN (hypertension)    • Hypertension    • Ingrown toenail    • Mood disorder (HCC)      Past Surgical History:   Procedure Laterality Date   • ABDOMINAL HYSTERECTOMY     • APPENDECTOMY     • BREAST BIOPSY Right    • COLONOSCOPY N/A 8/30/2021    Procedure: COLONOSCOPY;  Surgeon: Norman Damon MD;  Location: [facility] ENDOSCOPY;  Service: Gastroenterology;  Laterality: N/A;  NORMAL COLON   • ENDOSCOPY N/A 8/30/2021    Procedure: ESOPHAGOGASTRODUODENOSCOPY WITH BIOPSY;  Surgeon: Norman Damon MD;  Location: [facility] ENDOSCOPY;  Service: Gastroenterology;  Laterality: N/A;  GASTRITIS/HIATAL HERNIA   • ENDOSCOPY N/A 9/27/2021    Procedure: ESOPHAGOGASTRODUODENOSCOPY with biopsies;  Surgeon: Norman Damon MD;  Location: [facility] ENDOSCOPY;  Service: Gastroenterology;  Laterality: N/A;  GASTRITIS, HIATAL HERNIA   •  ENDOSCOPY N/A 1/17/2022    Procedure: ESOPHAGOGASTRODUODENOSCOPY WITH BX;  Surgeon: Norman Damon MD;  Location: [facility] ENDOSCOPY;  Service: Gastroenterology;  Laterality: N/A;  HIATAL HERNIA, REFULX ESOPHAGITIS   • HYSTERECTOMY     • MASTECTOMY     • MASTECTOMY Right 05/2015     Social History     Socioeconomic History   • Marital status:    Tobacco Use   • Smoking status: Never Smoker   • Smokeless tobacco: Never Used   Vaping Use   • Vaping Use: Never used   Substance and Sexual Activity   • Alcohol use: Never   • Drug use: Never   • Sexual activity: Defer     Family History   Problem Relation Age of Onset   • Lymphoma Mother    • Breast cancer Mother    • Cancer Mother    • Lymphoma Maternal Uncle    • Diabetes Maternal Uncle    • Heart disease Other    • Diabetes Paternal Uncle    • Lymphoma Other    • Stroke Other    • Colon cancer Neg Hx    • Malig Hyperthermia Neg Hx      Immunization History   Administered Date(s) Administered   • COVID-19 (PFIZER) PURPLE CAP 02/26/2021, 03/19/2021, 11/05/2021   • Fluad Quad 65+ 10/06/2020   • Fluzone High-Dose 65+yrs 11/05/2021   • Hepatitis A 07/10/2018, 04/01/2019   • Influenza, Unspecified 10/26/2017, 10/03/2018   • Pneumococcal Conjugate 13-Valent (PCV13) 10/17/2016   • Pneumococcal Polysaccharide (PPSV23) 03/26/2018   • Shingrix 10/06/2020, 12/05/2020   • Tdap 01/01/2014, 09/15/2014   • Zostavax 01/01/2014       Objective   Physical Exam  Vitals and nursing note reviewed.   Constitutional:       Appearance: Normal appearance. She is normal weight.   HENT:      Nose: Nose normal.   Eyes:      Pupils: Pupils are equal, round, and reactive to light.   Cardiovascular:      Rate and Rhythm: Normal rate and regular rhythm.      Pulses: Normal pulses.      Heart sounds: Normal heart sounds.   Pulmonary:      Breath sounds: Normal breath sounds.   Abdominal:      Palpations: Abdomen is soft.   Musculoskeletal:         General: Normal range of motion.       Cervical back: Normal range of motion and neck supple.   Skin:     General: Skin is warm and dry.      Capillary Refill: Capillary refill takes less than 2 seconds.   Neurological:      General: No focal deficit present.      Mental Status: She is alert and oriented to person, place, and time.   Psychiatric:         Mood and Affect: Mood normal.         Behavior: Behavior normal.         Thought Content: Thought content normal.         Judgment: Judgment normal.       Breast exam: left breast, axillary area without any palpable lumps or masses present. Right mastectomy site with no lumps or masses present near or on the incision line.   Vitals:    03/31/22 1303   BP: 150/72   Pulse: 80   Resp: 18   Temp: 97.2 °F (36.2 °C)   SpO2: 98%   Weight: 59.9 kg (132 lb)   PainSc: 0-No pain               ECOG: (0) Fully Active - Able to Carry On All Pre-disease Performance Without Restriction  Fall Risk Assessment was completed, and patient is at low risk for falls.  PHQ-9 Total Score: 0       The patient is  experiencing fatigue. Fatigue score: 3    PT/OT Functional Screening: PT fx screen: No needs identified  Speech Functional Screening: Speech fx screen: No needs identified  Rehab to be ordered: Rehab to be ordered: No needs identified        Result Review :   The following data was reviewed by: ORLIN Mckeon on 03/31/2022:  Lab Results   Component Value Date    HGB 11.6 (L) 03/28/2022    HCT 35.9 03/28/2022    MCV 97.0 03/28/2022     03/28/2022    WBC 5.99 03/28/2022    NEUTROABS 3.09 03/28/2022    LYMPHSABS 1.84 03/28/2022    MONOSABS 0.74 03/28/2022    EOSABS 0.27 03/28/2022    BASOSABS 0.03 03/28/2022     Lab Results   Component Value Date    GLUCOSE 124 (H) 12/15/2021    BUN 16 12/15/2021    CREATININE 0.91 12/15/2021     (L) 12/15/2021    K 4.2 12/15/2021    CL 92 (L) 12/15/2021    CO2 28.6 12/15/2021    CALCIUM 9.5 12/15/2021    PROTEINTOT 7.4 09/15/2021    ALBUMIN 4.10 09/15/2021     ALBUMIN 3.7 09/15/2021    BILITOT 0.4 09/15/2021    ALKPHOS 114 09/15/2021    AST 25 09/15/2021    ALT 12 09/15/2021          Assessment and Plan    Diagnoses and all orders for this visit:    1. Postmenopausal (Primary)  -     DEXA Bone Density Axial; Future    2. Malignant neoplasm of female breast, unspecified estrogen receptor status, unspecified laterality, unspecified site of breast (HCC)  -     Mammo Screening Modified With Tomosynthesis Left With CAD; Future    3. Osteopenia, unspecified location  -     DEXA Bone Density Axial; Future    4. Encounter for screening mammogram for malignant neoplasm of breast  -     Mammo Screening Modified With Tomosynthesis Left With CAD; Future    Follow up in September after mammogram and bone density. After these are done, she can be seen yearly for mammogram to review.     She will call if she has questions or concerns.   Continue Calcium and Vitamin D supplementation.         Patient Follow Up: 6 months with NP.       Patient was given instructions and counseling regarding her condition or for health maintenance advice. Please see specific information pulled into the AVS if appropriate.     Collette Mann, APRN    3/31/2022

## 2022-04-25 ENCOUNTER — TELEPHONE (OUTPATIENT)
Dept: ONCOLOGY | Facility: HOSPITAL | Age: 82
End: 2022-04-25

## 2022-04-25 NOTE — TELEPHONE ENCOUNTER
Caller: Cristiane Bishop    Relationship: Self    Best call back number: [phone]    What is the best time to reach you: ANY       What was the call regarding: PATIENT CALLED STATED WE WERE SUPPOSED TO HAVE SCHEDULED A MAMMOGRAM FOR HER BUT SHE HAS NOT HEARD FROM ANYONE    Do you require a callback: YES

## 2022-05-18 ENCOUNTER — OFFICE VISIT (OUTPATIENT)
Dept: FAMILY MEDICINE CLINIC | Facility: CLINIC | Age: 82
End: 2022-05-18

## 2022-05-18 VITALS
HEART RATE: 87 BPM | WEIGHT: 129 LBS | DIASTOLIC BLOOD PRESSURE: 90 MMHG | BODY MASS INDEX: 27.08 KG/M2 | OXYGEN SATURATION: 98 % | SYSTOLIC BLOOD PRESSURE: 152 MMHG | TEMPERATURE: 97 F | HEIGHT: 58 IN

## 2022-05-18 DIAGNOSIS — R82.5 ELEVATED URINE LEVELS OF CATECHOLAMINES: ICD-10-CM

## 2022-05-18 DIAGNOSIS — K59.00 CONSTIPATION, UNSPECIFIED CONSTIPATION TYPE: ICD-10-CM

## 2022-05-18 DIAGNOSIS — R53.82 CHRONIC FATIGUE: ICD-10-CM

## 2022-05-18 DIAGNOSIS — F41.9 ANXIETY AND DEPRESSION: ICD-10-CM

## 2022-05-18 DIAGNOSIS — M85.852 OSTEOPENIA OF BOTH HIPS: ICD-10-CM

## 2022-05-18 DIAGNOSIS — F32.A ANXIETY AND DEPRESSION: ICD-10-CM

## 2022-05-18 DIAGNOSIS — M85.851 OSTEOPENIA OF BOTH HIPS: ICD-10-CM

## 2022-05-18 DIAGNOSIS — I10 WHITE COAT SYNDROME WITH DIAGNOSIS OF HYPERTENSION: Primary | ICD-10-CM

## 2022-05-18 DIAGNOSIS — R35.1 NOCTURIA MORE THAN TWICE PER NIGHT: ICD-10-CM

## 2022-05-18 LAB
25(OH)D3 SERPL-MCNC: 34.2 NG/ML (ref 30–100)
ALBUMIN SERPL-MCNC: 4.4 G/DL (ref 3.5–5.2)
ALBUMIN UR-MCNC: <1.2 MG/DL
ALBUMIN/GLOB SERPL: 1.3 G/DL
ALP SERPL-CCNC: 122 U/L (ref 39–117)
ALT SERPL W P-5'-P-CCNC: 13 U/L (ref 1–33)
ANION GAP SERPL CALCULATED.3IONS-SCNC: 10 MMOL/L (ref 5–15)
AST SERPL-CCNC: 21 U/L (ref 1–32)
BASOPHILS # BLD AUTO: 0.03 10*3/MM3 (ref 0–0.2)
BASOPHILS NFR BLD AUTO: 0.4 % (ref 0–1.5)
BILIRUB BLD-MCNC: NEGATIVE MG/DL
BILIRUB SERPL-MCNC: 0.7 MG/DL (ref 0–1.2)
BUN SERPL-MCNC: 19 MG/DL (ref 8–23)
BUN/CREAT SERPL: 20.9 (ref 7–25)
CALCIUM SPEC-SCNC: 10 MG/DL (ref 8.6–10.5)
CHLORIDE SERPL-SCNC: 97 MMOL/L (ref 98–107)
CLARITY, POC: CLEAR
CO2 SERPL-SCNC: 25 MMOL/L (ref 22–29)
COLOR UR: YELLOW
CREAT SERPL-MCNC: 0.91 MG/DL (ref 0.57–1)
CREAT UR-MCNC: 50 MG/DL
DEPRECATED RDW RBC AUTO: 42.5 FL (ref 37–54)
EGFRCR SERPLBLD CKD-EPI 2021: 64.3 ML/MIN/1.73
EOSINOPHIL # BLD AUTO: 0.21 10*3/MM3 (ref 0–0.4)
EOSINOPHIL NFR BLD AUTO: 3 % (ref 0.3–6.2)
ERYTHROCYTE [DISTWIDTH] IN BLOOD BY AUTOMATED COUNT: 12.3 % (ref 12.3–15.4)
EXPIRATION DATE: ABNORMAL
FOLATE SERPL-MCNC: 19.6 NG/ML (ref 4.78–24.2)
GLOBULIN UR ELPH-MCNC: 3.5 GM/DL
GLUCOSE SERPL-MCNC: 101 MG/DL (ref 65–99)
GLUCOSE UR STRIP-MCNC: NEGATIVE MG/DL
HCT VFR BLD AUTO: 36.9 % (ref 34–46.6)
HGB BLD-MCNC: 12 G/DL (ref 12–15.9)
IMM GRANULOCYTES # BLD AUTO: 0.02 10*3/MM3 (ref 0–0.05)
IMM GRANULOCYTES NFR BLD AUTO: 0.3 % (ref 0–0.5)
KETONES UR QL: NEGATIVE
LEUKOCYTE EST, POC: ABNORMAL
LYMPHOCYTES # BLD AUTO: 2.02 10*3/MM3 (ref 0.7–3.1)
LYMPHOCYTES NFR BLD AUTO: 28.8 % (ref 19.6–45.3)
Lab: ABNORMAL
MCH RBC QN AUTO: 31.3 PG (ref 26.6–33)
MCHC RBC AUTO-ENTMCNC: 32.5 G/DL (ref 31.5–35.7)
MCV RBC AUTO: 96.1 FL (ref 79–97)
MICROALBUMIN/CREAT UR: NORMAL MG/G{CREAT}
MONOCYTES # BLD AUTO: 0.72 10*3/MM3 (ref 0.1–0.9)
MONOCYTES NFR BLD AUTO: 10.3 % (ref 5–12)
NEUTROPHILS NFR BLD AUTO: 4.02 10*3/MM3 (ref 1.7–7)
NEUTROPHILS NFR BLD AUTO: 57.2 % (ref 42.7–76)
NITRITE UR-MCNC: NEGATIVE MG/ML
NRBC BLD AUTO-RTO: 0.1 /100 WBC (ref 0–0.2)
PH UR: 7 [PH] (ref 5–8)
PLATELET # BLD AUTO: 301 10*3/MM3 (ref 140–450)
PMV BLD AUTO: 9.7 FL (ref 6–12)
POTASSIUM SERPL-SCNC: 5 MMOL/L (ref 3.5–5.2)
PROT SERPL-MCNC: 7.9 G/DL (ref 6–8.5)
PROT UR STRIP-MCNC: NEGATIVE MG/DL
RBC # BLD AUTO: 3.84 10*6/MM3 (ref 3.77–5.28)
RBC # UR STRIP: NEGATIVE /UL
SODIUM SERPL-SCNC: 132 MMOL/L (ref 136–145)
SP GR UR: 1.01 (ref 1–1.03)
T4 FREE SERPL-MCNC: 1.01 NG/DL (ref 0.93–1.7)
TSH SERPL DL<=0.05 MIU/L-ACNC: 3.82 UIU/ML (ref 0.27–4.2)
UROBILINOGEN UR QL: NORMAL
VIT B12 BLD-MCNC: 478 PG/ML (ref 211–946)
WBC NRBC COR # BLD: 7.02 10*3/MM3 (ref 3.4–10.8)

## 2022-05-18 PROCEDURE — 87086 URINE CULTURE/COLONY COUNT: CPT | Performed by: NURSE PRACTITIONER

## 2022-05-18 PROCEDURE — 82607 VITAMIN B-12: CPT | Performed by: NURSE PRACTITIONER

## 2022-05-18 PROCEDURE — 82570 ASSAY OF URINE CREATININE: CPT | Performed by: NURSE PRACTITIONER

## 2022-05-18 PROCEDURE — 84443 ASSAY THYROID STIM HORMONE: CPT | Performed by: NURSE PRACTITIONER

## 2022-05-18 PROCEDURE — 82384 ASSAY THREE CATECHOLAMINES: CPT | Performed by: NURSE PRACTITIONER

## 2022-05-18 PROCEDURE — 80053 COMPREHEN METABOLIC PANEL: CPT | Performed by: NURSE PRACTITIONER

## 2022-05-18 PROCEDURE — 85025 COMPLETE CBC W/AUTO DIFF WBC: CPT | Performed by: NURSE PRACTITIONER

## 2022-05-18 PROCEDURE — 82043 UR ALBUMIN QUANTITATIVE: CPT | Performed by: NURSE PRACTITIONER

## 2022-05-18 PROCEDURE — 99214 OFFICE O/P EST MOD 30 MIN: CPT | Performed by: NURSE PRACTITIONER

## 2022-05-18 PROCEDURE — 36415 COLL VENOUS BLD VENIPUNCTURE: CPT | Performed by: NURSE PRACTITIONER

## 2022-05-18 PROCEDURE — 82746 ASSAY OF FOLIC ACID SERUM: CPT | Performed by: NURSE PRACTITIONER

## 2022-05-18 PROCEDURE — 81003 URINALYSIS AUTO W/O SCOPE: CPT | Performed by: NURSE PRACTITIONER

## 2022-05-18 PROCEDURE — 84439 ASSAY OF FREE THYROXINE: CPT | Performed by: NURSE PRACTITIONER

## 2022-05-18 PROCEDURE — 82306 VITAMIN D 25 HYDROXY: CPT | Performed by: NURSE PRACTITIONER

## 2022-05-18 RX ORDER — LISINOPRIL 20 MG/1
20 TABLET ORAL 2 TIMES DAILY
Qty: 180 TABLET | Refills: 1 | Status: SHIPPED | OUTPATIENT
Start: 2022-05-18 | End: 2022-10-07

## 2022-05-18 RX ORDER — AMLODIPINE BESYLATE 5 MG/1
5 TABLET ORAL DAILY
Qty: 90 TABLET | Refills: 1 | Status: SHIPPED | OUTPATIENT
Start: 2022-05-18 | End: 2022-10-12 | Stop reason: SDUPTHER

## 2022-05-18 RX ORDER — POLYETHYLENE GLYCOL 3350 17 G/17G
17 POWDER, FOR SOLUTION ORAL DAILY
Qty: 507 G | Refills: 0 | Status: SHIPPED | OUTPATIENT
Start: 2022-05-18 | End: 2022-10-12

## 2022-05-18 RX ORDER — PAROXETINE HYDROCHLORIDE 20 MG/1
20 TABLET, FILM COATED ORAL NIGHTLY
Qty: 90 TABLET | Refills: 1 | Status: SHIPPED | OUTPATIENT
Start: 2022-05-18 | End: 2022-10-12 | Stop reason: SDUPTHER

## 2022-05-18 NOTE — PROGRESS NOTES
Chief Complaint  Fatigue (Feels low on energy most of the time. )    Jolie Bishop presents to [facility]  History of Present Illness     Patient presents to the office today for medication refills and fasting labs.  She would also like to see about getting a B12 shot for fatigue.    She states that she has been fatigued often.  She states that her sleep is not good, but it never really has been.  In the past month she has been getting up at least 3 times in the middle of the night to go to the bathroom.  She denies any gross hematuria.  Denies any burning or urgency.  Denies any lower back pain.  She denies fever, chills, or body aches.  Denies any nausea, vomiting, or abdominal pain.    She is currently prescribed Paxil 20 mg daily for treatment of anxiety and depression.  She feels like her symptoms are currently stable.  She denies any homicidal ideations or suicidal ideations.  She denies any AVH.  Her birthday is on [birth year].  She will be 82 years old.  Her only request was that her children go to [facility] with her this weekend.  She states that she would much rather see all of her children with her at [facility] before she dies, then after the fact.    Her blood pressure is elevated on exam today, 152/90; however, she does have whitecoat syndrome.  Her blood pressure at home has been normal.  She has had readings 105/60, 119/63, and this morning her blood pressure was 124/72.  She currently denies any chest pain, palpitations, headaches, dizziness, or shortness of breath.  She denies any swelling in her legs.    She is still awaiting her appointment to see Dr. Sanders for elevated catecholamines.  Catecholamines were first noted at last fall/winter.  Her norepinephrine was elevated at 1010.  Epinephrine and dopamine were normal.  She had a CT scan of the abdomen and pelvis with contrast which showed a small hiatal hernia, nonspecific 3-1/2 cm area of  fullness in the gastric cardia, fatty liver, and mild sigmoid diverticulosis without diverticulitis.  She is followed up with gastroenterology.  Most recently her H. pylori has been successfully treated.  EGD did not reveal any mass, but did reveal a medium size hiatal hernia, large grade C esophagitis, normal stomach and duodenum.    She would like a prescription for something for constipation.  She has been taking FiberCon and a stool softener over-the-counter but she would prefer to only have to take 1 thing.  She is up-to-date on her colonoscopy.  She had a colonoscopy August 30, 2021 and it was unremarkable.  She denies any rectal bleeding or blood in her stool.    Past Medical History:   Diagnosis Date   • Allergic rhinitis    • Breast cancer (HCC)     RIGHT   • GERD (gastroesophageal reflux disease)    • HTN (hypertension)    • Hypertension    • Ingrown toenail    • Mood disorder (HCC)        Allergies   Allergen Reactions   • Tetracycline Nausea Only and Rash     Difficulty swallowing     • Penicillins Rash   • Sulfa Antibiotics Rash        Past Surgical History:   Procedure Laterality Date   • ABDOMINAL HYSTERECTOMY     • APPENDECTOMY     • BREAST BIOPSY Right    • COLONOSCOPY N/A 8/30/2021    Procedure: COLONOSCOPY;  Surgeon: Norman Damon MD;  Location: [facility] ENDOSCOPY;  Service: Gastroenterology;  Laterality: N/A;  NORMAL COLON   • ENDOSCOPY N/A 8/30/2021    Procedure: ESOPHAGOGASTRODUODENOSCOPY WITH BIOPSY;  Surgeon: Norman Damon MD;  Location: [facility] ENDOSCOPY;  Service: Gastroenterology;  Laterality: N/A;  GASTRITIS/HIATAL HERNIA   • ENDOSCOPY N/A 9/27/2021    Procedure: ESOPHAGOGASTRODUODENOSCOPY with biopsies;  Surgeon: Norman Damon MD;  Location: [facility] ENDOSCOPY;  Service: Gastroenterology;  Laterality: N/A;  GASTRITIS, HIATAL HERNIA   • ENDOSCOPY N/A 1/17/2022    Procedure: ESOPHAGOGASTRODUODENOSCOPY WITH BX;  Surgeon: Norman Damon MD;  Location: [facility] ENDOSCOPY;   Service: Gastroenterology;  Laterality: N/A;  HIATAL HERNIA, REFULX ESOPHAGITIS   • HYSTERECTOMY     • MASTECTOMY     • MASTECTOMY Right 05/2015        Social History     Tobacco Use   • Smoking status: Never Smoker   • Smokeless tobacco: Never Used   Vaping Use   • Vaping Use: Never used   Substance Use Topics   • Alcohol use: Never   • Drug use: Never       Family History   Problem Relation Age of Onset   • Lymphoma Mother    • Breast cancer Mother    • Cancer Mother    • Lymphoma Maternal Uncle    • Diabetes Maternal Uncle    • Heart disease Other    • Diabetes Paternal Uncle    • Lymphoma Other    • Stroke Other    • Colon cancer Neg Hx    • Malig Hyperthermia Neg Hx         There are no preventive care reminders to display for this patient.     Current Outpatient Medications on File Prior to Visit   Medication Sig   • aspirin 81 MG EC tablet Take 81 mg by mouth Daily.   • Calcium Carb-Cholecalciferol (Calcium 600+D3) 600-200 MG-UNIT tablet Take 1 tablet by mouth Daily. Last dose 1 month ago   • docusate sodium (COLACE) 100 MG capsule Take 100 mg by mouth Every Night.   • loratadine (CLARITIN) 10 MG tablet Take 10 mg by mouth Every Night.   • pantoprazole (PROTONIX) 40 MG EC tablet Take 1 tablet by mouth Daily.   • [DISCONTINUED] amLODIPine (NORVASC) 5 MG tablet Take 1 tablet by mouth Daily.   • [DISCONTINUED] lisinopril (PRINIVIL,ZESTRIL) 20 MG tablet TAKE 1 TABLET BY MOUTH TWICE DAILY   • [DISCONTINUED] PARoxetine (PAXIL) 20 MG tablet Take 1 tablet by mouth Every Morning. (Patient taking differently: Take 20 mg by mouth Every Night.)   • [DISCONTINUED] famotidine (PEPCID) 20 MG tablet TAKE 1 TABLET BY MOUTH TWICE DAILY AS NEEDED FOR HEARTBURN   • [DISCONTINUED] famotidine (PEPCID) 20 MG tablet Take 20 mg by mouth Daily.   • [DISCONTINUED] ferrous sulfate 325 (65 FE) MG tablet Take 325 mg by mouth Daily With Breakfast.     No current facility-administered medications on file prior to visit.       Immunization  "History   Administered Date(s) Administered   • COVID-19 (PFIZER) PURPLE CAP 02/26/2021, 03/19/2021, 11/05/2021   • Fluad Quad 65+ 10/06/2020   • Fluzone High-Dose 65+yrs 11/05/2021   • Hepatitis A 07/10/2018, 04/01/2019   • Influenza, Unspecified 10/26/2017, 10/03/2018   • Pneumococcal Conjugate 13-Valent (PCV13) 10/17/2016   • Pneumococcal Polysaccharide (PPSV23) 03/26/2018   • Shingrix 10/06/2020, 12/05/2020   • Tdap 01/01/2014, 09/15/2014   • Zostavax 01/01/2014       Review of Systems     Objective     /90   Pulse 87   Temp 97 °F (36.1 °C)   Ht 147.3 cm (58\")   Wt 58.5 kg (129 lb)   SpO2 98%   BMI 26.96 kg/m²       Physical Exam  Vitals reviewed.   Constitutional:       General: She is not in acute distress.     Appearance: Normal appearance. She is well-developed.   HENT:      Head: Normocephalic and atraumatic.   Eyes:      General: No scleral icterus.     Extraocular Movements: Extraocular movements intact.      Conjunctiva/sclera: Conjunctivae normal.   Neck:      Thyroid: No thyroid mass, thyromegaly or thyroid tenderness.      Vascular: No carotid bruit.      Trachea: Trachea normal.   Cardiovascular:      Rate and Rhythm: Normal rate and regular rhythm.      Pulses: Normal pulses.      Heart sounds: No murmur heard.  Pulmonary:      Effort: Pulmonary effort is normal. No respiratory distress.      Breath sounds: Normal breath sounds. No wheezing, rhonchi or rales.   Abdominal:      General: Bowel sounds are normal. There is no distension.      Palpations: Abdomen is soft. There is no mass.      Tenderness: There is no abdominal tenderness. There is no guarding or rebound.   Musculoskeletal:         General: Normal range of motion.      Cervical back: Normal range of motion and neck supple.      Right lower leg: No edema.      Left lower leg: No edema.   Lymphadenopathy:      Cervical: No cervical adenopathy.   Skin:     General: Skin is warm and dry.   Neurological:      Mental Status: She is " alert and oriented to person, place, and time.   Psychiatric:         Mood and Affect: Mood and affect normal.         Behavior: Behavior normal.         Thought Content: Thought content normal.         Judgment: Judgment normal.         Result Review :     The following data was reviewed by: ORLIN Denson on 05/18/2022:    CMP    CMP 9/7/21 9/15/21 9/15/21 12/15/21     0942 0942    Glucose 92 157 (A)  124 (A)   BUN 18 18  16   Creatinine 0.91 0.94  0.91   eGFR Non African Am 60 (A) 57 (A)  60 (A)   Sodium 134 (A) 135 (A)  130 (A)   Potassium 4.2 4.1  4.2   Chloride 97 (A) 99  92 (A)   Calcium 9.7 9.8  9.5   Total Protein   7.3    Albumin 4.20 4.10 3.7    Globulin   3.6    Total Bilirubin 0.3 0.4     Alkaline Phosphatase 114 114     AST (SGOT) 20 25     ALT (SGPT) 9 12     (A) Abnormal value            CBC    CBC 7/26/21 9/15/21 3/28/22   WBC 8.30 5.59 5.99   RBC 3.80 3.90 3.70 (A)   Hemoglobin 11.9 (A) 12.2 11.6 (A)   Hematocrit 36.2 37.6 35.9   MCV 95.3 96.4 97.0   MCH 31.3 31.3 31.4   MCHC 32.9 32.4 32.3   RDW 12.2 (A) 12.9 12.8   Platelets 311 289 299   (A) Abnormal value            TSH    TSH 7/26/21   TSH 2.940           POCT urinalysis dipstick, automated (05/18/2022 09:44)    Data reviewed: GI studies :   COLONOSCOPY (08/30/2021 12:05)  UPPER GI ENDOSCOPY (09/27/2021 12:02)  UPPER GI ENDOSCOPY (01/17/2022 08:32)  CT Abdomen Pelvis With Contrast (01/04/2022 15:52)           Assessment and Plan      Diagnoses and all orders for this visit:    1. White coat syndrome with diagnosis of hypertension (Primary)  -     amLODIPine (NORVASC) 5 MG tablet; Take 1 tablet by mouth Daily.  Dispense: 90 tablet; Refill: 1  -     lisinopril (PRINIVIL,ZESTRIL) 20 MG tablet; Take 1 tablet by mouth 2 (Two) Times a Day.  Dispense: 180 tablet; Refill: 1  -     CBC Auto Differential  -     Comprehensive Metabolic Panel  -     TSH+Free T4  -     Microalbumin / Creatinine Urine Ratio - Urine, Clean Catch    2. Anxiety and  depression  -     PARoxetine (PAXIL) 20 MG tablet; Take 1 tablet by mouth Every Night.  Dispense: 90 tablet; Refill: 1    3. Chronic fatigue  -     Vitamin B12 & Folate    4. Nocturia more than twice per night  -     POCT urinalysis dipstick, automated  -     Urine Culture - Urine, Urine, Clean Catch    5. Elevated urine levels of catecholamines  -     Catecholamines, Fractionated, Plasma    6. Constipation, unspecified constipation type  -     polyethylene glycol (MIRALAX) 17 GM/SCOOP powder; Take 17 g by mouth Daily.  Dispense: 507 g; Refill: 0    7. Osteopenia of both hips  -     Vitamin D 25 Hydroxy            Follow Up     Return for follow up pending outcome of labs.     Medication refills as noted above.  We will check labs today, including B12 and folate and vitamin D and her catecholamines.  Her appointment with Dr. Sanders is not until June.    I will give her a trial of MiraLAX in place of FiberCon and stool softener.    Patient was given instructions and counseling regarding her condition or for health maintenance advice. Please see specific information pulled into the AVS if appropriate.     Cristiane Bishop  reports that she has never smoked. She has never used smokeless tobacco.   h/o PE/DVT; Xarelto on hold in setting of bleed-  will continue to hold pending GI rec

## 2022-05-18 NOTE — PROGRESS NOTES
Venipuncture Blood Specimen Collection  Venipuncture performed in left arm  by Patricia Morris with good hemostasis. Patient tolerated the procedure well without complications.   05/18/22   Patricia Morris

## 2022-05-21 LAB — BACTERIA SPEC AEROBE CULT: NO GROWTH

## 2022-05-23 LAB
DOPAMINE SERPL-MCNC: <30 PG/ML (ref 0–48)
EPINEPH PLAS-MCNC: 38 PG/ML (ref 0–62)
NOREPINEPH PLAS-MCNC: 921 PG/ML (ref 0–874)

## 2022-05-25 DIAGNOSIS — K21.9 GASTROESOPHAGEAL REFLUX DISEASE, UNSPECIFIED WHETHER ESOPHAGITIS PRESENT: ICD-10-CM

## 2022-05-25 RX ORDER — FAMOTIDINE 20 MG/1
TABLET, FILM COATED ORAL
Qty: 180 TABLET | Refills: 0 | OUTPATIENT
Start: 2022-05-25

## 2022-06-29 ENCOUNTER — TRANSCRIBE ORDERS (OUTPATIENT)
Dept: ADMINISTRATIVE | Facility: HOSPITAL | Age: 82
End: 2022-06-29

## 2022-06-29 DIAGNOSIS — R82.5 ELEVATED URINE LEVELS OF 17-KETOSTEROIDS: Primary | ICD-10-CM

## 2022-07-05 ENCOUNTER — APPOINTMENT (OUTPATIENT)
Dept: MRI IMAGING | Facility: HOSPITAL | Age: 82
End: 2022-07-05

## 2022-07-07 ENCOUNTER — HOSPITAL ENCOUNTER (OUTPATIENT)
Dept: CT IMAGING | Facility: HOSPITAL | Age: 82
Discharge: HOME | End: 2022-07-07
Admitting: INTERNAL MEDICINE

## 2022-07-07 DIAGNOSIS — R82.5 ELEVATED URINE LEVELS OF 17-KETOSTEROIDS: ICD-10-CM

## 2022-07-07 LAB
CREAT BLDA-MCNC: 0.8 MG/DL
EGFRCR SERPLBLD CKD-EPI 2021: 74.6 ML/MIN/1.73

## 2022-07-07 PROCEDURE — 0 IOPAMIDOL PER 1 ML: Performed by: INTERNAL MEDICINE

## 2022-07-07 PROCEDURE — 82565 ASSAY OF CREATININE: CPT

## 2022-07-07 PROCEDURE — 74170 CT ABD WO CNTRST FLWD CNTRST: CPT

## 2022-07-07 RX ADMIN — IOPAMIDOL 100 ML: 755 INJECTION, SOLUTION INTRAVENOUS at 12:10

## 2022-08-28 DIAGNOSIS — I10 WHITE COAT SYNDROME WITH DIAGNOSIS OF HYPERTENSION: ICD-10-CM

## 2022-08-29 RX ORDER — LISINOPRIL 20 MG/1
TABLET ORAL
Qty: 180 TABLET | Refills: 1 | OUTPATIENT
Start: 2022-08-29

## 2022-09-13 ENCOUNTER — HOSPITAL ENCOUNTER (OUTPATIENT)
Dept: BONE DENSITY | Facility: HOSPITAL | Age: 82
Discharge: HOME | End: 2022-09-13

## 2022-09-13 ENCOUNTER — HOSPITAL ENCOUNTER (OUTPATIENT)
Dept: MAMMOGRAPHY | Facility: HOSPITAL | Age: 82
Discharge: HOME | End: 2022-09-13

## 2022-09-13 DIAGNOSIS — Z12.31 ENCOUNTER FOR SCREENING MAMMOGRAM FOR MALIGNANT NEOPLASM OF BREAST: ICD-10-CM

## 2022-09-13 DIAGNOSIS — Z78.0 POSTMENOPAUSAL: ICD-10-CM

## 2022-09-13 DIAGNOSIS — C50.919 MALIGNANT NEOPLASM OF FEMALE BREAST, UNSPECIFIED ESTROGEN RECEPTOR STATUS, UNSPECIFIED LATERALITY, UNSPECIFIED SITE OF BREAST: ICD-10-CM

## 2022-09-13 DIAGNOSIS — M85.80 OSTEOPENIA, UNSPECIFIED LOCATION: ICD-10-CM

## 2022-09-13 PROCEDURE — 77063 BREAST TOMOSYNTHESIS BI: CPT

## 2022-09-13 PROCEDURE — 77067 SCR MAMMO BI INCL CAD: CPT

## 2022-09-13 PROCEDURE — 77080 DXA BONE DENSITY AXIAL: CPT

## 2022-09-20 ENCOUNTER — OFFICE VISIT (OUTPATIENT)
Dept: ONCOLOGY | Facility: HOSPITAL | Age: 82
End: 2022-09-20

## 2022-09-20 VITALS
HEART RATE: 79 BPM | WEIGHT: 127.43 LBS | OXYGEN SATURATION: 100 % | BODY MASS INDEX: 26.63 KG/M2 | DIASTOLIC BLOOD PRESSURE: 66 MMHG | TEMPERATURE: 97.5 F | SYSTOLIC BLOOD PRESSURE: 143 MMHG | RESPIRATION RATE: 18 BRPM

## 2022-09-20 DIAGNOSIS — C50.919 MALIGNANT NEOPLASM OF FEMALE BREAST, UNSPECIFIED ESTROGEN RECEPTOR STATUS, UNSPECIFIED LATERALITY, UNSPECIFIED SITE OF BREAST: ICD-10-CM

## 2022-09-20 DIAGNOSIS — Z12.31 ENCOUNTER FOR SCREENING MAMMOGRAM FOR MALIGNANT NEOPLASM OF BREAST: Primary | ICD-10-CM

## 2022-09-20 DIAGNOSIS — Z90.11 H/O RIGHT MASTECTOMY: ICD-10-CM

## 2022-09-20 PROCEDURE — 99214 OFFICE O/P EST MOD 30 MIN: CPT | Performed by: NURSE PRACTITIONER

## 2022-09-20 PROCEDURE — G0463 HOSPITAL OUTPT CLINIC VISIT: HCPCS | Performed by: NURSE PRACTITIONER

## 2022-09-20 RX ORDER — LORATADINE 10 MG/1
10 TABLET ORAL DAILY
COMMUNITY
End: 2022-09-20

## 2022-09-20 NOTE — PROGRESS NOTES
Chief Complaint  Breast Cancer    Azalea Abdullahi, A*  Azalea Abdullahi., APRN      Subjective          Cristiane Bishop presents to [facility] for 1 year follow up for breast cancer.       History of Present Illness   Ms. Cristiane Bishop presents for 1 year follow up for right breast cancer, stage I. Completed right mastectomy and completed 5 years of endocrine therapy in June of 2021.     She comes in today for mammogram and bone density results. Reports she will be having dental work done on October 16. She took Prolia injections in the past but has not had any for at least 2 years. She completed bone density on 9/16/22. She also completed left sided mammogram. She reports she feels well overall. She inquires about getting additional breast forms for the right breast for the bra.     Cancer Staging  Breast cancer in female (HCC)  Staging form: Breast, AJCC 8th Edition  - Clinical: Stage IA (cT1, cN0, cM0, G1, ER+, KS+, HER2-) - Signed by Tate Humphries MD on 6/18/2021       Treatment intent: curative    Oncology/Hematology History Overview Note   HEME/ONC DX/RX/INVESTIGATIONS  DX:   R breast ca, stage I, ER/KS positive, HER2 negative, grade I (low risk).    Anemia undergoing medical evaluation. As of 6/18/2021, patient is anticipating GI consultation in the near future.     Hyperglobulinemia, hyponatremia.     RX:   Femara, started in 6/2015. DC on 6/15/2021.  Prolia Q6m. On 6/18/20201, patient declined to continue Prolia.    INVESTIGATIONS:   5/28/2015, pathology report, right mastectomy, invasive ductal carcinoma, greatest dimension 7 mm, grade 1, 3 sentinel lymph nodes negative, ER/KS positive, HER-2 negative.    8/28/2020, L Mammogram, OTONIEL.    8/28/2020, DEXA, CONCLUSION: Normal bone density lumbar spine. Osteopenia in the hips.Bone density in the femoral necks has decreased by 1.9 percent compared to 2018.      Breast cancer in female (HCC)   5/8/2015  Initial Diagnosis    Breast cancer in female (CMS/HCC)     6/18/2021 - 12/16/2021 Chemotherapy    OP SUPPORTIVE Denosumab (Prolia) Q6M     6/18/2021 Cancer Staged    Staging form: Breast, AJCC 8th Edition  - Clinical: Stage IA (cT1, cN0, cM0, G1, ER+, MO+, HER2-) - Signed by Tate Humphries MD on 6/18/2021     Breast cancer (HCC) (Resolved)   6/17/2021 Initial Diagnosis    Breast cancer (CMS/HCC)     6/18/2021 Cancer Staged    Staging form: Breast, AJCC 8th Edition  - Clinical: Stage IA (cT1, cN0, cM0, G1, ER+, MO+, HER2-) - Signed by Tate Humphries MD on 6/18/2021         Review of Systems   Constitutional: Positive for fatigue. Negative for appetite change, diaphoresis, fever, unexpected weight gain and unexpected weight loss.   HENT: Negative for hearing loss, sore throat and voice change.    Eyes: Negative for blurred vision, double vision, pain, redness and visual disturbance.   Respiratory: Negative for cough, shortness of breath and wheezing.    Cardiovascular: Negative for chest pain, palpitations and leg swelling.   Endocrine: Negative for cold intolerance, heat intolerance, polydipsia and polyuria.   Genitourinary: Negative for decreased urine volume, difficulty urinating, frequency and urinary incontinence.   Musculoskeletal: Negative for arthralgias, back pain, joint swelling and myalgias.   Skin: Negative for color change, rash, skin lesions and wound.   Neurological: Negative for dizziness, seizures, numbness and headache.   Hematological: Negative for adenopathy. Does not bruise/bleed easily.   Psychiatric/Behavioral: Negative for depressed mood. The patient is not nervous/anxious.    All other systems reviewed and are negative.      Current Outpatient Medications on File Prior to Visit   Medication Sig Dispense Refill   • amLODIPine (NORVASC) 5 MG tablet Take 1 tablet by mouth Daily. 90 tablet 1   • aspirin 81 MG EC tablet Take 81 mg by mouth Daily.     • Calcium Carb-Cholecalciferol (Calcium 600+D3)  600-200 MG-UNIT tablet Take 1 tablet by mouth Daily. Last dose 1 month ago     • docusate sodium (COLACE) 100 MG capsule Take 100 mg by mouth Every Night.     • lisinopril (PRINIVIL,ZESTRIL) 20 MG tablet Take 1 tablet by mouth 2 (Two) Times a Day. 180 tablet 1   • loratadine (CLARITIN) 10 MG tablet Take 10 mg by mouth Every Night.     • pantoprazole (PROTONIX) 40 MG EC tablet Take 1 tablet by mouth Daily. 30 tablet 5   • PARoxetine (PAXIL) 20 MG tablet Take 1 tablet by mouth Every Night. 90 tablet 1   • polyethylene glycol (MIRALAX) 17 GM/SCOOP powder Take 17 g by mouth Daily. 507 g 0   • [DISCONTINUED] loratadine (CLARITIN) 10 MG tablet Take 10 mg by mouth Daily.       No current facility-administered medications on file prior to visit.       Allergies   Allergen Reactions   • Tetracycline Nausea Only and Rash     Difficulty swallowing     • Penicillins Rash   • Sulfa Antibiotics Rash     Past Medical History:   Diagnosis Date   • Allergic rhinitis    • Breast cancer (HCC)     RIGHT   • GERD (gastroesophageal reflux disease)    • HTN (hypertension)    • Hypertension    • Ingrown toenail    • Mood disorder (HCC)      Past Surgical History:   Procedure Laterality Date   • ABDOMINAL HYSTERECTOMY     • APPENDECTOMY     • BREAST BIOPSY Right    • COLONOSCOPY N/A 8/30/2021    Procedure: COLONOSCOPY;  Surgeon: Norman Damon MD;  Location: [facility] ENDOSCOPY;  Service: Gastroenterology;  Laterality: N/A;  NORMAL COLON   • ENDOSCOPY N/A 8/30/2021    Procedure: ESOPHAGOGASTRODUODENOSCOPY WITH BIOPSY;  Surgeon: Norman Damon MD;  Location: [facility] ENDOSCOPY;  Service: Gastroenterology;  Laterality: N/A;  GASTRITIS/HIATAL HERNIA   • ENDOSCOPY N/A 9/27/2021    Procedure: ESOPHAGOGASTRODUODENOSCOPY with biopsies;  Surgeon: Norman Damon MD;  Location: [facility] ENDOSCOPY;  Service: Gastroenterology;  Laterality: N/A;  GASTRITIS, HIATAL HERNIA   • ENDOSCOPY N/A 1/17/2022    Procedure: ESOPHAGOGASTRODUODENOSCOPY  WITH BX;  Surgeon: Norman Damon MD;  Location: [facility] ENDOSCOPY;  Service: Gastroenterology;  Laterality: N/A;  HIATAL HERNIA, REFULX ESOPHAGITIS   • HYSTERECTOMY     • MASTECTOMY     • MASTECTOMY Right 05/2015     Social History     Socioeconomic History   • Marital status:    Tobacco Use   • Smoking status: Never Smoker   • Smokeless tobacco: Never Used   Vaping Use   • Vaping Use: Never used   Substance and Sexual Activity   • Alcohol use: Never   • Drug use: Never   • Sexual activity: Defer     Family History   Problem Relation Age of Onset   • Lymphoma Mother    • Breast cancer Mother    • Cancer Mother    • Lymphoma Maternal Uncle    • Diabetes Maternal Uncle    • Heart disease Other    • Diabetes Paternal Uncle    • Lymphoma Other    • Stroke Other    • Colon cancer Neg Hx    • Malig Hyperthermia Neg Hx      Immunization History   Administered Date(s) Administered   • COVID-19 (PFIZER) PURPLE CAP 02/26/2021, 03/19/2021, 11/05/2021   • Covid-19 (Pfizer) Gray Cap 05/23/2022   • Fluad Quad 65+ 10/06/2020   • Fluzone High-Dose 65+yrs 11/05/2021   • Hepatitis A 07/10/2018, 04/01/2019   • Influenza, Unspecified 10/26/2017, 10/03/2018   • Pneumococcal Conjugate 13-Valent (PCV13) 10/17/2016   • Pneumococcal Polysaccharide (PPSV23) 03/26/2018   • Shingrix 10/06/2020, 12/05/2020   • Tdap 01/01/2014, 09/15/2014   • Zostavax 01/01/2014       Objective   Physical Exam  Vitals and nursing note reviewed.   Constitutional:       Appearance: Normal appearance. She is normal weight.   HENT:      Head: Normocephalic.      Nose: Nose normal.      Mouth/Throat:      Mouth: Mucous membranes are moist.   Eyes:      Pupils: Pupils are equal, round, and reactive to light.   Cardiovascular:      Rate and Rhythm: Normal rate and regular rhythm.      Pulses: Normal pulses.      Heart sounds: Normal heart sounds.   Pulmonary:      Effort: Pulmonary effort is normal.      Breath sounds: Normal breath sounds.   Abdominal:       General: Bowel sounds are normal.      Palpations: Abdomen is soft.   Musculoskeletal:         General: Normal range of motion.      Cervical back: Normal range of motion and neck supple.   Skin:     General: Skin is warm and dry.      Capillary Refill: Capillary refill takes less than 2 seconds.   Neurological:      General: No focal deficit present.      Mental Status: She is alert and oriented to person, place, and time.   Psychiatric:         Mood and Affect: Mood normal.         Behavior: Behavior normal.         Thought Content: Thought content normal.         Judgment: Judgment normal.         Vitals:    09/20/22 1305   BP: 143/66   Pulse: 79   Resp: 18   Temp: 97.5 °F (36.4 °C)   SpO2: 100%   Weight: 57.8 kg (127 lb 6.8 oz)   PainSc: 0-No pain     ECOG score: 0         ECOG: (0) Fully Active - Able to Carry On All Pre-disease Performance Without Restriction  Fall Risk Assessment was completed, and patient is at low risk for falls.  PHQ-9 Total Score:         The patient is  experiencing fatigue. Fatigue score: 8    PT/OT Functional Screening: PT fx screen: No needs identified  Speech Functional Screening: Speech fx screen: No needs identified  Rehab to be ordered: Rehab to be ordered: No needs identified        Result Review :   The following data was reviewed by: ORLIN Mckeon on 09/20/2022:  Lab Results   Component Value Date    HGB 12.0 05/18/2022    HCT 36.9 05/18/2022    MCV 96.1 05/18/2022     05/18/2022    WBC 7.02 05/18/2022    NEUTROABS 4.02 05/18/2022    LYMPHSABS 2.02 05/18/2022    MONOSABS 0.72 05/18/2022    EOSABS 0.21 05/18/2022    BASOSABS 0.03 05/18/2022     Lab Results   Component Value Date    GLUCOSE 101 (H) 05/18/2022    BUN 19 05/18/2022    CREATININE 0.80 07/07/2022     (L) 05/18/2022    K 5.0 05/18/2022    CL 97 (L) 05/18/2022    CO2 25.0 05/18/2022    CALCIUM 10.0 05/18/2022    PROTEINTOT 7.9 05/18/2022    ALBUMIN 4.40 05/18/2022    BILITOT 0.7 05/18/2022     ALKPHOS 122 (H) 05/18/2022    AST 21 05/18/2022    ALT 13 05/18/2022          Assessment and Plan    Diagnoses and all orders for this visit:    1. Encounter for screening mammogram for malignant neoplasm of breast (Primary)  -     Mammo Screening Modified With Tomosynthesis Left With CAD; Future    2. Malignant neoplasm of female breast, unspecified estrogen receptor status, unspecified laterality, unspecified site of breast (HCC)  -     Ambulatory Referral to Lymphedema Clinic    3. H/O right mastectomy  -     Ambulatory Referral to Lymphedema Clinic    Completed 5 years of endocrine therapy in June 2021.     Reviewed left mammogram which is benign and recommendation to repeat in 1 year.     Reviewed dexa scan results 9/13/2022: Normal BMD in the LS, osteopenia in the hips. She will continue Caltrate daily.     Having dental work done on October 16, 2022. Inquires about previous Prolia and dental work. She should be fine with dental work since it has been 2 years since she received her last Prolia injection.     Referral to lymphedema clinic for breast forms for the right breast.     Mammogram in 1 year to be scheduled.     Follow up 1 year with MD / NP.       Patient Follow Up: 1 year.   Patient was given instructions and counseling regarding her condition or for health maintenance advice. Please see specific information pulled into the AVS if appropriate.     Collette Mann, APRN    9/20/2022

## 2022-10-05 DIAGNOSIS — I10 WHITE COAT SYNDROME WITH DIAGNOSIS OF HYPERTENSION: ICD-10-CM

## 2022-10-05 RX ORDER — LISINOPRIL 20 MG/1
TABLET ORAL
Qty: 180 TABLET | Refills: 1 | OUTPATIENT
Start: 2022-10-05

## 2022-10-07 DIAGNOSIS — I10 WHITE COAT SYNDROME WITH DIAGNOSIS OF HYPERTENSION: ICD-10-CM

## 2022-10-07 RX ORDER — LISINOPRIL 20 MG/1
20 TABLET ORAL 2 TIMES DAILY
Qty: 60 TABLET | Refills: 0 | Status: SHIPPED | OUTPATIENT
Start: 2022-10-07 | End: 2022-10-12 | Stop reason: SDUPTHER

## 2022-10-07 RX ORDER — LISINOPRIL 20 MG/1
TABLET ORAL
Qty: 180 TABLET | OUTPATIENT
Start: 2022-10-07

## 2022-10-07 NOTE — TELEPHONE ENCOUNTER
Patient states that she is out of Lisinopril.  Is requesting a courtesy refill until her appt with Neli

## 2022-10-12 ENCOUNTER — OFFICE VISIT (OUTPATIENT)
Dept: FAMILY MEDICINE CLINIC | Facility: CLINIC | Age: 82
End: 2022-10-12

## 2022-10-12 VITALS
OXYGEN SATURATION: 93 % | WEIGHT: 126 LBS | DIASTOLIC BLOOD PRESSURE: 90 MMHG | TEMPERATURE: 97 F | SYSTOLIC BLOOD PRESSURE: 154 MMHG | BODY MASS INDEX: 26.33 KG/M2 | HEART RATE: 89 BPM

## 2022-10-12 DIAGNOSIS — F32.A ANXIETY AND DEPRESSION: ICD-10-CM

## 2022-10-12 DIAGNOSIS — K21.9 GASTROESOPHAGEAL REFLUX DISEASE, UNSPECIFIED WHETHER ESOPHAGITIS PRESENT: ICD-10-CM

## 2022-10-12 DIAGNOSIS — I10 WHITE COAT SYNDROME WITH DIAGNOSIS OF HYPERTENSION: Primary | ICD-10-CM

## 2022-10-12 DIAGNOSIS — F41.9 ANXIETY AND DEPRESSION: ICD-10-CM

## 2022-10-12 PROCEDURE — 99214 OFFICE O/P EST MOD 30 MIN: CPT | Performed by: NURSE PRACTITIONER

## 2022-10-12 RX ORDER — LISINOPRIL 20 MG/1
20 TABLET ORAL 2 TIMES DAILY
Qty: 180 TABLET | Refills: 1 | Status: SHIPPED | OUTPATIENT
Start: 2022-10-12 | End: 2023-02-11

## 2022-10-12 RX ORDER — PAROXETINE HYDROCHLORIDE 20 MG/1
20 TABLET, FILM COATED ORAL NIGHTLY
Qty: 90 TABLET | Refills: 1 | Status: SHIPPED | OUTPATIENT
Start: 2022-10-12

## 2022-10-12 RX ORDER — AMLODIPINE BESYLATE 5 MG/1
5 TABLET ORAL DAILY
Qty: 90 TABLET | Refills: 1 | Status: SHIPPED | OUTPATIENT
Start: 2022-10-12

## 2022-10-12 RX ORDER — PANTOPRAZOLE SODIUM 40 MG/1
40 TABLET, DELAYED RELEASE ORAL DAILY
Qty: 90 TABLET | Refills: 1 | Status: SHIPPED | OUTPATIENT
Start: 2022-10-12 | End: 2023-02-11

## 2022-10-12 NOTE — PROGRESS NOTES
Chief Complaint  Hypertension and Anxiety    Subjective            Cristiane Bishop presents to [facility]  History of Present Illness     Patient presents to the office today to follow-up on chronic health conditions and for medication refills.  The pharmacy told her that she did not have any refills remaining on her prescription.    She is currently prescribed Paxil 20 mg daily for treatment of anxiety and depression.  At present, she feels like her symptoms are stable.  She denies feelings of sadness, or feeling withdrawn.  Her sleep is okay.  She denies any homicidal ideations or suicidal ideations.  She denies any AVH.    She is prescribed amlodipine 5 mg daily, in addition to lisinopril 20 mg twice daily, for treatment of hypertension.  She does have whitecoat syndrome.  Her blood pressure is typically elevated when she is seen in the office; however, she does monitor her blood pressure at home and reports that her systolic is usually 120-130.  Her diastolic is usually around 75.  She denies any chest pain, palpitations, headaches, dizziness, or shortness of breath.  She denies any lower extremity edema.    She did see Dr. Sanders a few months ago regarding the elevated catecholamines.  He performed a dedicated CT of the adrenal glands which did not show any acute findings.  CT in January also showed no acute findings of the adrenal glands.  There was prominence of the gastric cardia on both CTs, but she has since then had an EGD with Dr. Damon. The EGD with Dr. Damon showed a medium size hiatal hernia, as well as LA grade C esophagitis, but normal stomach and normal duodenum.      She continues to take Protonix for her GERD symptoms and PUD prophylaxis.  She was also treated for H. pylori gastritis on several occasions.    She sees Odilia Quijano at the cancer [facility] for her history of breast cancer.  She has had a recent mammogram which was benign.  She  also had a DEXA.      Past Medical History:   Diagnosis Date   • Allergic rhinitis    • Breast cancer (HCC)     RIGHT   • GERD (gastroesophageal reflux disease)    • HTN (hypertension)    • Hypertension    • Ingrown toenail    • Mood disorder (HCC)        Allergies   Allergen Reactions   • Tetracycline Nausea Only and Rash     Difficulty swallowing     • Penicillins Rash   • Sulfa Antibiotics Rash        Past Surgical History:   Procedure Laterality Date   • ABDOMINAL HYSTERECTOMY     • APPENDECTOMY     • BREAST BIOPSY Right    • COLONOSCOPY N/A 8/30/2021    Procedure: COLONOSCOPY;  Surgeon: Norman Damon MD;  Location: [facility] ENDOSCOPY;  Service: Gastroenterology;  Laterality: N/A;  NORMAL COLON   • ENDOSCOPY N/A 8/30/2021    Procedure: ESOPHAGOGASTRODUODENOSCOPY WITH BIOPSY;  Surgeon: Norman Damon MD;  Location: [facility] ENDOSCOPY;  Service: Gastroenterology;  Laterality: N/A;  GASTRITIS/HIATAL HERNIA   • ENDOSCOPY N/A 9/27/2021    Procedure: ESOPHAGOGASTRODUODENOSCOPY with biopsies;  Surgeon: Norman Damon MD;  Location: [facility] ENDOSCOPY;  Service: Gastroenterology;  Laterality: N/A;  GASTRITIS, HIATAL HERNIA   • ENDOSCOPY N/A 1/17/2022    Procedure: ESOPHAGOGASTRODUODENOSCOPY WITH BX;  Surgeon: Norman Damon MD;  Location: [facility] ENDOSCOPY;  Service: Gastroenterology;  Laterality: N/A;  HIATAL HERNIA, REFULX ESOPHAGITIS   • HYSTERECTOMY     • MASTECTOMY     • MASTECTOMY Right 05/2015        Social History     Tobacco Use   • Smoking status: Never   • Smokeless tobacco: Never   Vaping Use   • Vaping Use: Never used   Substance Use Topics   • Alcohol use: Never   • Drug use: Never       Family History   Problem Relation Age of Onset   • Lymphoma Mother    • Breast cancer Mother    • Cancer Mother    • Lymphoma Maternal Uncle    • Diabetes Maternal Uncle    • Heart disease Other    • Diabetes Paternal Uncle    • Lymphoma Other    • Stroke Other    • Colon cancer Neg Hx    • Malig  Hyperthermia Neg Hx         There are no preventive care reminders to display for this patient.     Current Outpatient Medications on File Prior to Visit   Medication Sig   • aspirin 81 MG EC tablet Take 81 mg by mouth Daily.   • Calcium Carb-Cholecalciferol (Calcium 600+D3) 600-200 MG-UNIT tablet Take 1 tablet by mouth Daily. Last dose 1 month ago   • docusate sodium (COLACE) 100 MG capsule Take 100 mg by mouth Every Night.   • loratadine (CLARITIN) 10 MG tablet Take 10 mg by mouth Every Night.   • [DISCONTINUED] amLODIPine (NORVASC) 5 MG tablet Take 1 tablet by mouth Daily.   • [DISCONTINUED] lisinopril (PRINIVIL,ZESTRIL) 20 MG tablet Take 1 tablet by mouth 2 (Two) Times a Day.   • [DISCONTINUED] pantoprazole (PROTONIX) 40 MG EC tablet Take 1 tablet by mouth Daily.   • [DISCONTINUED] PARoxetine (PAXIL) 20 MG tablet Take 1 tablet by mouth Every Night.   • [DISCONTINUED] polyethylene glycol (MIRALAX) 17 GM/SCOOP powder Take 17 g by mouth Daily.     No current facility-administered medications on file prior to visit.       Immunization History   Administered Date(s) Administered   • COVID-19 (MODERNA) BIVALENT BOOSTER 18+YRS 10/05/2022   • COVID-19 (PFIZER) PURPLE CAP 02/26/2021, 03/19/2021, 11/05/2021   • Covid-19 (Pfizer) Gray Cap 05/23/2022   • Fluad Quad 65+ 10/06/2020   • Fluzone High-Dose 65+yrs 11/05/2021, 10/05/2022   • Hepatitis A 07/10/2018, 04/01/2019   • Influenza, Unspecified 10/26/2017, 10/03/2018   • Pneumococcal Conjugate 13-Valent (PCV13) 10/17/2016   • Pneumococcal Polysaccharide (PPSV23) 03/26/2018   • Shingrix 10/06/2020, 12/05/2020   • Tdap 01/01/2014, 09/15/2014   • Zostavax 01/01/2014       Review of Systems     Objective     /90   Pulse 89   Temp 97 °F (36.1 °C)   Wt 57.2 kg (126 lb)   SpO2 93%   BMI 26.33 kg/m²       Physical Exam  Vitals reviewed.   Constitutional:       General: She is not in acute distress.     Appearance: Normal appearance. She is well-developed.   HENT:       Head: Normocephalic and atraumatic.   Eyes:      General: No scleral icterus.     Extraocular Movements: Extraocular movements intact.      Conjunctiva/sclera: Conjunctivae normal.   Neck:      Thyroid: No thyroid mass, thyromegaly or thyroid tenderness.      Vascular: No carotid bruit.      Trachea: Trachea normal.   Cardiovascular:      Rate and Rhythm: Normal rate and regular rhythm.      Pulses: Normal pulses.      Heart sounds: No murmur heard.  Pulmonary:      Effort: Pulmonary effort is normal. No respiratory distress.      Breath sounds: Normal breath sounds. No wheezing, rhonchi or rales.   Musculoskeletal:         General: Normal range of motion.      Cervical back: Normal range of motion and neck supple.      Right lower leg: No edema.      Left lower leg: No edema.   Lymphadenopathy:      Cervical: No cervical adenopathy.   Skin:     General: Skin is warm and dry.   Neurological:      Mental Status: She is alert and oriented to person, place, and time.   Psychiatric:         Mood and Affect: Mood and affect normal.         Behavior: Behavior normal.         Thought Content: Thought content normal.         Judgment: Judgment normal.         Result Review :     The following data was reviewed by: ORLIN Denson on 10/12/2022:    CMP    CMP 12/15/21 5/18/22 7/7/22   Glucose 124 (A) 101 (A)    BUN 16 19    Creatinine 0.91 0.91 0.80   eGFR Non African Am 60 (A)     Sodium 130 (A) 132 (A)    Potassium 4.2 5.0    Chloride 92 (A) 97 (A)    Calcium 9.5 10.0    Albumin  4.40    Total Bilirubin  0.7    Alkaline Phosphatase  122 (A)    AST (SGOT)  21    ALT (SGPT)  13    (A) Abnormal value       Comments are available for some flowsheets but are not being displayed.           CBC    CBC 3/28/22 5/18/22   WBC 5.99 7.02   RBC 3.70 (A) 3.84   Hemoglobin 11.6 (A) 12.0   Hematocrit 35.9 36.9   MCV 97.0 96.1   MCH 31.4 31.3   MCHC 32.3 32.5   RDW 12.8 12.3   Platelets 299 301   (A) Abnormal value            TSH     TSH 5/18/22   TSH 3.820           Microalbumin    Microalbumin 5/18/22   Microalbumin, Urine <1.2             Data reviewed: Radiologic studies : Mammogram/DEXA, CT AP, Consultant notes : Endocrinology and GI studies : EGD/Colonoscopy     DEXA Bone Density Axial (09/13/2022 12:08)  Mammo Screening Modified With Tomosynthesis Left With CAD (09/13/2022 12:32)    06/23/2022 - OV with Dr. Sanders  CT Abdomen Pelvis With Contrast (01/04/2022 15:52)  CT Abdomen With & Without Contrast (07/07/2022 12:19)    UPPER GI ENDOSCOPY (08/30/2021 12:05)  COLONOSCOPY (08/30/2021 12:05)  UPPER GI ENDOSCOPY (09/27/2021 12:02)  UPPER GI ENDOSCOPY (01/17/2022 08:32)         Assessment and Plan      Diagnoses and all orders for this visit:    1. White coat syndrome with diagnosis of hypertension (Primary)  -     lisinopril (PRINIVIL,ZESTRIL) 20 MG tablet; Take 1 tablet by mouth 2 (Two) Times a Day.  Dispense: 180 tablet; Refill: 1  -     amLODIPine (NORVASC) 5 MG tablet; Take 1 tablet by mouth Daily.  Dispense: 90 tablet; Refill: 1  -     Comprehensive Metabolic Panel; Future  -     Lipid Panel; Future    2. Anxiety and depression  -     PARoxetine (PAXIL) 20 MG tablet; Take 1 tablet by mouth Every Night.  Dispense: 90 tablet; Refill: 1    3. Gastroesophageal reflux disease, unspecified whether esophagitis present  -     pantoprazole (PROTONIX) 40 MG EC tablet; Take 1 tablet by mouth Daily.  Dispense: 90 tablet; Refill: 1            Follow Up     Return in about 6 months (around 4/12/2023) for Next scheduled follow up.     She continues to monitor her blood pressure at home and reports normal readings.  I will continue her on lisinopril 20 mg twice daily and addition to Norvasc 5 mg once daily.  She will follow-up next month for her fasting blood work.    Continue Paxil for treatment of anxiety and depression.  Symptoms are reportedly stable today.    Continue Protonix for treatment of GERD.  Continue to follow-up with GI per  their instructions.  She will see Dr. Sanders back in November to discuss CT and labs and how to proceed regarding increased catecholamine.  No evidence of adrenal tumor on either CT scan.    Patient was given instructions and counseling regarding her condition or for health maintenance advice. Please see specific information pulled into the AVS if appropriate.

## 2022-11-07 ENCOUNTER — HOSPITAL ENCOUNTER (OUTPATIENT)
Dept: OCCUPATIONAL THERAPY | Facility: HOSPITAL | Age: 82
Setting detail: THERAPIES SERIES
Discharge: HOME | End: 2022-11-07

## 2022-11-07 DIAGNOSIS — Z90.11 HISTORY OF MASTECTOMY, RIGHT: ICD-10-CM

## 2022-11-07 DIAGNOSIS — N64.89 DEFORMITY DUE TO MASTECTOMY: Primary | ICD-10-CM

## 2022-11-07 DIAGNOSIS — Z90.10 DEFORMITY DUE TO MASTECTOMY: Primary | ICD-10-CM

## 2022-11-07 DIAGNOSIS — Z44.9 FITTING AND ADJUSTMENT OF UNSPECIFIED PROSTHETIC DEVICE: ICD-10-CM

## 2022-11-07 PROCEDURE — L8000 MASTECTOMY BRA: HCPCS | Performed by: OCCUPATIONAL THERAPIST

## 2022-11-07 PROCEDURE — 97165 OT EVAL LOW COMPLEX 30 MIN: CPT | Performed by: OCCUPATIONAL THERAPIST

## 2022-11-07 PROCEDURE — L8030 BREAST PROSTHES W/O ADHESIVE: HCPCS | Performed by: OCCUPATIONAL THERAPIST

## 2022-11-07 NOTE — THERAPY EVALUATION
Outpatient Occupational Therapy Lymphedema Initial Evaluation   [place]     Patient Name: Cristiane Bishop  : 1940  MRN: [id number]  Today's Date: 2022      Visit Date: 2022    Patient Active Problem List   Diagnosis   • Breast cancer in female (HCC)   • Heel pain   • Allergic rhinitis   • Esophageal reflux   • Heel spur   • History of breast cancer   • Essential hypertension   • HTN (hypertension)   • Ingrown toenail   • Depression   • Mood disorder (HCC)   • Osteopenia   • Bacterial infection due to H. pylori   • Iron deficiency anemia due to chronic blood loss   • Heartburn   • Abnormal CT scan, stomach        Past Medical History:   Diagnosis Date   • Allergic rhinitis    • Breast cancer (HCC)     RIGHT   • GERD (gastroesophageal reflux disease)    • HTN (hypertension)    • Hypertension    • Ingrown toenail    • Mood disorder (HCC)         Past Surgical History:   Procedure Laterality Date   • ABDOMINAL HYSTERECTOMY     • APPENDECTOMY     • BREAST BIOPSY Right    • COLONOSCOPY N/A 2021    Procedure: COLONOSCOPY;  Surgeon: Norman Damon MD;  Location: [facility] ENDOSCOPY;  Service: Gastroenterology;  Laterality: N/A;  NORMAL COLON   • ENDOSCOPY N/A 2021    Procedure: ESOPHAGOGASTRODUODENOSCOPY WITH BIOPSY;  Surgeon: Norman Damon MD;  Location: [facility] ENDOSCOPY;  Service: Gastroenterology;  Laterality: N/A;  GASTRITIS/HIATAL HERNIA   • ENDOSCOPY N/A 2021    Procedure: ESOPHAGOGASTRODUODENOSCOPY with biopsies;  Surgeon: Norman Damon MD;  Location: [facility] ENDOSCOPY;  Service: Gastroenterology;  Laterality: N/A;  GASTRITIS, HIATAL HERNIA   • ENDOSCOPY N/A 2022    Procedure: ESOPHAGOGASTRODUODENOSCOPY WITH BX;  Surgeon: Norman Damon MD;  Location: [facility] ENDOSCOPY;  Service: Gastroenterology;  Laterality: N/A;  HIATAL HERNIA, REFULX ESOPHAGITIS   • HYSTERECTOMY     • MASTECTOMY     • MASTECTOMY Right 2015         Visit Dx:     ICD-10-CM ICD-9-CM    1. Deformity due to mastectomy  N64.89 611.89    Z90.10    2. Fitting and adjustment of unspecified prosthetic device  Z44.9 V52.9   3. History of mastectomy, right  Z90.11 V45.71        Patient History     Row Name 11/07/22 0600             History    Chief Complaint --  She presents today to be fitted for right breast prosthesis and postmastectomy bras  -TD      Brief Description of Current Complaint Cristiane is an 82-year-old female with a recent history of right mastectomy in May 2015.  She presents this date for right prosthetic breast with postmastectomy bras.  -TD         Fall Risk Assessment    Any falls in the past year: No  -TD      Does patient have a fear of falling No  -TD         Services    Are you currently receiving Home Health services No  -TD      Do you plan to receive Home Health services in the near future No  -TD         Daily Activities    Primary Language English  -TD      Are you able to read Yes  -TD      Are you able to write Yes  -TD      How does patient learn best? Listening;Reading;Demonstration  -TD         Safety    Are you being hurt, hit, or frightened by anyone at home or in your life? No  -TD      Are you being neglected by a caregiver No  -TD      Have you had any of the following issues with N/A  -TD            User Key  (r) = Recorded By, (t) = Taken By, (c) = Cosigned By    Initials Name Provider Type    TD Frieda Michaud OT Occupational Therapist                     OT Ortho     Row Name 11/07/22 0600             Orthotics & Prosthetics Management    Orthosis Location other (see comments)  Breast prosthesis and mastectomy bra orthosis  -TD      Additional Documentation Orthosis Location (Row)  -TD            User Key  (r) = Recorded By, (t) = Taken By, (c) = Cosigned By    Initials Name Provider Type    Frieda Laguna OT Occupational Therapist               OT Mastectomy Care:   Location: Right chest wall    Type of Prosthetic: silicone breast form    Reason for  Visit/Customer Goal:  Patient would like to achieve symmetry and balance in appearance to improve orthopedic balance as well as improve psychological impact when engaging in community and social activities.    Reason for Prosthetic: Prevent Deformities    Date of Surgery: May 2015    Date of Discharge: May 2015    Wearing Schedule: As Tolerated    Prosthetic Site Condition: Intact    Tolerance: Tolerates Well    Product :  Ham    Product Name and Model Number: 400 Natura Xtra 2n size: 7    Garments  Type of Garment Dispensed: Mast Bra w/o Breast Form    Breast : Ham    Product Name: 4- [address]391 Lynnette satin 40B Nude     Number of Garments Dispensed: 4      Therapy Education  Education Details: Pt. was fitted with a 400 Natura xtra 2n size: 7 silicone breast prosthesis. See below.  Fit and size are appropriate with no gapping, pinching, or puckering observed.  Pt. states comfort and satisfaction with both bra's selected and with prosthesis.  All devices were checked for quality and safety.  Pt. was educated on the benefits, precautions warranty, care and maintenance for her prosthesis and bras.  Educational handout was provided in the prosthesis box.  Given: HEP  Program: New  How Provided: Verbal  Provided to: Patient  Level of Understanding: Teach back education performed    Goals:      1. Encounter for Breast Prosthetic and mastectomy bra fitting:  LTG 1:  90 days: To provide balance and symmetry for performance of daily activity, the patient will participate in breast prosthesis and mastectomy bra fitting procedure.   STATUS: New  STG 1a: 30 days:  Patient will participate in mastectomy bra fit re-evaluation to ensure proper fit for balance and symmetry for improved postural alignment/lymph fluid dynamics to prevent impairment in activities of daily living.   STATUS: New  STG 1b: 30 days:  Patient will participate in breast prosthesis fit re-evaluation 2 years after initial  distribution to ensure proper fit for balance and symmetry for improved postural alignment/lymph fluid dynamics to prevent impairment in activities of daily living.  STATUS: New  TREATMENT: Orthotic/Prosthetic Management and Training, Amoena  Silicone Breast Prosthetic, Mastectomy Bra initial fitting and   3 month re-fitting, ADL/Self Care, Therapeutic Activity, Therapeutic Exercise, and Manual Therapy.     OT Goals     Row Name 11/07/22 1056          Time Calculation    OT Goal Re-Cert Due Date 12/07/22  -TD           User Key  (r) = Recorded By, (t) = Taken By, (c) = Cosigned By    Initials Name Provider Type    TD Frieda Michaud OT Occupational Therapist                 OT Assessment/Plan     Row Name 11/07/22 0649          OT Assessment    Functional Limitations Performance in self-care ADL  -TD     Assessment Comments Pt presents with decreased balance and symmetry from breast resection that impacts orthopedic balance and symmetry.  Patient will benefit from continued evaluation and of integrity of the silicone breast form as well as the mastectomy bras to ensure proper fit for symmetry and balance of breast prosthesis to reduce orthopedic and lymphatic impairment. The skills of a therapist will be required to safely and effectively implement the following treatment plan to restore maximal level of function.  -TD     OT Diagnosis History of right mastectomy  -TD     OT Rehab Potential Good  -TD     Patient/caregiver participated in establishment of treatment plan and goals Yes  -TD     Patient would benefit from skilled therapy intervention Yes  -TD        OT Plan    OT Frequency 1x/week  0-1  -TD     Predicted Duration of Therapy Intervention (OT) Patient will be seen every 3 months for postmastectomy bras and every 2 years for breast prosthesis  -TD     Planned CPT's? OT EVAL LOW COMPLEXITY: 39038;OT RE-EVAL: 74234;OT THER ACT EA 15 MIN: 52794NB;OT THER PROC EA 15 MIN: 53130WQ;OT SELF CARE/MGMT/TRAIN 15 MIN:  40801;OT MANUAL THERAPY EA 15 MIN: 69772;OT BIS XTRACELL FLUID ANALYSIS: 98786;OT ORTHOTIC MGMT/TRAIN EA 15 MIN: 73059;OT ORTHO/PROSTHET CHECKOUT EA 15 MIN: 15638;OT CARE PLAN EA 15 MIN  -TD     Planned Therapy Interventions (Optional Details) home exercise program;manual therapy techniques;strengthening;ROM (Range of Motion);prosthetic fitting/training;patient/family education;orthotic fitting/training;stretching  -TD     OT Plan Comments See plan of care  -TD           User Key  (r) = Recorded By, (t) = Taken By, (c) = Cosigned By    Initials Name Provider Type    TD Frieda Michaud OT Occupational Therapist              OT eval complexity:   Examination:   Performance Deficits include:  dressing   # deficits affecting performance:  1-3  Decision Making:   Treatment Options Considered : remediation  # Treatment options considered : limited  Modification Made for: n/a   Level of Task Modification: n/a  Comorbidity Affect Performance: n/a  How is performance affected: none  Evaluation Level Determined:  low                Time Calculation:   Untimed Charges  OT Eval/Re-eval Minutes: 90  Total Minutes  Untimed Charges Total Minutes: 90   Total Minutes: 90     Therapy Charges for Today     Code Description Service Date Service Provider Modifiers Qty    40411421236  OT EVAL LOW COMPLEXITY 4 11/7/2022 Frieda Michaud OT GO 1    [id number]  BRA POST/SURG NO/FORM IVAN/CONNOR/DANIEL/POWER/JORGE 11/7/2022 Frieda Michaud OT  4    [id number]  BREAST PROSTHESIS WO ADHS NATURA AMOENA 11/7/2022 Frieda Michaud OT  1                    Frieda Michaud OT  11/7/2022

## 2022-12-15 ENCOUNTER — CLINICAL SUPPORT (OUTPATIENT)
Dept: FAMILY MEDICINE CLINIC | Facility: CLINIC | Age: 82
End: 2022-12-15

## 2022-12-15 DIAGNOSIS — I10 WHITE COAT SYNDROME WITH DIAGNOSIS OF HYPERTENSION: ICD-10-CM

## 2022-12-15 LAB
ALBUMIN SERPL-MCNC: 4.2 G/DL (ref 3.5–5.2)
ALBUMIN/GLOB SERPL: 1.3 G/DL
ALP SERPL-CCNC: 118 U/L (ref 39–117)
ALT SERPL W P-5'-P-CCNC: 10 U/L (ref 1–33)
ANION GAP SERPL CALCULATED.3IONS-SCNC: 9.6 MMOL/L (ref 5–15)
AST SERPL-CCNC: 16 U/L (ref 1–32)
BILIRUB SERPL-MCNC: 0.5 MG/DL (ref 0–1.2)
BUN SERPL-MCNC: 16 MG/DL (ref 8–23)
BUN/CREAT SERPL: 17.4 (ref 7–25)
CALCIUM SPEC-SCNC: 9.7 MG/DL (ref 8.6–10.5)
CHLORIDE SERPL-SCNC: 101 MMOL/L (ref 98–107)
CHOLEST SERPL-MCNC: 167 MG/DL (ref 0–200)
CO2 SERPL-SCNC: 28.4 MMOL/L (ref 22–29)
CREAT SERPL-MCNC: 0.92 MG/DL (ref 0.57–1)
EGFRCR SERPLBLD CKD-EPI 2021: 63.1 ML/MIN/1.73
GLOBULIN UR ELPH-MCNC: 3.2 GM/DL
GLUCOSE SERPL-MCNC: 94 MG/DL (ref 65–99)
HDLC SERPL-MCNC: 36 MG/DL (ref 40–60)
LDLC SERPL CALC-MCNC: 100 MG/DL (ref 0–100)
LDLC/HDLC SERPL: 2.64 {RATIO}
POTASSIUM SERPL-SCNC: 4.3 MMOL/L (ref 3.5–5.2)
PROT SERPL-MCNC: 7.4 G/DL (ref 6–8.5)
SODIUM SERPL-SCNC: 139 MMOL/L (ref 136–145)
TRIGL SERPL-MCNC: 180 MG/DL (ref 0–150)
VLDLC SERPL-MCNC: 31 MG/DL (ref 5–40)

## 2022-12-15 PROCEDURE — 80053 COMPREHEN METABOLIC PANEL: CPT | Performed by: NURSE PRACTITIONER

## 2022-12-15 PROCEDURE — 36415 COLL VENOUS BLD VENIPUNCTURE: CPT | Performed by: NURSE PRACTITIONER

## 2022-12-15 PROCEDURE — 80061 LIPID PANEL: CPT | Performed by: NURSE PRACTITIONER

## 2022-12-15 NOTE — PROGRESS NOTES
Venipuncture Blood Specimen Collection  Venipuncture performed in left arm by Chanell Bowser with good hemostasis. Patient tolerated the procedure well without complications.   12/15/22   Chanell Bowser

## 2022-12-16 DIAGNOSIS — E78.5 DYSLIPIDEMIA: Primary | ICD-10-CM

## 2022-12-16 RX ORDER — ATORVASTATIN CALCIUM 20 MG/1
20 TABLET, FILM COATED ORAL DAILY
Qty: 90 TABLET | Refills: 0 | Status: SHIPPED | OUTPATIENT
Start: 2022-12-16 | End: 2023-03-10

## 2023-02-11 DIAGNOSIS — I10 WHITE COAT SYNDROME WITH DIAGNOSIS OF HYPERTENSION: ICD-10-CM

## 2023-02-11 DIAGNOSIS — K21.9 GASTROESOPHAGEAL REFLUX DISEASE, UNSPECIFIED WHETHER ESOPHAGITIS PRESENT: ICD-10-CM

## 2023-02-11 RX ORDER — PANTOPRAZOLE SODIUM 40 MG/1
40 TABLET, DELAYED RELEASE ORAL DAILY
Qty: 90 TABLET | Refills: 1 | Status: SHIPPED | OUTPATIENT
Start: 2023-02-11

## 2023-02-11 RX ORDER — LISINOPRIL 20 MG/1
TABLET ORAL
Qty: 180 TABLET | Refills: 1 | Status: SHIPPED | OUTPATIENT
Start: 2023-02-11

## 2023-03-10 DIAGNOSIS — E78.5 DYSLIPIDEMIA: ICD-10-CM

## 2023-03-10 RX ORDER — ATORVASTATIN CALCIUM 20 MG/1
20 TABLET, FILM COATED ORAL DAILY
Qty: 90 TABLET | Refills: 0 | Status: SHIPPED | OUTPATIENT
Start: 2023-03-10

## 2023-03-27 ENCOUNTER — TELEPHONE (OUTPATIENT)
Dept: FAMILY MEDICINE CLINIC | Facility: CLINIC | Age: 83
End: 2023-03-27

## 2023-03-27 ENCOUNTER — CLINICAL SUPPORT (OUTPATIENT)
Dept: FAMILY MEDICINE CLINIC | Facility: CLINIC | Age: 83
End: 2023-03-27
Payer: MEDICARE

## 2023-03-27 DIAGNOSIS — E78.5 DYSLIPIDEMIA: ICD-10-CM

## 2023-03-27 PROCEDURE — 80053 COMPREHEN METABOLIC PANEL: CPT | Performed by: NURSE PRACTITIONER

## 2023-03-27 PROCEDURE — 80061 LIPID PANEL: CPT | Performed by: NURSE PRACTITIONER

## 2023-03-27 PROCEDURE — 36415 COLL VENOUS BLD VENIPUNCTURE: CPT | Performed by: NURSE PRACTITIONER

## 2023-03-27 NOTE — TELEPHONE ENCOUNTER
"  Caller: Cristiane Bishop \"GAMA\"    Relationship: Self    Best call back number: [phone]    What is the best time to reach you: ANY    Who are you requesting to speak with (clinical staff, provider,  specific staff member):     What was the call regarding: PATIENT STATED THAT SHE MIGHT HAVE DROPPED A LETTER FROM [employer] IN THE OFFICE TODAY DURING HER VISIT. PLEASE CALL HER BACK IF FOUND. OFFICE WAS CALLED AND NO ONE HAD FOUND IT AT THAT TIME, BUT IF IT IS FOUND, PLEASE CALL THE PATIENT.    Do you require a callback: YES, IF FOUND        "

## 2023-03-27 NOTE — PROGRESS NOTES
Venipuncture Blood Specimen Collection  Venipuncture performed in left arm by Chanell Cowart with good hemostasis. Patient tolerated the procedure well without complications.   03/27/23   Chanell Cowart

## 2023-03-28 DIAGNOSIS — N28.9 ABNORMAL RENAL FUNCTION: Primary | ICD-10-CM

## 2023-03-28 LAB
ALBUMIN SERPL-MCNC: 4.1 G/DL (ref 3.5–5.2)
ALBUMIN/GLOB SERPL: 1.3 G/DL
ALP SERPL-CCNC: 107 U/L (ref 39–117)
ALT SERPL W P-5'-P-CCNC: 10 U/L (ref 1–33)
ANION GAP SERPL CALCULATED.3IONS-SCNC: 11 MMOL/L (ref 5–15)
AST SERPL-CCNC: 24 U/L (ref 1–32)
BILIRUB SERPL-MCNC: 0.7 MG/DL (ref 0–1.2)
BUN SERPL-MCNC: 21 MG/DL (ref 8–23)
BUN/CREAT SERPL: 20.6 (ref 7–25)
CALCIUM SPEC-SCNC: 9.4 MG/DL (ref 8.6–10.5)
CHLORIDE SERPL-SCNC: 101 MMOL/L (ref 98–107)
CHOLEST SERPL-MCNC: 114 MG/DL (ref 0–200)
CO2 SERPL-SCNC: 25 MMOL/L (ref 22–29)
CREAT SERPL-MCNC: 1.02 MG/DL (ref 0.57–1)
EGFRCR SERPLBLD CKD-EPI 2021: 55.7 ML/MIN/1.73
GLOBULIN UR ELPH-MCNC: 3.1 GM/DL
GLUCOSE SERPL-MCNC: 95 MG/DL (ref 65–99)
HDLC SERPL-MCNC: 44 MG/DL (ref 40–60)
LDLC SERPL CALC-MCNC: 55 MG/DL (ref 0–100)
LDLC/HDLC SERPL: 1.26 {RATIO}
POTASSIUM SERPL-SCNC: 4.5 MMOL/L (ref 3.5–5.2)
PROT SERPL-MCNC: 7.2 G/DL (ref 6–8.5)
SODIUM SERPL-SCNC: 137 MMOL/L (ref 136–145)
TRIGL SERPL-MCNC: 73 MG/DL (ref 0–150)
VLDLC SERPL-MCNC: 15 MG/DL (ref 5–40)

## 2023-04-12 ENCOUNTER — OFFICE VISIT (OUTPATIENT)
Dept: FAMILY MEDICINE CLINIC | Facility: CLINIC | Age: 83
End: 2023-04-12
Payer: MEDICARE

## 2023-04-12 VITALS
BODY MASS INDEX: 26.13 KG/M2 | SYSTOLIC BLOOD PRESSURE: 130 MMHG | DIASTOLIC BLOOD PRESSURE: 78 MMHG | OXYGEN SATURATION: 97 % | TEMPERATURE: 97.5 F | WEIGHT: 125 LBS | HEART RATE: 87 BPM

## 2023-04-12 DIAGNOSIS — I10 WHITE COAT SYNDROME WITH DIAGNOSIS OF HYPERTENSION: Primary | ICD-10-CM

## 2023-04-12 DIAGNOSIS — F41.9 ANXIETY AND DEPRESSION: ICD-10-CM

## 2023-04-12 DIAGNOSIS — N28.9 ABNORMAL RENAL FUNCTION: ICD-10-CM

## 2023-04-12 DIAGNOSIS — R35.1 NOCTURIA: ICD-10-CM

## 2023-04-12 DIAGNOSIS — E78.5 DYSLIPIDEMIA: ICD-10-CM

## 2023-04-12 DIAGNOSIS — F32.A ANXIETY AND DEPRESSION: ICD-10-CM

## 2023-04-12 DIAGNOSIS — K14.6 BURNING TONGUE: ICD-10-CM

## 2023-04-12 LAB
BILIRUB UR QL STRIP: NEGATIVE
CLARITY UR: CLEAR
COLOR UR: YELLOW
FOLATE SERPL-MCNC: 18.4 NG/ML (ref 4.78–24.2)
GLUCOSE UR STRIP-MCNC: NEGATIVE MG/DL
HGB UR QL STRIP.AUTO: NEGATIVE
KETONES UR QL STRIP: NEGATIVE
LEUKOCYTE ESTERASE UR QL STRIP.AUTO: NEGATIVE
NITRITE UR QL STRIP: NEGATIVE
PH UR STRIP.AUTO: 6.5 [PH] (ref 5–8)
PROT UR QL STRIP: NEGATIVE
SP GR UR STRIP: 1.01 (ref 1–1.03)
UROBILINOGEN UR QL STRIP: NORMAL
VIT B12 BLD-MCNC: 478 PG/ML (ref 211–946)

## 2023-04-12 PROCEDURE — 86038 ANTINUCLEAR ANTIBODIES: CPT | Performed by: NURSE PRACTITIONER

## 2023-04-12 PROCEDURE — 36415 COLL VENOUS BLD VENIPUNCTURE: CPT | Performed by: NURSE PRACTITIONER

## 2023-04-12 PROCEDURE — 81003 URINALYSIS AUTO W/O SCOPE: CPT | Performed by: NURSE PRACTITIONER

## 2023-04-12 PROCEDURE — 3078F DIAST BP <80 MM HG: CPT | Performed by: NURSE PRACTITIONER

## 2023-04-12 PROCEDURE — 86235 NUCLEAR ANTIGEN ANTIBODY: CPT | Performed by: NURSE PRACTITIONER

## 2023-04-12 PROCEDURE — 99214 OFFICE O/P EST MOD 30 MIN: CPT | Performed by: NURSE PRACTITIONER

## 2023-04-12 PROCEDURE — 84425 ASSAY OF VITAMIN B-1: CPT | Performed by: NURSE PRACTITIONER

## 2023-04-12 PROCEDURE — 1159F MED LIST DOCD IN RCRD: CPT | Performed by: NURSE PRACTITIONER

## 2023-04-12 PROCEDURE — 1160F RVW MEDS BY RX/DR IN RCRD: CPT | Performed by: NURSE PRACTITIONER

## 2023-04-12 PROCEDURE — 82746 ASSAY OF FOLIC ACID SERUM: CPT | Performed by: NURSE PRACTITIONER

## 2023-04-12 PROCEDURE — 82607 VITAMIN B-12: CPT | Performed by: NURSE PRACTITIONER

## 2023-04-12 PROCEDURE — 80048 BASIC METABOLIC PNL TOTAL CA: CPT | Performed by: NURSE PRACTITIONER

## 2023-04-12 PROCEDURE — 3075F SYST BP GE 130 - 139MM HG: CPT | Performed by: NURSE PRACTITIONER

## 2023-04-12 PROCEDURE — 86225 DNA ANTIBODY NATIVE: CPT | Performed by: NURSE PRACTITIONER

## 2023-04-12 PROCEDURE — 84630 ASSAY OF ZINC: CPT | Performed by: NURSE PRACTITIONER

## 2023-04-12 RX ORDER — ATORVASTATIN CALCIUM 20 MG/1
20 TABLET, FILM COATED ORAL DAILY
Qty: 90 TABLET | Refills: 1 | Status: SHIPPED | OUTPATIENT
Start: 2023-04-12

## 2023-04-12 RX ORDER — PAROXETINE HYDROCHLORIDE 20 MG/1
20 TABLET, FILM COATED ORAL NIGHTLY
Qty: 90 TABLET | Refills: 1 | Status: SHIPPED | OUTPATIENT
Start: 2023-04-12

## 2023-04-12 RX ORDER — AMLODIPINE BESYLATE 5 MG/1
5 TABLET ORAL DAILY
Qty: 90 TABLET | Refills: 1 | Status: SHIPPED | OUTPATIENT
Start: 2023-04-12

## 2023-04-12 NOTE — PROGRESS NOTES
Venipuncture Blood Specimen Collection  Venipuncture performed in left arm  by Patricia Morris with good hemostasis. Patient tolerated the procedure well without complications.   04/12/23   Patricia Morris

## 2023-04-12 NOTE — PROGRESS NOTES
Chief Complaint  Hypertension, Anxiety, and tongue has burning sensation     Subjective            Cristiane Bishop presents to [facility]  History of Present Illness     Asiya presents to the office today to follow-up on chronic health conditions and for medication refills.    Essential hypertension with dyslipidemia, currently prescribed amlodipine 5 mg daily, in addition to lisinopril 20 mg daily, and Lipitor 20 mg nightly.  Blood pressure is normotensive on exam, 130/78.  She denies any chest pain, palpitations, headaches, shortness of breath, or lower extremity edema.  No complaints of myalgia with use of Lipitor.  She states that she does occasionally get dizzy, usually upon standing too quickly, or should her blood pressure get too low.  She does monitor her blood pressure at home.  She denies any presyncope or syncope.    She is taking Paxil for treatment of depression with anxiety.  She reports symptoms are stable.  She has taken this medication for quite some time.  No adverse side effects that she is aware of.    She does complain of dry mouth, which is chronic, as well as burning sensation of her tongue.  She states that she has had dry mouth for many years, but it does not seem to matter how much water she drinks, or if she chews gum, she still has dry mouth.  She is concerned for underlying issue which could be contributing to her symptoms.    Additionally, she would like a urinalysis, if possible.  She states sometimes she will wake up in the middle of the night with burning sensation in the suprapubic region and will need to urinate.  When she urinates the burning sensation is gone.  She does not really have the issue during the day, but generally only at night.    PHQ-2 Total Score: 0      Past Medical History:   Diagnosis Date   • Allergic rhinitis    • Breast cancer     RIGHT   • GERD (gastroesophageal reflux disease)    • HTN (hypertension)    • Hypertension     • Ingrown toenail    • Mood disorder        Allergies   Allergen Reactions   • Tetracycline Nausea Only and Rash     Difficulty swallowing     • Penicillins Rash   • Sulfa Antibiotics Rash        Past Surgical History:   Procedure Laterality Date   • ABDOMINAL HYSTERECTOMY     • APPENDECTOMY     • BREAST BIOPSY Right    • COLONOSCOPY N/A 8/30/2021    Procedure: COLONOSCOPY;  Surgeon: Norman Damon MD;  Location: [facility] ENDOSCOPY;  Service: Gastroenterology;  Laterality: N/A;  NORMAL COLON   • ENDOSCOPY N/A 8/30/2021    Procedure: ESOPHAGOGASTRODUODENOSCOPY WITH BIOPSY;  Surgeon: Norman Damon MD;  Location: [facility] ENDOSCOPY;  Service: Gastroenterology;  Laterality: N/A;  GASTRITIS/HIATAL HERNIA   • ENDOSCOPY N/A 9/27/2021    Procedure: ESOPHAGOGASTRODUODENOSCOPY with biopsies;  Surgeon: Norman Damon MD;  Location: [facility] ENDOSCOPY;  Service: Gastroenterology;  Laterality: N/A;  GASTRITIS, HIATAL HERNIA   • ENDOSCOPY N/A 1/17/2022    Procedure: ESOPHAGOGASTRODUODENOSCOPY WITH BX;  Surgeon: Norman Damon MD;  Location: [facility] ENDOSCOPY;  Service: Gastroenterology;  Laterality: N/A;  HIATAL HERNIA, REFULX ESOPHAGITIS   • HYSTERECTOMY     • MASTECTOMY     • MASTECTOMY Right 05/2015        Social History     Tobacco Use   • Smoking status: Never   • Smokeless tobacco: Never   Vaping Use   • Vaping Use: Never used   Substance Use Topics   • Alcohol use: Never   • Drug use: Never       Family History   Problem Relation Age of Onset   • Lymphoma Mother    • Breast cancer Mother    • Cancer Mother    • Lymphoma Maternal Uncle    • Diabetes Maternal Uncle    • Heart disease Other    • Diabetes Paternal Uncle    • Lymphoma Other    • Stroke Other    • Colon cancer Neg Hx    • Malig Hyperthermia Neg Hx         Health Maintenance Due   Topic Date Due   • ANNUAL WELLNESS VISIT  11/15/2022        Current Outpatient Medications on File Prior to Visit   Medication Sig   • aspirin 81 MG EC tablet Take 1  tablet by mouth Daily.   • Calcium Carb-Cholecalciferol (Calcium 600+D3) 600-200 MG-UNIT tablet Take 1 tablet by mouth Daily. Last dose 1 month ago   • lisinopril (PRINIVIL,ZESTRIL) 20 MG tablet TAKE 1 TABLET BY MOUTH TWICE DAILY   • loratadine (CLARITIN) 10 MG tablet Take 1 tablet by mouth Every Night.   • Menaquinone-7 (VITAMIN K2 PO) Take  by mouth.   • pantoprazole (PROTONIX) 40 MG EC tablet TAKE 1 TABLET BY MOUTH DAILY   • [DISCONTINUED] amLODIPine (NORVASC) 5 MG tablet Take 1 tablet by mouth Daily.   • [DISCONTINUED] atorvastatin (LIPITOR) 20 MG tablet TAKE 1 TABLET BY MOUTH DAILY   • [DISCONTINUED] docusate sodium (COLACE) 100 MG capsule Take 1 capsule by mouth Every Night.   • [DISCONTINUED] PARoxetine (PAXIL) 20 MG tablet Take 1 tablet by mouth Every Night.     No current facility-administered medications on file prior to visit.       Immunization History   Administered Date(s) Administered   • COVID-19 (MODERNA) BIVALENT BOOSTER 12+YRS 10/05/2022   • COVID-19 (PFIZER) PURPLE CAP 02/26/2021, 03/19/2021, 11/05/2021   • Covid-19 (Pfizer) Gray Cap 05/23/2022   • Fluad Quad 65+ 10/06/2020   • Fluzone High-Dose 65+yrs 11/05/2021, 10/05/2022   • Hepatitis A 07/10/2018, 04/01/2019   • Influenza, Unspecified 10/26/2017, 10/03/2018   • Pneumococcal Conjugate 13-Valent (PCV13) 10/17/2016   • Pneumococcal Polysaccharide (PPSV23) 03/26/2018   • Shingrix 10/06/2020, 12/05/2020   • Tdap 01/01/2014, 09/15/2014   • Zostavax 01/01/2014       Review of Systems     Objective     /78   Pulse 87   Temp 97.5 °F (36.4 °C)   Wt 56.7 kg (125 lb)   SpO2 97%   BMI 26.13 kg/m²       Physical Exam  Vitals reviewed.   Constitutional:       General: She is not in acute distress.     Appearance: Normal appearance. She is well-developed.   HENT:      Head: Normocephalic and atraumatic.      Mouth/Throat:      Lips: Pink.      Mouth: Mucous membranes are dry. No injury or oral lesions.      Dentition: Normal dentition.       Tongue: No lesions. Tongue does not deviate from midline.      Palate: No mass and lesions.      Pharynx: Oropharynx is clear. Uvula midline. No posterior oropharyngeal erythema or uvula swelling.   Eyes:      General: No scleral icterus.     Extraocular Movements: Extraocular movements intact.      Conjunctiva/sclera: Conjunctivae normal.   Neck:      Vascular: No carotid bruit.      Trachea: Trachea normal.   Cardiovascular:      Rate and Rhythm: Normal rate and regular rhythm.      Pulses: Normal pulses.      Heart sounds: No murmur heard.  Pulmonary:      Effort: Pulmonary effort is normal. No respiratory distress.      Breath sounds: Normal breath sounds. No wheezing, rhonchi or rales.   Musculoskeletal:         General: Normal range of motion.      Cervical back: Normal range of motion and neck supple. No tenderness.      Right lower leg: No edema.      Left lower leg: No edema.   Lymphadenopathy:      Cervical: No cervical adenopathy.   Skin:     General: Skin is warm and dry.   Neurological:      Mental Status: She is alert and oriented to person, place, and time.   Psychiatric:         Mood and Affect: Mood and affect normal.         Behavior: Behavior normal.         Thought Content: Thought content normal.         Judgment: Judgment normal.         Result Review :     The following data was reviewed by: ORLIN Denson on 04/12/2023:    CMP        7/7/2022    12:07 12/15/2022    08:39 3/27/2023    09:45   CMP   Glucose  94   95     BUN  16   21     Creatinine 0.80   0.92   1.02     EGFR 74.6   63.1   55.7     Sodium  139   137     Potassium  4.3   4.5     Chloride  101   101     Calcium  9.7   9.4     Total Protein  7.4   7.2     Albumin  4.20   4.1     Globulin  3.2   3.1     Total Bilirubin  0.5   0.7     Alkaline Phosphatase  118   107     AST (SGOT)  16   24     ALT (SGPT)  10   10     Albumin/Globulin Ratio  1.3   1.3     BUN/Creatinine Ratio  17.4   20.6     Anion Gap  9.6   11.0       CBC         5/18/2022    09:55   CBC   WBC 7.02     RBC 3.84     Hemoglobin 12.0     Hematocrit 36.9     MCV 96.1     MCH 31.3     MCHC 32.5     RDW 12.3     Platelets 301       Lipid Panel        12/15/2022    08:39 3/27/2023    09:45   Lipid Panel   Total Cholesterol 167   114     Triglycerides 180   73     HDL Cholesterol 36   44     VLDL Cholesterol 31   15     LDL Cholesterol  100   55     LDL/HDL Ratio 2.64   1.26       TSH        5/18/2022    09:55   TSH   TSH 3.820       Microalbumin        5/18/2022    09:55   Microalbumin   Microalbumin, Urine <1.2         Data reviewed: Radiologic studies :   Mammo Screening Modified With Tomosynthesis Left With CAD (09/09/2021 10:46)  DEXA Bone Density Axial (09/13/2022 12:08)  Mammo Screening Modified With Tomosynthesis Left With CAD (09/13/2022 12:32)           Assessment and Plan      Diagnoses and all orders for this visit:    1. White coat syndrome with diagnosis of hypertension (Primary)  -     amLODIPine (NORVASC) 5 MG tablet; Take 1 tablet by mouth Daily.  Dispense: 90 tablet; Refill: 1    2. Dyslipidemia  -     atorvastatin (LIPITOR) 20 MG tablet; Take 1 tablet by mouth Daily.  Dispense: 90 tablet; Refill: 1    3. Anxiety and depression  -     PARoxetine (PAXIL) 20 MG tablet; Take 1 tablet by mouth Every Night.  Dispense: 90 tablet; Refill: 1    4. Burning tongue  -     Vitamin B12 & Folate  -     Zinc  -     Vitamin B1, Whole Blood  -     OSMANY With / DsDNA, RNP, Sjogrens A / B, Smith    5. Nocturia  -     Urinalysis With Microscopic If Indicated (No Culture) - Urine, Clean Catch    6. Abnormal renal function  -     Basic metabolic panel            Follow Up     Return for Medicare Wellness.     Medication refill sent to the pharmacy today.  I will check OSMANY with Sjogren's antibodies, as well as B12/folate, zinc, and B1 to assess her dry mouth and burning tongue.  Urinalysis to assess bacteria.  I will contact her with results once received.    Patient was given  instructions and counseling regarding her condition or for health maintenance advice. Please see specific information pulled into the AVS if appropriate.

## 2023-04-13 LAB
ANA SER QL: POSITIVE
ANION GAP SERPL CALCULATED.3IONS-SCNC: 9.8 MMOL/L (ref 5–15)
BUN SERPL-MCNC: 17 MG/DL (ref 8–23)
BUN/CREAT SERPL: 17.2 (ref 7–25)
CALCIUM SPEC-SCNC: 9.5 MG/DL (ref 8.6–10.5)
CENTROMERE B AB SER-ACNC: <0.2 AI (ref 0–0.9)
CHLORIDE SERPL-SCNC: 97 MMOL/L (ref 98–107)
CHROMATIN AB SERPL-ACNC: 3.1 AI (ref 0–0.9)
CO2 SERPL-SCNC: 27.2 MMOL/L (ref 22–29)
CREAT SERPL-MCNC: 0.99 MG/DL (ref 0.57–1)
DSDNA AB SER-ACNC: 1 IU/ML (ref 0–9)
EGFRCR SERPLBLD CKD-EPI 2021: 57.8 ML/MIN/1.73
ENA JO1 AB SER-ACNC: <0.2 AI (ref 0–0.9)
ENA RNP AB SER-ACNC: 0.2 AI (ref 0–0.9)
ENA SCL70 AB SER-ACNC: <0.2 AI (ref 0–0.9)
ENA SM AB SER-ACNC: <0.2 AI (ref 0–0.9)
ENA SS-A AB SER-ACNC: >8 AI (ref 0–0.9)
ENA SS-B AB SER-ACNC: <0.2 AI (ref 0–0.9)
GLUCOSE SERPL-MCNC: 143 MG/DL (ref 65–99)
Lab: ABNORMAL
POTASSIUM SERPL-SCNC: 4.2 MMOL/L (ref 3.5–5.2)
SODIUM SERPL-SCNC: 134 MMOL/L (ref 136–145)

## 2023-04-14 LAB
VIT B1 BLD-SCNC: 180.6 NMOL/L (ref 66.5–200)
ZINC BLD-MCNC: 613 UG/DL (ref 440–860)

## 2023-04-19 DIAGNOSIS — R68.2 DRY MOUTH: ICD-10-CM

## 2023-04-19 DIAGNOSIS — R76.8 POSITIVE ANA (ANTINUCLEAR ANTIBODY): Primary | ICD-10-CM

## 2023-04-26 ENCOUNTER — OFFICE VISIT (OUTPATIENT)
Dept: FAMILY MEDICINE CLINIC | Facility: CLINIC | Age: 83
End: 2023-04-26
Payer: MEDICARE

## 2023-04-26 VITALS
HEART RATE: 87 BPM | OXYGEN SATURATION: 97 % | BODY MASS INDEX: 26.45 KG/M2 | DIASTOLIC BLOOD PRESSURE: 64 MMHG | SYSTOLIC BLOOD PRESSURE: 116 MMHG | HEIGHT: 58 IN | TEMPERATURE: 97.3 F | WEIGHT: 126 LBS

## 2023-04-26 DIAGNOSIS — Z00.00 MEDICARE ANNUAL WELLNESS VISIT, SUBSEQUENT: Primary | ICD-10-CM

## 2023-04-26 DIAGNOSIS — H91.93 BILATERAL HEARING LOSS, UNSPECIFIED HEARING LOSS TYPE: ICD-10-CM

## 2023-04-26 DIAGNOSIS — Z91.81 AT MODERATE RISK FOR FALL: ICD-10-CM

## 2023-04-26 PROCEDURE — 1170F FXNL STATUS ASSESSED: CPT | Performed by: NURSE PRACTITIONER

## 2023-04-26 PROCEDURE — 3078F DIAST BP <80 MM HG: CPT | Performed by: NURSE PRACTITIONER

## 2023-04-26 PROCEDURE — G0439 PPPS, SUBSEQ VISIT: HCPCS | Performed by: NURSE PRACTITIONER

## 2023-04-26 PROCEDURE — 3074F SYST BP LT 130 MM HG: CPT | Performed by: NURSE PRACTITIONER

## 2023-04-26 NOTE — PROGRESS NOTES
The ABCs of the Annual Wellness Visit  Subsequent Medicare Wellness Visit    Subjective         Cristiane Bishop is a 82 y.o. female who presents for a Subsequent Medicare Wellness Visit.    The following portions of the patient's history were reviewed and updated as appropriate: allergies, current medications, past family history, past medical history, past social history, past surgical history and problem list.    Compared to one year ago, the patient feels her physical health is worse. She feels like it is down a little bit d/t she does not have the energy that she used to.     Compared to one year ago, the patient feels her mental health is the same.    Recent Hospitalizations:  She was not admitted to the hospital during the last year.       Current Medical Providers:  Patient Care Team:  Azalea Abdullahi APRN as PCP - General (Nurse Practitioner)  Tate Humphries MD as Consulting Physician (Hematology and Oncology)  Collette Mann APRN as Nurse Practitioner (Nurse Practitioner)    Outpatient Medications Prior to Visit   Medication Sig Dispense Refill   • amLODIPine (NORVASC) 5 MG tablet Take 1 tablet by mouth Daily. 90 tablet 1   • aspirin 81 MG EC tablet Take 1 tablet by mouth Daily.     • atorvastatin (LIPITOR) 20 MG tablet Take 1 tablet by mouth Daily. 90 tablet 1   • Calcium Carb-Cholecalciferol (Calcium 600+D3) 600-200 MG-UNIT tablet Take 1 tablet by mouth Daily. Last dose 1 month ago     • lisinopril (PRINIVIL,ZESTRIL) 20 MG tablet TAKE 1 TABLET BY MOUTH TWICE DAILY 180 tablet 1   • loratadine (CLARITIN) 10 MG tablet Take 1 tablet by mouth Every Night.     • Menaquinone-7 (VITAMIN K2 PO) Take  by mouth.     • pantoprazole (PROTONIX) 40 MG EC tablet TAKE 1 TABLET BY MOUTH DAILY 90 tablet 1   • PARoxetine (PAXIL) 20 MG tablet Take 1 tablet by mouth Every Night. 90 tablet 1     No facility-administered medications prior to visit.       No opioid medication identified on active medication  "list. I have reviewed chart for other potential  high risk medication/s and harmful drug interactions in the elderly.          Aspirin is on active medication list. Aspirin use is indicated based on review of current medical condition/s. Pros and cons of this therapy have been discussed today. Benefits of this medication outweigh potential harm.  Patient has been encouraged to continue taking this medication.      Patient Active Problem List   Diagnosis   • Breast cancer in female   • Heel pain   • Allergic rhinitis   • Esophageal reflux   • Heel spur   • History of breast cancer   • Essential hypertension   • HTN (hypertension)   • Ingrown toenail   • Depression   • Mood disorder   • Osteopenia   • Bacterial infection due to H. pylori   • Iron deficiency anemia due to chronic blood loss   • Heartburn   • Abnormal CT scan, stomach     Advance Care Planning   Advance Care Planning     Advance Directive is on file.  ACP discussion was held with the patient during this visit. Patient has an advance directive in EMR which is still valid.      Objective    Vitals:    04/26/23 1347   BP: 116/64   Pulse: 87   Temp: 97.3 °F (36.3 °C)   SpO2: 97%   Weight: 57.2 kg (126 lb)   Height: 147.3 cm (58\")     Estimated body mass index is 26.33 kg/m² as calculated from the following:    Height as of this encounter: 147.3 cm (58\").    Weight as of this encounter: 57.2 kg (126 lb).    BMI is >= 25 and <30. (Overweight) The following options were offered after discussion;: weight loss educational material (shared in after visit summary)      Does the patient have evidence of cognitive impairment? No    Lab Results   Component Value Date    TRIG 73 03/27/2023    HDL 44 03/27/2023    LDL 55 03/27/2023    VLDL 15 03/27/2023          HEALTH RISK ASSESSMENT    Smoking Status:  Social History     Tobacco Use   Smoking Status Never   Smokeless Tobacco Never     Alcohol Consumption:  Social History     Substance and Sexual Activity   Alcohol " Use Never     Fall Risk Screen:    STEADI Fall Risk Assessment was completed, and patient is at MODERATE risk for falls. Assessment completed on:2023    Depression Screenin/26/2023     1:50 PM   PHQ-2/PHQ-9 Depression Screening   Little Interest or Pleasure in Doing Things 0-->not at all   Feeling Down, Depressed or Hopeless 0-->not at all   PHQ-9: Brief Depression Severity Measure Score 0       Health Habits and Functional and Cognitive Screenin/26/2023     1:48 PM   Functional & Cognitive Status   Do you have difficulty preparing food and eating? No   Do you have difficulty bathing yourself, getting dressed or grooming yourself? No   Do you have difficulty using the toilet? No   Do you have difficulty moving around from place to place? No   Do you have trouble with steps or getting out of a bed or a chair? No   Current Diet Well Balanced Diet   Dental Exam Up to date   Eye Exam Up to date   Exercise (times per week) 2 times per week   Current Exercises Include Yard Work   Do you need help using the phone?  No   Are you deaf or do you have serious difficulty hearing?  Yes   Do you need help with transportation? No   Do you need help shopping? No   Do you need help preparing meals?  No   Do you need help with housework?  No   Do you need help with laundry? No   Do you need help taking your medications? No   Do you need help managing money? No   Do you ever drive or ride in a car without wearing a seat belt? No   Have you felt unusual stress, anger or loneliness in the last month? No   Who do you live with? Child   If you need help, do you have trouble finding someone available to you? No   Have you been bothered in the last four weeks by sexual problems? No   Do you have difficulty concentrating, remembering or making decisions? No       Age-appropriate Screening Schedule:  Refer to the list below for future screening recommendations based on patient's age, sex and/or medical conditions.  Orders for these recommended tests are listed in the plan section. The patient has been provided with a written plan.    Health Maintenance   Topic Date Due   • INFLUENZA VACCINE  08/01/2023   • MAMMOGRAM  09/13/2023   • ANNUAL WELLNESS VISIT  04/26/2024   • DXA SCAN  09/13/2024   • TDAP/TD VACCINES (3 - Td or Tdap) 09/15/2024   • COVID-19 Vaccine  Completed   • Pneumococcal Vaccine 65+  Completed   • ZOSTER VACCINE  Completed                  CMS Preventative Services Quick Reference  Risk Factors Identified During Encounter:    Fall Risk-High or Moderate: Discussed Fall Prevention in the home, Information on Fall Prevention Shared in After Visit Summary and Sit to Stand Exercise Information Shared in After Visit Summary  Hearing Problem: She is planning to go to [facility] to get hearing aids; declines audiologist referral  Dental Screening Recommended  Vision Screening Recommended    The above risks/problems have been discussed with the patient.  Pertinent information has been shared with the patient in the After Visit Summary.    Diagnoses and all orders for this visit:    1. Medicare annual wellness visit, subsequent (Primary)    2. At moderate risk for fall    3. Bilateral hearing loss, unspecified hearing loss type        Follow Up:   Next Medicare Wellness visit to be scheduled in 1 year.      An After Visit Summary and PPPS were made available to the patient.

## 2023-04-26 NOTE — PATIENT INSTRUCTIONS
Fall Prevention in the Home, Adult  Falls can cause injuries and affect people of all ages. There are many simple things that you can do to make your home safe and to help prevent falls. Ask for help when making these changes, if needed.  What actions can I take to prevent falls?  General instructions  • Use good lighting in all rooms. Replace any light bulbs that burn out, turn on lights if it is dark, and use night-lights.  • Place frequently used items in easy-to-reach places. Lower the shelves around your home if necessary.  • Set up furniture so that there are clear paths around it. Avoid moving your furniture around.  • Remove throw rugs and other tripping hazards from the floor.  • Avoid walking on wet floors.  • Fix any uneven floor surfaces.  • Add color or contrast paint or tape to grab bars and handrails in your home. Place contrasting color strips on the first and last steps of staircases.  • When you use a stepladder, make sure that it is completely opened and that the sides and supports are firmly locked. Have someone hold the ladder while you are using it. Do not climb a closed stepladder.  • Know where your pets are when moving through your home.  What can I do in the bathroom?     • Keep the floor dry. Immediately clean up any water that is on the floor.  • Remove soap buildup in the tub or shower regularly.  • Use nonskid mats or decals on the floor of the tub or shower.  • Attach bath mats securely with double-sided, nonslip rug tape.  • If you need to sit down while you are in the shower, use a plastic, nonslip stool.  • Install grab bars by the toilet and in the tub and shower. Do not use towel bars as grab bars.  What can I do in the bedroom?  • Make sure that a bedside light is easy to reach.  • Do not use oversized bedding that reaches the floor.  • Have a firm chair that has side arms to use for getting dressed.  What can I do in the kitchen?  • Clean up any spills right away.  • If you  need to reach for something above you, use a sturdy step stool that has a grab bar.  • Keep electrical cables out of the way.  • Do not use floor polish or wax that makes floors slippery. If you must use wax, make sure that it is non-skid floor wax.  What can I do with my stairs?  • Do not leave any items on the stairs.  • Make sure that you have a light switch at the top and the bottom of the stairs. Have them installed if you do not have them.  • Make sure that there are handrails on both sides of the stairs. Fix handrails that are broken or loose. Make sure that handrails are as long as the staircases.  • Install non-slip stair treads on all stairs in your home.  • Avoid having throw rugs at the top or bottom of stairs, or secure the rugs with carpet tape to prevent them from moving.  • Choose a carpet design that does not hide the edge of steps on the stairs.  • Check any carpeting to make sure that it is firmly attached to the stairs. Fix any carpet that is loose or worn.  What can I do on the outside of my home?  • Use bright outdoor lighting.  • Regularly repair the edges of walkways and driveways and fix any cracks.  • Remove high doorway thresholds.  • Trim any shrubbery on the main path into your home.  • Regularly check that handrails are securely fastened and in good repair. Both sides of all steps should have handrails.  • Install guardrails along the edges of any raised decks or porches.  • Clear walkways of debris and clutter, including tools and rocks.  • Have leaves, snow, and ice cleared regularly.  • Use sand or salt on walkways during winter months.  • In the garage, clean up any spills right away, including grease or oil spills.  What other actions can I take?  • Wear closed-toe shoes that fit well and support your feet. Wear shoes that have rubber soles or low heels.  • Use mobility aids as needed, such as canes, walkers, scooters, and crutches.  • Review your medicines with your health care  provider. Some medicines can cause dizziness or changes in blood pressure, which increase your risk of falling.  Talk with your health care provider about other ways that you can decrease your risk of falls. This may include working with a physical therapist or  to improve your strength, balance, and endurance.  Where to find more information  • Centers for Disease Control and Prevention, STEADI: www.cdc.gov  • National Spencer on Aging: www.sarah.nih.gov  Contact a health care provider if:  • You are afraid of falling at home.  • You feel weak, drowsy, or dizzy at home.  • You fall at home.  Summary  • There are many simple things that you can do to make your home safe and to help prevent falls.  • Ways to make your home safe include removing tripping hazards and installing grab bars in the bathroom.  • Ask for help when making these changes in your home.  This information is not intended to replace advice given to you by your health care provider. Make sure you discuss any questions you have with your health care provider.  Document Revised: 09/19/2022 Document Reviewed: 07/21/2021  [employer] Patient Education © 2022 [employer]      Sit-to-Stand Exercise  The sit-to-stand exercise (also known as the chair stand or chair rise exercise) strengthens your lower body and helps you maintain or improve your mobility and independence. The end goal is to do the sit-to-stand exercise without using your hands. This will be easier as you become stronger. You should always talk with your health care provider before starting any exercise program, especially if you have had recent surgery.  Do the exercise exactly as told by your health care provider and adjust it as directed. It is normal to feel mild stretching, pulling, tightness, or discomfort as you do this exercise, but you should stop right away if you feel sudden pain or your pain gets worse. Do not begin doing this exercise until told by your health care  provider.  What the sit-to-stand exercise does  The sit-to-stand exercise helps to strengthen the muscles in your thighs and the muscles in the center of your body that give you stability (core muscles). This exercise is especially helpful if:  • You have had knee or hip surgery.  • You have trouble getting up from a chair, out of a car, or off the toilet due to muscle weakness.  How to do the sit-to-stand exercise  1. Sit toward the front edge of a sturdy chair without armrests. Your knees should be bent and your feet should be flat on the floor and shoulder-width apart and underneath your hips.  2. Place your hands lightly on each side of the seat. Keep your back and neck as straight as possible, with your chest slightly forward.  3. Breathe in slowly. Lean forward and slightly shift your weight to the front of your feet.  4. Breathe out as you slowly stand up. Try not to support any weight with your hands.  5. Stand and pause for a full breath in and out.  6. Breathe in as you sit down slowly. Tighten your core and abdominal muscles to control your lowering as much as possible. You should lower yourself back to the chair slowly, not just drop back into the seat.  7. Breathe out slowly.  8. Do this exercise 10-15 times. If needed, do it fewer times until you build up strength.  9. Rest for 1 minute, then do another set of 10-15 repetitions.  To change the difficulty of the sit-to-stand exercise  • If the exercise is too difficult, use a chair with sturdy armrests, and push off the armrests to help you come to the standing position. You can also use the armrests to help slowly lower yourself back to sitting. As this gets easier, try to use your arms less. You can also place a firm cushion or pillow on the chair to make the surface higher.  • If this exercise is too easy, do not use your arms to help raise or lower yourself. You can also wear a weighted vest, use hand weights, increase your repetitions, or try a  lower chair.  General tips  • You may feel tired when starting an exercise routine. This is normal.  • You may have muscle soreness that lasts a few days. This is normal. As you get stronger, you may not feel muscle soreness.  • Use smooth, steady movements.  • Do not  hold your breath during strength exercises. This can cause unsafe changes in your blood pressure.  • Breathe in slowly through your nose, and breathe out slowly through your mouth.  Summary  • Strengthening your lower body is an important step to help you move safely and independently.  • The sit-to-stand exercise helps strengthen the muscles in your thighs and core.  • You should always talk with your health care provider before starting any exercise program, especially if you have had recent surgery.  This information is not intended to replace advice given to you by your health care provider. Make sure you discuss any questions you have with your health care provider.  Document Revised: 04/10/2022 Document Reviewed: 04/10/2022  [employer] Patient Education © 2022 [employer]      Exercising to Stay Healthy  To become healthy and stay healthy, it is recommended that you do moderate-intensity and vigorous-intensity exercise. You can tell that you are exercising at a moderate intensity if your heart starts beating faster and you start breathing faster but can still hold a conversation. You can tell that you are exercising at a vigorous intensity if you are breathing much harder and faster and cannot hold a conversation while exercising.  How can exercise benefit me?  Exercising regularly is important. It has many health benefits, such as:  • Improving overall fitness, flexibility, and endurance.  • Increasing bone density.  • Helping with weight control.  • Decreasing body fat.  • Increasing muscle strength and endurance.  • Reducing stress and tension, anxiety, depression, or anger.  • Improving overall health.  What guidelines should I follow while  exercising?  • Before you start a new exercise program, talk with your health care provider.  • Do not exercise so much that you hurt yourself, feel dizzy, or get very short of breath.  • Wear comfortable clothes and wear shoes with good support.  • Drink plenty of water while you exercise to prevent dehydration or heat stroke.  • Work out until your breathing and your heartbeat get faster (moderate intensity).  How often should I exercise?  Choose an activity that you enjoy, and set realistic goals. Your health care provider can help you make an activity plan that is individually designed and works best for you.  Exercise regularly as told by your health care provider. This may include:  • Doing strength training two times a week, such as:  ? Lifting weights.  ? Using resistance bands.  ? Push-ups.  ? Sit-ups.  ? Yoga.  • Doing a certain intensity of exercise for a given amount of time. Choose from these options:  ? A total of 150 minutes of moderate-intensity exercise every week.  ? A total of 75 minutes of vigorous-intensity exercise every week.  ? A mix of moderate-intensity and vigorous-intensity exercise every week.  Children, pregnant women, people who have not exercised regularly, people who are overweight, and older adults may need to talk with a health care provider about what activities are safe to perform. If you have a medical condition, be sure to talk with your health care provider before you start a new exercise program.  What are some exercise ideas?  Moderate-intensity exercise ideas include:  • Walking 1 mile (1.6 km) in about 15 minutes.  • Biking.  • Hiking.  • Golfing.  • Dancing.  • Water aerobics.  Vigorous-intensity exercise ideas include:  • Walking 4.5 miles (7.2 km) or more in about 1 hour.  • Jogging or running 5 miles (8 km) in about 1 hour.  • Biking 10 miles (16.1 km) or more in about 1 hour.  • Lap swimming.  • Roller-skating or in-line skating.  • Cross-country skiing.  • Vigorous  competitive sports, such as football, basketball, and soccer.  • Jumping rope.  • Aerobic dancing.  What are some everyday activities that can help me get exercise?  • Yard work, such as:  ? Pushing a .  ? Raking and bagging leaves.  • Washing your car.  • Pushing a stroller.  • Shoveling snow.  • Gardening.  • Washing windows or floors.  How can I be more active in my day-to-day activities?  • Use stairs instead of an elevator.  • Take a walk during your lunch break.  • If you drive, park your car farther away from your work or school.  • If you take public transportation, get off one stop early and walk the rest of the way.  • Stand up or walk around during all of your indoor phone calls.  • Get up, stretch, and walk around every 30 minutes throughout the day.  • Enjoy exercise with a friend. Support to continue exercising will help you keep a regular routine of activity.  Where to find more information  You can find more information about exercising to stay healthy from:  • [place] Department of Health and Human Services: www.hhs.gov  • Centers for Disease Control and Prevention (CDC): www.cdc.gov  Summary  • Exercising regularly is important. It will improve your overall fitness, flexibility, and endurance.  • Regular exercise will also improve your overall health. It can help you control your weight, reduce stress, and improve your bone density.  • Do not exercise so much that you hurt yourself, feel dizzy, or get very short of breath.  • Before you start a new exercise program, talk with your health care provider.  This information is not intended to replace advice given to you by your health care provider. Make sure you discuss any questions you have with your health care provider.  Document Revised: 04/15/2022 Document Reviewed: 04/15/2022  [employer] Patient Education © 2022 [employer]

## 2023-05-10 ENCOUNTER — TELEPHONE (OUTPATIENT)
Dept: FAMILY MEDICINE CLINIC | Facility: CLINIC | Age: 83
End: 2023-05-10
Payer: MEDICARE

## 2023-05-10 NOTE — TELEPHONE ENCOUNTER
"Caller: Cristiane Bishop \"GAMA\"    Relationship: Self    Best call back number: [phone]    What is the medical concern/diagnosis: RHEUMATOID ARTHRITIS    What is the office location: [place] [place] BUT NOT [address]    Any additional details:   PATIENT HAS REFERRAL FOR RHEUMATOID ARTHRITIS BUT SHE DOES NOT WANT TO SEE [facility], SHE PREFERS SOMEONE ELSE.       "

## 2023-05-15 ENCOUNTER — TELEPHONE (OUTPATIENT)
Dept: FAMILY MEDICINE CLINIC | Facility: CLINIC | Age: 83
End: 2023-05-15
Payer: MEDICARE

## 2023-05-15 NOTE — TELEPHONE ENCOUNTER
Takes lisinopril in the evenings and amlodipine (has been cutting in half) in the mornings. She said that this morning her blood pressure was 94/50 and now it is 96/51, I asked about symptoms of dizziness weakness etc and she said she just doesn't feel like she has any energy. She is wanting to know what she should be doing with her blood pressure medications as she feels it is running too low.

## 2023-05-16 ENCOUNTER — TELEPHONE (OUTPATIENT)
Dept: FAMILY MEDICINE CLINIC | Facility: CLINIC | Age: 83
End: 2023-05-16
Payer: MEDICARE

## 2023-05-16 NOTE — TELEPHONE ENCOUNTER
BP this morning was 133/67 after holding her night dose of lisinopril. She took her blood pressure this morning prior to taking her 1/2 tablet of amlodipine.

## 2023-06-08 ENCOUNTER — OFFICE VISIT (OUTPATIENT)
Dept: FAMILY MEDICINE CLINIC | Facility: CLINIC | Age: 83
End: 2023-06-08
Payer: MEDICARE

## 2023-06-08 VITALS
TEMPERATURE: 97 F | WEIGHT: 125 LBS | HEART RATE: 82 BPM | OXYGEN SATURATION: 98 % | DIASTOLIC BLOOD PRESSURE: 78 MMHG | SYSTOLIC BLOOD PRESSURE: 120 MMHG | BODY MASS INDEX: 26.13 KG/M2

## 2023-06-08 DIAGNOSIS — R76.8 POSITIVE ANA (ANTINUCLEAR ANTIBODY): ICD-10-CM

## 2023-06-08 DIAGNOSIS — R68.2 DRY MOUTH: ICD-10-CM

## 2023-06-08 DIAGNOSIS — I10 WHITE COAT SYNDROME WITH DIAGNOSIS OF HYPERTENSION: ICD-10-CM

## 2023-06-08 DIAGNOSIS — H61.21 IMPACTED CERUMEN OF RIGHT EAR: Primary | ICD-10-CM

## 2023-06-08 NOTE — PROGRESS NOTES
"Chief Complaint  Ear Fullness (RT ear )    Subjective            Cristiane Bishop presents to [facility]  History of Present Illness    Patient presents today wanting ears \"cleaned out\". Patient states she went to the hearing specialist and he told her she had a lot of wax build up. Patient states she has hearing aides but she has only wore them one time. Patient reports ringing in ears but it is not all of the time.    Patient states she was sent to a \"bone\" doctor for an appointment. Patient wants to know \"why\" and if she needs to see one, she wants a refferal to a different one.    Also, since seeing me last she has stopped taking amlodipine for hypertension.  She has also reduced her lisinopril from 40 mg daily to once daily.  Blood pressure is normotensive on exam, 120/78.  She is no longer having dizziness upon standing or fatigue.  Blood pressure readings at home have also been normal.      Past Medical History:   Diagnosis Date    Allergic rhinitis     Breast cancer     RIGHT    GERD (gastroesophageal reflux disease)     HTN (hypertension)     Hypertension     Ingrown toenail     Mood disorder        Allergies   Allergen Reactions    Tetracycline Nausea Only and Rash     Difficulty swallowing      Penicillins Rash    Sulfa Antibiotics Rash        Past Surgical History:   Procedure Laterality Date    ABDOMINAL HYSTERECTOMY      APPENDECTOMY      BREAST BIOPSY Right     COLONOSCOPY N/A 8/30/2021    Procedure: COLONOSCOPY;  Surgeon: Norman Damon MD;  Location: [facility] ENDOSCOPY;  Service: Gastroenterology;  Laterality: N/A;  NORMAL COLON    ENDOSCOPY N/A 8/30/2021    Procedure: ESOPHAGOGASTRODUODENOSCOPY WITH BIOPSY;  Surgeon: Norman Damon MD;  Location: [facility] ENDOSCOPY;  Service: Gastroenterology;  Laterality: N/A;  GASTRITIS/HIATAL HERNIA    ENDOSCOPY N/A 9/27/2021    Procedure: ESOPHAGOGASTRODUODENOSCOPY with biopsies;  Surgeon: Norman Damon MD;  " Location: [facility] ENDOSCOPY;  Service: Gastroenterology;  Laterality: N/A;  GASTRITIS, HIATAL HERNIA    ENDOSCOPY N/A 1/17/2022    Procedure: ESOPHAGOGASTRODUODENOSCOPY WITH BX;  Surgeon: Norman Damon MD;  Location: [facility] ENDOSCOPY;  Service: Gastroenterology;  Laterality: N/A;  HIATAL HERNIA, REFULX ESOPHAGITIS    HYSTERECTOMY      MASTECTOMY      MASTECTOMY Right 05/2015        Social History     Tobacco Use    Smoking status: Never    Smokeless tobacco: Never   Vaping Use    Vaping Use: Never used   Substance Use Topics    Alcohol use: Never    Drug use: Never       Family History   Problem Relation Age of Onset    Lymphoma Mother     Breast cancer Mother     Cancer Mother     Lymphoma Maternal Uncle     Diabetes Maternal Uncle     Heart disease Other     Diabetes Paternal Uncle     Lymphoma Other     Stroke Other     Colon cancer Neg Hx     Malig Hyperthermia Neg Hx         Health Maintenance Due   Topic Date Due    COVID-19 Vaccine (6 - Pfizer series) 02/05/2023        Current Outpatient Medications on File Prior to Visit   Medication Sig    aspirin 81 MG EC tablet Take 1 tablet by mouth Daily.    atorvastatin (LIPITOR) 20 MG tablet Take 1 tablet by mouth Daily.    Calcium Carb-Cholecalciferol (Calcium 600+D3) 600-200 MG-UNIT tablet Take 1 tablet by mouth Daily. Last dose 1 month ago    loratadine (CLARITIN) 10 MG tablet Take 1 tablet by mouth Every Night.    Menaquinone-7 (VITAMIN K2 PO) Take  by mouth.    pantoprazole (PROTONIX) 40 MG EC tablet TAKE 1 TABLET BY MOUTH DAILY    PARoxetine (PAXIL) 20 MG tablet Take 1 tablet by mouth Every Night.    lisinopril (PRINIVIL,ZESTRIL) 20 MG tablet TAKE 1 TABLET BY MOUTH TWICE DAILY (Patient taking differently: Daily. Pt takes one pill qd)    [DISCONTINUED] amLODIPine (NORVASC) 5 MG tablet Take 1 tablet by mouth Daily.     No current facility-administered medications on file prior to visit.       Immunization History   Administered Date(s) Administered     COVID-19 (MODERNA) BIVALENT 12+YRS 10/05/2022    COVID-19 (PFIZER) Purple Cap Monovalent 02/26/2021, 03/19/2021, 11/05/2021    Covid-19 (Pfizer) Gray Cap Monovalent 05/23/2022    Fluad Quad 65+ 10/06/2020    Fluzone High-Dose 65+yrs 11/05/2021, 10/05/2022    Hepatitis A 07/10/2018, 04/01/2019    Influenza, Unspecified 10/26/2017, 10/03/2018    Pneumococcal Conjugate 13-Valent (PCV13) 10/17/2016    Pneumococcal Polysaccharide (PPSV23) 03/26/2018    Shingrix 10/06/2020, 12/05/2020    Tdap 01/01/2014, 09/15/2014    Zostavax 01/01/2014       Review of Systems   Constitutional:  Negative for fever.   HENT:  Positive for hearing loss and tinnitus. Negative for ear discharge and ear pain.       Objective     /78   Pulse 82   Temp 97 °F (36.1 °C)   Wt 56.7 kg (125 lb)   SpO2 98%   BMI 26.13 kg/m²       Physical Exam  Constitutional:       General: She is not in acute distress.     Appearance: Normal appearance.   HENT:      Head: Normocephalic and atraumatic.      Right Ear: Tympanic membrane, ear canal and external ear normal. Decreased hearing noted. There is impacted cerumen.      Left Ear: Tympanic membrane, ear canal and external ear normal.   Eyes:      General: No scleral icterus.        Right eye: No discharge.         Left eye: No discharge.      Extraocular Movements: Extraocular movements intact.      Conjunctiva/sclera: Conjunctivae normal.   Pulmonary:      Effort: Pulmonary effort is normal.   Musculoskeletal:         General: Normal range of motion.      Cervical back: Normal range of motion.      Right lower leg: No edema.      Left lower leg: No edema.   Skin:     General: Skin is warm and dry.   Neurological:      General: No focal deficit present.      Mental Status: She is alert.   Psychiatric:         Mood and Affect: Mood normal.         Behavior: Behavior normal.     Result Review :     The following data was reviewed by: ORLIN Denson on 06/08/2023:    Common labs           12/15/2022    08:39 3/27/2023    09:45 4/12/2023    15:10   Common Labs   Glucose 94  95  143    BUN 16  21  17    Creatinine 0.92  1.02  0.99    Sodium 139  137  134    Potassium 4.3  4.5  4.2    Chloride 101  101  97    Calcium 9.7  9.4  9.5    Albumin 4.20  4.1     Total Bilirubin 0.5  0.7     Alkaline Phosphatase 118  107     AST (SGOT) 16  24     ALT (SGPT) 10  10     Total Cholesterol 167  114     Triglycerides 180  73     HDL Cholesterol 36  44     LDL Cholesterol  100  55       OSMANY With / DsDNA, RNP, Sjogrens A / B, Jones (04/12/2023 15:10)          Ear Cerumen Removal    Date/Time: 6/8/2023 3:33 PM  Performed by: Azalea Abdullahi APRN  Authorized by: Azalea Abdullahi APRN     Anesthesia:  Local Anesthetic: none  Cerumenolytics applied prior to procedure: none.  Location details: right ear  Patient tolerance: patient tolerated the procedure well with no immediate complications  Comments: Status post irrigation by the MA the TM on the right was WNL - EAC free from cerumen impaction.   Procedure type: irrigation   Sedation:  Patient sedated: no              Assessment and Plan      Diagnoses and all orders for this visit:    1. Impacted cerumen of right ear (Primary)  -     Ear Cerumen Removal    2. Positive OSMANY (antinuclear antibody)  Comments:  She does not want to see Dr. Campa - advised to speak with referrals  for referral to Dr. Huffman.    3. Dry mouth    4. White coat syndrome with diagnosis of hypertension  Comments:  Continue lisinopril 20mg daily - continue to monitor BP at home.            Follow Up     Return if symptoms worsen or fail to improve.    Patient was given instructions and counseling regarding her condition or for health maintenance advice. Please see specific information pulled into the AVS if appropriate.     I was present with the nurse practitioner student, Ila Reed, for the service. I personally verified the history of present illness and performed the physical  examination and medical decision making. I have verified all of the medical student's documentation for this encounter.

## 2023-08-17 ENCOUNTER — TELEPHONE (OUTPATIENT)
Dept: FAMILY MEDICINE CLINIC | Facility: CLINIC | Age: 83
End: 2023-08-17

## 2023-08-17 DIAGNOSIS — F41.9 ANXIETY AND DEPRESSION: ICD-10-CM

## 2023-08-17 DIAGNOSIS — F32.A ANXIETY AND DEPRESSION: ICD-10-CM

## 2023-08-17 RX ORDER — PAROXETINE HYDROCHLORIDE 20 MG/1
20 TABLET, FILM COATED ORAL NIGHTLY
Qty: 90 TABLET | Refills: 1 | OUTPATIENT
Start: 2023-08-17

## 2023-08-17 NOTE — TELEPHONE ENCOUNTER
"  Caller: Cristiane Bishop \"GAMA\"    Relationship: Self    Best call back number:  [phone]     What is the best time to reach you: ANY    Who are you requesting to speak with (clinical staff, provider,  specific staff member): CLINICAL     What was the call regarding: PATIENT STATED THAT THE MEDICATION PAROXETINE 20 MG WAS DENIED AND NEEDING TO KNOW IF APPOINTMENT WAS NEEDED. THERE WAS NO INFORMATION THAT I COULD SHARE AND KAVITA INFORMED THAT A MESSAGE NEEDED TO GO TO THE CHART AS THERE ARE PHONE ISSUES IN THE OFFICE.     Is it okay if the provider responds through MyChart: NO            "

## 2023-08-24 ENCOUNTER — TELEPHONE (OUTPATIENT)
Dept: FAMILY MEDICINE CLINIC | Facility: CLINIC | Age: 83
End: 2023-08-24

## 2023-08-24 NOTE — TELEPHONE ENCOUNTER
"Caller: Cristiane Bishop \"GAMA\"    Relationship to patient: Self    Best call back number: [phone]     Patient is needing: PATIENT WANTED TO ADVISE SHE DID GO AND SEE AN ARTHRITIS DOCTOR. HE STATES SHE DOESN'T HAVE DRY MOUTH  DR. ANGIE ARTEAGA  LOCATED ON [facility]          "

## 2023-08-28 DIAGNOSIS — K21.9 GASTROESOPHAGEAL REFLUX DISEASE, UNSPECIFIED WHETHER ESOPHAGITIS PRESENT: ICD-10-CM

## 2023-08-28 RX ORDER — PANTOPRAZOLE SODIUM 40 MG/1
40 TABLET, DELAYED RELEASE ORAL DAILY
Qty: 90 TABLET | Refills: 0 | Status: SHIPPED | OUTPATIENT
Start: 2023-08-28

## 2023-08-28 NOTE — TELEPHONE ENCOUNTER
"    Caller: Cristiane Bishop \"GAMA\"    Relationship: Self    Best call back number: [phone]    Requested Prescriptions:   Requested Prescriptions     Pending Prescriptions Disp Refills    pantoprazole (PROTONIX) 40 MG EC tablet [Pharmacy Med Name: PANTOPRAZOLE 40MG TABLETS] 90 tablet 1     Sig: TAKE 1 TABLET BY MOUTH DAILY        Pharmacy where request should be sent: [id number] [address] AT [facility] [phone] [facility] [phone]      Last office visit with prescribing clinician: 6/8/2023   Last telemedicine visit with prescribing clinician: Visit date not found   Next office visit with prescribing clinician: Visit date not found     Additional details provided by patient: PATIENT HAS 2 PILLS LEFT    Does the patient have less than a 3 day supply:  [x] Yes  [] No    Would you like a call back once the refill request has been completed: [] Yes [x] No    If the office needs to give you a call back, can they leave a voicemail: [] Yes [x] No    Kristen Morris Rep   08/28/23 11:50 EDT           "

## 2023-09-15 ENCOUNTER — HOSPITAL ENCOUNTER (OUTPATIENT)
Dept: MAMMOGRAPHY | Facility: HOSPITAL | Age: 83
Discharge: HOME | End: 2023-09-15
Admitting: NURSE PRACTITIONER
Payer: MEDICARE

## 2023-09-15 DIAGNOSIS — Z12.31 ENCOUNTER FOR SCREENING MAMMOGRAM FOR MALIGNANT NEOPLASM OF BREAST: ICD-10-CM

## 2023-09-15 PROCEDURE — 77067 SCR MAMMO BI INCL CAD: CPT

## 2023-09-15 PROCEDURE — 77063 BREAST TOMOSYNTHESIS BI: CPT

## 2023-09-21 ENCOUNTER — OFFICE VISIT (OUTPATIENT)
Dept: ONCOLOGY | Facility: HOSPITAL | Age: 83
End: 2023-09-21
Payer: MEDICARE

## 2023-09-21 VITALS
OXYGEN SATURATION: 99 % | DIASTOLIC BLOOD PRESSURE: 56 MMHG | RESPIRATION RATE: 18 BRPM | SYSTOLIC BLOOD PRESSURE: 124 MMHG | WEIGHT: 120.59 LBS | HEIGHT: 58 IN | BODY MASS INDEX: 25.31 KG/M2 | HEART RATE: 95 BPM | TEMPERATURE: 97.8 F

## 2023-09-21 DIAGNOSIS — D64.9 ANEMIA, UNSPECIFIED TYPE: ICD-10-CM

## 2023-09-21 DIAGNOSIS — Z12.31 ENCOUNTER FOR SCREENING MAMMOGRAM FOR MALIGNANT NEOPLASM OF BREAST: ICD-10-CM

## 2023-09-21 DIAGNOSIS — Z78.0 POSTMENOPAUSAL: ICD-10-CM

## 2023-09-21 DIAGNOSIS — M85.80 OSTEOPENIA, UNSPECIFIED LOCATION: ICD-10-CM

## 2023-09-21 DIAGNOSIS — Z90.11 H/O RIGHT MASTECTOMY: ICD-10-CM

## 2023-09-21 DIAGNOSIS — C50.919 MALIGNANT NEOPLASM OF FEMALE BREAST, UNSPECIFIED ESTROGEN RECEPTOR STATUS, UNSPECIFIED LATERALITY, UNSPECIFIED SITE OF BREAST: Primary | ICD-10-CM

## 2023-09-21 PROCEDURE — G0463 HOSPITAL OUTPT CLINIC VISIT: HCPCS | Performed by: INTERNAL MEDICINE

## 2023-09-21 RX ORDER — AMLODIPINE BESYLATE 5 MG/1
1 TABLET ORAL DAILY
COMMUNITY
Start: 2023-08-28

## 2023-09-21 RX ORDER — CLINDAMYCIN HYDROCHLORIDE 300 MG/1
CAPSULE ORAL
COMMUNITY
Start: 2023-08-17

## 2023-09-21 NOTE — PROGRESS NOTES
Chief Complaint/Care Team   breast cancer    Azalea Abdullahi, NUZHAT*  Azalea Abdullahi, APRN    History of Present Illness     Diagnosis: Right Breast Cancer, stage I, diagnosed 5/20/2015    Current Treatment: Active surveillance  Previous Treatment: See treatment history below    Cristiane Bishop is a 83 y.o. female who presents to [facility] for follow up regarding right breast cancer.    Ms. Cristiane Bishop presents for 1 year follow up for right breast cancer, stage I. Completed right mastectomy and completed 5 years of endocrine therapy in June of 2021.     She comes in today for mammogram and bone density results. Reports she will be having dental work done on October 16. She took Prolia injections in the past but has not had any for at least 2 years. She completed bone density on 9/16/22. She also completed left sided mammogram, completed on 9/15/2023, was BI-RADS 1 negative.  She reports she feels well overall.       Review of Systems   Constitutional:  Negative for appetite change, diaphoresis, fatigue, fever, unexpected weight gain and unexpected weight loss.   HENT:  Negative for hearing loss, mouth sores, sore throat, swollen glands, trouble swallowing and voice change.    Eyes:  Negative for blurred vision.   Respiratory:  Negative for cough, shortness of breath and wheezing.    Cardiovascular:  Negative for chest pain and palpitations.   Gastrointestinal:  Negative for abdominal pain, blood in stool, constipation, diarrhea, nausea and vomiting.   Endocrine: Negative for cold intolerance and heat intolerance.   Genitourinary:  Negative for difficulty urinating, dysuria, frequency, hematuria and urinary incontinence.   Musculoskeletal:  Negative for arthralgias, back pain and myalgias.   Skin:  Negative for rash, skin lesions and wound.   Neurological:  Negative for dizziness, seizures, weakness, numbness and headache.   Hematological:  Does not  "bruise/bleed easily.   Psychiatric/Behavioral:  Negative for depressed mood. The patient is not nervous/anxious.    All other systems reviewed and are negative.     Oncology/Hematology History Overview Note   HEME/ONC DX/RX/INVESTIGATIONS  DX:   R breast ca, stage I, ER/ID positive, HER2 negative, grade I (low risk).    Anemia undergoing medical evaluation. As of 6/18/2021, patient is anticipating GI consultation in the near future.     Hyperglobulinemia, hyponatremia.     RX:   Femara, started in 6/2015. DC on 6/15/2021.  Prolia Q6m. On 6/18/20201, patient declined to continue Prolia.    INVESTIGATIONS:   5/28/2015, pathology report, right mastectomy, invasive ductal carcinoma, greatest dimension 7 mm, grade 1, 3 sentinel lymph nodes negative, ER/ID positive, HER-2 negative.    8/28/2020, L Mammogram, OTONIEL.    8/28/2020, DEXA, CONCLUSION: Normal bone density lumbar spine. Osteopenia in the hips.Bone density in the femoral necks has decreased by 1.9 percent compared to 2018.      Breast cancer in female   5/8/2015 Initial Diagnosis    Breast cancer in female (CMS/HCC)     6/18/2021 - 12/16/2021 Chemotherapy    OP SUPPORTIVE Denosumab (Prolia) Q6M       6/18/2021 Cancer Staged    Staging form: Breast, AJCC 8th Edition  - Clinical: Stage IA (cT1, cN0, cM0, G1, ER+, ID+, HER2-) - Signed by Tate Humphries MD on 6/18/2021     Breast cancer (Resolved)   6/17/2021 Initial Diagnosis    Breast cancer (CMS/HCC)     6/18/2021 Cancer Staged    Staging form: Breast, AJCC 8th Edition  - Clinical: Stage IA (cT1, cN0, cM0, G1, ER+, ID+, HER2-) - Signed by Tate Humphries MD on 6/18/2021         Objective     Vitals:    09/21/23 1033   BP: 124/56   Pulse: 95   Resp: 18   Temp: 97.8 °F (36.6 °C)   TempSrc: Temporal   SpO2: 99%   Weight: 54.7 kg (120 lb 9.5 oz)   Height: 147.3 cm (57.99\")   PainSc: 0-No pain     ECOG score: 0         PHQ-9 Total Score:         Physical Exam  Vitals reviewed. Exam conducted with a chaperone present. "   Constitutional:       General: She is not in acute distress.     Appearance: Normal appearance.   HENT:      Head: Normocephalic and atraumatic.   Eyes:      Extraocular Movements: Extraocular movements intact.      Conjunctiva/sclera: Conjunctivae normal.   Pulmonary:      Effort: Pulmonary effort is normal.   Musculoskeletal:      Cervical back: Normal range of motion and neck supple.   Skin:     General: Skin is warm and dry.      Findings: No bruising.   Neurological:      Mental Status: She is oriented to person, place, and time.         Past Medical History     Past Medical History:   Diagnosis Date    Allergic rhinitis     Breast cancer     RIGHT    GERD (gastroesophageal reflux disease)     HTN (hypertension)     Hypertension     Ingrown toenail     Mood disorder      Current Outpatient Medications on File Prior to Visit   Medication Sig Dispense Refill    amLODIPine (NORVASC) 5 MG tablet Take 1 tablet by mouth Daily.      aspirin 81 MG EC tablet Take 1 tablet by mouth Daily.      atorvastatin (LIPITOR) 20 MG tablet Take 1 tablet by mouth Daily. 90 tablet 1    Calcium Carb-Cholecalciferol (Calcium 600+D3) 600-200 MG-UNIT tablet Take 1 tablet by mouth Daily. Last dose 1 month ago      clindamycin (CLEOCIN) 300 MG capsule TAKE 1 CAPSULE BY MOUTH TWICE DAILY UNTIL GONE      lisinopril (PRINIVIL,ZESTRIL) 20 MG tablet TAKE 1 TABLET BY MOUTH TWICE DAILY (Patient taking differently: Daily. Pt takes one pill qd) 180 tablet 1    loratadine (CLARITIN) 10 MG tablet Take 1 tablet by mouth Every Night.      Menaquinone-7 (VITAMIN K2 PO) Take  by mouth.      pantoprazole (PROTONIX) 40 MG EC tablet TAKE 1 TABLET BY MOUTH DAILY 90 tablet 0    PARoxetine (PAXIL) 20 MG tablet Take 1 tablet by mouth Every Night. 90 tablet 1     No current facility-administered medications on file prior to visit.      Allergies   Allergen Reactions    Tetracycline Nausea Only and Rash     Difficulty swallowing      Penicillins Rash    Sulfa  Antibiotics Rash     Past Surgical History:   Procedure Laterality Date    ABDOMINAL HYSTERECTOMY      APPENDECTOMY      BREAST BIOPSY Right     COLONOSCOPY N/A 8/30/2021    Procedure: COLONOSCOPY;  Surgeon: Norman Damon MD;  Location: [facility];  Service: Gastroenterology;  Laterality: N/A;  NORMAL COLON    ENDOSCOPY N/A 8/30/2021    Procedure: ESOPHAGOGASTRODUODENOSCOPY WITH BIOPSY;  Surgeon: Norman Damon MD;  Location: [facility] ENDOSCOPY;  Service: Gastroenterology;  Laterality: N/A;  GASTRITIS/HIATAL HERNIA    ENDOSCOPY N/A 9/27/2021    Procedure: ESOPHAGOGASTRODUODENOSCOPY with biopsies;  Surgeon: Norman Damon MD;  Location: [facility] ENDOS[facility];  Service: Gastroenterology;  Laterality: N/A;  GASTRITIS, HIATAL HERNIA    ENDOSCOPY N/A 1/17/2022    Procedure: ESOPHAGOGASTRODUODENOSCOPY WITH BX;  Surgeon: Norman Damon MD;  Location: [facility] END[facility];  Service: Gastroenterology;  Laterality: N/A;  HIATAL HERNIA, REFULX ESOPHAGITIS    HYSTERECTOMY      MASTECTOMY      MASTECTOMY Right 05/2015     Social History     Socioeconomic History    Marital status:    Tobacco Use    Smoking status: Never    Smokeless tobacco: Never   Vaping Use    Vaping Use: Never used   Substance and Sexual Activity    Alcohol use: Never    Drug use: Never    Sexual activity: Defer     Family History   Problem Relation Age of Onset    Lymphoma Mother     Breast cancer Mother     Cancer Mother     Lymphoma Maternal Uncle     Diabetes Maternal Uncle     Heart disease Other     Diabetes Paternal Uncle     Lymphoma Other     Stroke Other     Colon cancer Neg Hx     Malig Hyperthermia Neg Hx        Results     Result Review   The following data was reviewed by: Rafita Pemberton MD on 09/21/2023:  Lab Results   Component Value Date    HGB 12.0 05/18/2022    HCT 36.9 05/18/2022    MCV 96.1 05/18/2022     05/18/2022    WBC 7.02 05/18/2022    NEUTROABS 4.02 05/18/2022    LYMPHSABS 2.02 05/18/2022     MONOSABS 0.72 05/18/2022    EOSABS 0.21 05/18/2022    BASOSABS 0.03 05/18/2022     Lab Results   Component Value Date    GLUCOSE 143 (H) 04/12/2023    BUN 17 04/12/2023    CREATININE 0.99 04/12/2023     (L) 04/12/2023    K 4.2 04/12/2023    CL 97 (L) 04/12/2023    CO2 27.2 04/12/2023    CALCIUM 9.5 04/12/2023    PROTEINTOT 7.2 03/27/2023    ALBUMIN 4.1 03/27/2023    BILITOT 0.7 03/27/2023    ALKPHOS 107 03/27/2023    AST 24 03/27/2023    ALT 10 03/27/2023     Lab Results   Component Value Date    MG 2.1 06/18/2021    PHOS 2.9 06/18/2021    FREET4 1.01 05/18/2022    TSH 3.820 05/18/2022           No radiology results for the last day  Mammo Screening Modified With Tomosynthesis Left With CAD    Result Date: 9/15/2023  Impression:  Benign mammogram. Suggest routine mammographic screening.  RECOMMENDATION(S):  ROUTINE MAMMOGRAM AND CLINICAL EVALUATION IN 12 MONTHS.   BIRADS:  DIAGNOSTIC CATEGORY 1--NEGATIVE.   BREAST COMPOSITION: Scattered areas fibroglandular density.  PLEASE NOTE:  A NORMAL MAMMOGRAM DOES NOT EXCLUDE THE POSSIBILITY OF BREAST CANCER. ANY CLINICALLY SUSPICIOUS PALPABLE LUMP SHOULD BE BIOPSIED.      Cheryl Motley M.D.       Electronically Signed and Approved By: Cheryl Motley M.D. on 9/15/2023 at 12:49              Assessment & Plan     Diagnoses and all orders for this visit:    1. Malignant neoplasm of female breast, unspecified estrogen receptor status, unspecified laterality, unspecified site of breast (Primary)  -     Mammo screening digital tomosynthesis bilateral w CAD; Future  -     DEXA Bone Density Axial; Future  -     CBC & Differential; Future  -     Comprehensive Metabolic Panel; Future  -     Vitamin D,25-Hydroxy; Future    2. Encounter for screening mammogram for malignant neoplasm of breast  -     Mammo screening digital tomosynthesis bilateral w CAD; Future  -     DEXA Bone Density Axial; Future  -     CBC & Differential; Future  -     Comprehensive Metabolic Panel;  Future  -     Vitamin D,25-Hydroxy; Future    3. H/O right mastectomy  -     Mammo screening digital tomosynthesis bilateral w CAD; Future  -     DEXA Bone Density Axial; Future  -     CBC & Differential; Future  -     Comprehensive Metabolic Panel; Future  -     Vitamin D,25-Hydroxy; Future    4. Osteopenia, unspecified location  -     Mammo screening digital tomosynthesis bilateral w CAD; Future  -     DEXA Bone Density Axial; Future  -     CBC & Differential; Future  -     Comprehensive Metabolic Panel; Future  -     Vitamin D,25-Hydroxy; Future    5. Postmenopausal  -     Mammo screening digital tomosynthesis bilateral w CAD; Future  -     DEXA Bone Density Axial; Future  -     CBC & Differential; Future  -     Comprehensive Metabolic Panel; Future  -     Vitamin D,25-Hydroxy; Future    6. Anemia, unspecified type  -     Mammo screening digital tomosynthesis bilateral w CAD; Future  -     DEXA Bone Density Axial; Future  -     CBC & Differential; Future  -     Comprehensive Metabolic Panel; Future  -     Vitamin D,25-Hydroxy; Future        Cristiane Bishop is a 83 y.o. female who presents to [facility]EMATOLOGY [facility] ONCOLOGY for Right Breast Cancer, stage I, diagnosed 5/20/2015, status post right mastectomy,completed 5 years of endocrine therapy in June of 2021.  Patient underwent mammogram of left breast on 9/15/2023 which was BI-RADS 1 negative.    -Most recent DEXA scan from 9/13/2022-was normal, but did reveal osteopenia in the hips, recommend repeating DEXA scan in 9/20/2024    -Recommend patient continue with annual mammogram, mammogram from 9/15/2023 was BI-RADS 1 negative.    Plan patient follow-up in 1 year with CBC, CMP, vitamin D, DEXA scan, and mammogram before follow-up with me.    Please note that portions of this note were completed with a voice recognition program.    Electronically signed by Rafita Pemberton MD, 09/21/23, 5:14 PM EDT.        Follow Up     I spent 45 minutes  caring for Cristiane on this date of service. This time includes time spent by me in the following activities:preparing for the visit, reviewing tests, obtaining and/or reviewing a separately obtained history, performing a medically appropriate examination and/or evaluation , counseling and educating the patient/family/caregiver, ordering medications, tests, or procedures, referring and communicating with other health care professionals , documenting information in the medical record, independently interpreting results and communicating that information with the patient/family/caregiver, and care coordination.    This is an acute or chronic illness that poses a threat to life or bodily function. The above treatment plan involves a high risk of complications and/or mortality of patient management.    The patient was seen and examined. Work by the provider also included review and/or ordering of lab tests, review and/or ordering of radiology tests, review and/or ordering of medicine tests, discussion with other physicians or providers, independent review of data, obtaining old records, review/summation of old records, and/or other review.    I have reviewed the family history, social history, and past medical history for this patient. Previous information and data has been reviewed and updated as needed. I have reviewed and verified the chief complaint, history, and other documentation. The patient was interviewed and examined in the clinic and the chart reviewed. The previous observations, recommendations, and conclusions were reviewed including those of other providers.     The plan was discussed with the patient and/or family. The patient was given time to ask questions and these questions were answered. At the conclusion of their visit they had no additional questions or concerns and all questions were answered to their satisfaction.    Patient was given instructions and counseling regarding her condition or for  health maintenance advice. Please see specific information pulled into the AVS if appropriate.

## 2023-10-24 DIAGNOSIS — I10 WHITE COAT SYNDROME WITH DIAGNOSIS OF HYPERTENSION: ICD-10-CM

## 2023-10-24 RX ORDER — LISINOPRIL 20 MG/1
20 TABLET ORAL DAILY
Qty: 90 TABLET | Refills: 0 | Status: SHIPPED | OUTPATIENT
Start: 2023-10-24

## 2023-11-07 ENCOUNTER — OFFICE VISIT (OUTPATIENT)
Dept: FAMILY MEDICINE CLINIC | Facility: CLINIC | Age: 83
End: 2023-11-07
Payer: MEDICARE

## 2023-11-07 VITALS
HEART RATE: 81 BPM | TEMPERATURE: 97.1 F | HEIGHT: 60 IN | BODY MASS INDEX: 24.35 KG/M2 | OXYGEN SATURATION: 98 % | DIASTOLIC BLOOD PRESSURE: 74 MMHG | SYSTOLIC BLOOD PRESSURE: 124 MMHG | WEIGHT: 124 LBS

## 2023-11-07 DIAGNOSIS — K21.9 GASTROESOPHAGEAL REFLUX DISEASE, UNSPECIFIED WHETHER ESOPHAGITIS PRESENT: ICD-10-CM

## 2023-11-07 DIAGNOSIS — F41.9 ANXIETY AND DEPRESSION: ICD-10-CM

## 2023-11-07 DIAGNOSIS — I10 WHITE COAT SYNDROME WITH DIAGNOSIS OF HYPERTENSION: Primary | ICD-10-CM

## 2023-11-07 DIAGNOSIS — E78.5 DYSLIPIDEMIA: ICD-10-CM

## 2023-11-07 DIAGNOSIS — F32.A ANXIETY AND DEPRESSION: ICD-10-CM

## 2023-11-07 PROCEDURE — 3078F DIAST BP <80 MM HG: CPT | Performed by: NURSE PRACTITIONER

## 2023-11-07 PROCEDURE — 99214 OFFICE O/P EST MOD 30 MIN: CPT | Performed by: NURSE PRACTITIONER

## 2023-11-07 PROCEDURE — 3074F SYST BP LT 130 MM HG: CPT | Performed by: NURSE PRACTITIONER

## 2023-11-07 RX ORDER — ATORVASTATIN CALCIUM 20 MG/1
20 TABLET, FILM COATED ORAL DAILY
Qty: 90 TABLET | Refills: 1 | Status: SHIPPED | OUTPATIENT
Start: 2023-11-07

## 2023-11-07 RX ORDER — OMEPRAZOLE 40 MG/1
40 CAPSULE, DELAYED RELEASE ORAL DAILY
Qty: 90 CAPSULE | Refills: 1 | Status: SHIPPED | OUTPATIENT
Start: 2023-11-07

## 2023-11-07 RX ORDER — PAROXETINE HYDROCHLORIDE 20 MG/1
20 TABLET, FILM COATED ORAL NIGHTLY
Qty: 90 TABLET | Refills: 1 | Status: SHIPPED | OUTPATIENT
Start: 2023-11-07

## 2023-11-07 RX ORDER — LETROZOLE 2.5 MG/1
TABLET, FILM COATED ORAL EVERY 24 HOURS
COMMUNITY
End: 2023-11-07

## 2023-11-07 RX ORDER — OMEPRAZOLE 40 MG/1
40 CAPSULE, DELAYED RELEASE ORAL DAILY
COMMUNITY
End: 2023-11-07 | Stop reason: SDUPTHER

## 2023-11-07 RX ORDER — LISINOPRIL 20 MG/1
20 TABLET ORAL DAILY
Qty: 90 TABLET | Refills: 1 | Status: SHIPPED | OUTPATIENT
Start: 2023-11-07

## 2023-11-07 NOTE — PROGRESS NOTES
Chief Complaint  Med Refill (Pt here for medication refills.)    History of Present Illness  Cristiane Bishop is a 83 y.o. female who presents to [facility] with a past medical history of    Past Medical History:   Diagnosis Date    Allergic rhinitis     Breast cancer     RIGHT    GERD (gastroesophageal reflux disease)     HTN (hypertension)     Hypertension     Ingrown toenail     Mood disorder      And presents to the office today to follow-up on chronic health conditions and for medication refills.  She was last seen in the office in April 2023 for her chronic health conditions.    She is currently prescribed Paxil for treatment of depression and anxiety.  She is taking 20 mg nightly.  She describes her symptoms as stable.  She denies any homicidal ideations or suicidal ideations.  She denies any AVH.    She is currently taking lisinopril for hypertension, with history of whitecoat syndrome.  Previously she had been on Multi-Med therapy, but her blood pressure had been dropping low at home so her medications were decreased.  She is currently only taking 20 mg of lisinopril daily.  She stopped taking amlodipine 5 mg daily.  Her blood pressure has been running in normal range at home, and it is normotensive on exam today. She denies any chest pain, palpitations, headaches, dizziness, or shortness of breath.  She denies any lower extremity edema.    She continues to take atorvastatin for dyslipidemia.  No complaints of myalgia with use.    She sees Dr. Damon/Chelle for her GERD.  Her EGD was in January.  At that time he put her on Protonix 40 mg daily.  She actually feels like omeprazole 40 mg daily worked better for her than the pantoprazole, so she would like a new prescription for the omeprazole sent to the pharmacy.  She has been taking what she had left at home of it after stopping pantoprazole.  Denies dysphagia, nausea, vomiting, abdominal pain, or melena.    She is  "followed by hematology/oncology for history of breast cancer with right mastectomy, as well as osteopenia.  They have ordered her mammogram and her DEXA, both of which are scheduled for September 2024.    Objective   Vital Signs:   Vitals:    11/07/23 1050   BP: 124/74   Pulse: 81   Temp: 97.1 °F (36.2 °C)   TempSrc: Temporal   SpO2: 98%   Weight: 56.2 kg (124 lb)   Height: 152.4 cm (60\")       Wt Readings from Last 3 Encounters:   11/07/23 56.2 kg (124 lb)   09/21/23 54.7 kg (120 lb 9.5 oz)   06/08/23 56.7 kg (125 lb)     BP Readings from Last 3 Encounters:   11/07/23 124/74   09/21/23 124/56   06/08/23 120/78       Health Maintenance   Topic Date Due    ANNUAL WELLNESS VISIT  04/26/2024    BMI FOLLOWUP  04/26/2024    DXA SCAN  09/13/2024    MAMMOGRAM  09/15/2024    TDAP/TD VACCINES (3 - Td or Tdap) 09/15/2024    COVID-19 Vaccine  Completed    INFLUENZA VACCINE  Completed    Pneumococcal Vaccine 65+  Completed    ZOSTER VACCINE  Completed       Physical Exam  Vitals reviewed.   Constitutional:       General: She is not in acute distress.     Appearance: Normal appearance. She is well-developed and normal weight.   HENT:      Head: Normocephalic and atraumatic.   Eyes:      General: No scleral icterus.        Right eye: No discharge.         Left eye: No discharge.      Extraocular Movements: Extraocular movements intact.      Conjunctiva/sclera: Conjunctivae normal.   Neck:      Thyroid: No thyroid mass, thyromegaly or thyroid tenderness.      Vascular: No carotid bruit.      Trachea: Trachea normal.   Cardiovascular:      Rate and Rhythm: Normal rate and regular rhythm.      Pulses: Normal pulses.      Heart sounds: No murmur heard.  Pulmonary:      Effort: Pulmonary effort is normal. No respiratory distress.      Breath sounds: Normal breath sounds. No wheezing, rhonchi or rales.   Musculoskeletal:         General: Normal range of motion.      Cervical back: Normal range of motion and neck supple. No tenderness. "      Right lower leg: No edema.      Left lower leg: No edema.   Lymphadenopathy:      Cervical: No cervical adenopathy.   Skin:     General: Skin is warm and dry.   Neurological:      Mental Status: She is alert and oriented to person, place, and time.   Psychiatric:         Mood and Affect: Mood and affect normal.         Behavior: Behavior normal.         Thought Content: Thought content normal.         Judgment: Judgment normal.            Result Review :  The following data was reviewed by: ORLIN Denson on 11/07/2023:    No visits with results within 1 Month(s) from this visit.   Latest known visit with results is:   Office Visit on 04/12/2023   Component Date Value    Folate 04/12/2023 18.40     Vitamin B-12 04/12/2023 478     Vitamin B1, Whole Blood 04/12/2023 180.6     OSMANY Direct 04/12/2023 Positive (A)     Anti-DNA (DS) Ab Qn 04/12/2023 1     RNP Antibodies 04/12/2023 0.2     Joens Antibodies 04/12/2023 <0.2     Antiscleroderma-70 Antib* 04/12/2023 <0.2     Sjogren's Anti-SS-A 04/12/2023 >8.0 (H)     Sjogren's Anti-SS-B 04/12/2023 <0.2     Antichromatin Antibodies 04/12/2023 3.1 (H)     NOELLE-1 IgG 04/12/2023 <0.2     Anti-Centromere B Antibo* 04/12/2023 <0.2     See below: 04/12/2023 Comment     Color, UA 04/12/2023 Yellow     Appearance, UA 04/12/2023 Clear     pH, UA 04/12/2023 6.5     Specific Gravity, UA 04/12/2023 1.014     Glucose, UA 04/12/2023 Negative     Ketones, UA 04/12/2023 Negative     Bilirubin, UA 04/12/2023 Negative     Blood, UA 04/12/2023 Negative     Protein, UA 04/12/2023 Negative     Leuk Esterase, UA 04/12/2023 Negative     Nitrite, UA 04/12/2023 Negative     Urobilinogen, UA 04/12/2023 0.2 E.U./dL     Glucose 04/12/2023 143 (H)     BUN 04/12/2023 17     Creatinine 04/12/2023 0.99     Sodium 04/12/2023 134 (L)     Potassium 04/12/2023 4.2     Chloride 04/12/2023 97 (L)     CO2 04/12/2023 27.2     Calcium 04/12/2023 9.5     BUN/Creatinine Ratio 04/12/2023 17.2     Anion Gap  04/12/2023 9.8     eGFR 04/12/2023 57.8 (L)     Zinc, Whole Blood 04/12/2023 613      Mammo Screening Modified With Tomosynthesis Left With CAD (09/15/2023 12:24)   DEXA Bone Density Axial (09/13/2022 12:08)     UPPER GI ENDOSCOPY (01/17/2022 08:32)     Procedures        Assessment and Plan   Diagnoses and all orders for this visit:    1. White coat syndrome with diagnosis of hypertension (Primary)  -     CBC Auto Differential; Future  -     Comprehensive Metabolic Panel; Future  -     Lipid Panel; Future  -     TSH+Free T4; Future  -     Microalbumin / Creatinine Urine Ratio - Urine, Clean Catch; Future  -     lisinopril (PRINIVIL,ZESTRIL) 20 MG tablet; Take 1 tablet by mouth Daily. Pt takes one pill qd  Dispense: 90 tablet; Refill: 1    2. Dyslipidemia  -     Comprehensive Metabolic Panel; Future  -     Lipid Panel; Future  -     atorvastatin (LIPITOR) 20 MG tablet; Take 1 tablet by mouth Daily.  Dispense: 90 tablet; Refill: 1    3. Gastroesophageal reflux disease, unspecified whether esophagitis present  -     omeprazole (priLOSEC) 40 MG capsule; Take 1 capsule by mouth Daily. Indications: Gastroesophageal Reflux Disease  Dispense: 90 capsule; Refill: 1    4. Anxiety and depression  -     PARoxetine (PAXIL) 20 MG tablet; Take 1 tablet by mouth Every Night.  Dispense: 90 tablet; Refill: 1                  FOLLOW UP  Return in about 6 months (around 4/29/2024) for Medicare Wellness, medication refills and fasting labs.    Patient was given instructions and counseling regarding her condition or for health maintenance advice. Please see specific information pulled into the AVS if appropriate.       Azalea Abdullahi, APRN  11/07/23  11:07 EST    CURRENT & DISCONTINUED MEDICATIONS  Current Outpatient Medications   Medication Instructions    aspirin 81 mg, Oral, Daily    atorvastatin (LIPITOR) 20 mg, Oral, Daily    Calcium Carb-Cholecalciferol (Calcium 600+D3) 600-200 MG-UNIT tablet 1 tablet, Oral, Daily, Last dose 1  month ago    lisinopril (PRINIVIL,ZESTRIL) 20 mg, Oral, Daily, Pt takes one pill qd    loratadine (CLARITIN) 10 mg, Oral, Nightly    Menaquinone-7 (VITAMIN K2 PO) Oral    omeprazole (PRILOSEC) 40 mg, Oral, Daily    PARoxetine (PAXIL) 20 mg, Oral, Nightly       Medications Discontinued During This Encounter   Medication Reason    pantoprazole (PROTONIX) 40 MG EC tablet *Therapy completed    amLODIPine (NORVASC) 5 MG tablet *Therapy completed    clindamycin (CLEOCIN) 300 MG capsule *Therapy completed    letrozole (FEMARA) 2.5 MG tablet *Therapy completed    PARoxetine (PAXIL) 20 MG tablet Reorder    atorvastatin (LIPITOR) 20 MG tablet Reorder    lisinopril (PRINIVIL,ZESTRIL) 20 MG tablet Reorder    omeprazole (priLOSEC) 40 MG capsule Reorder

## 2023-11-10 DIAGNOSIS — F41.9 ANXIETY AND DEPRESSION: ICD-10-CM

## 2023-11-10 DIAGNOSIS — F32.A ANXIETY AND DEPRESSION: ICD-10-CM

## 2023-11-10 DIAGNOSIS — K21.9 GASTROESOPHAGEAL REFLUX DISEASE, UNSPECIFIED WHETHER ESOPHAGITIS PRESENT: ICD-10-CM

## 2023-11-10 DIAGNOSIS — I10 WHITE COAT SYNDROME WITH DIAGNOSIS OF HYPERTENSION: ICD-10-CM

## 2023-11-10 RX ORDER — AMLODIPINE BESYLATE 5 MG/1
5 TABLET ORAL DAILY
Qty: 90 TABLET | Refills: 1 | OUTPATIENT
Start: 2023-11-10

## 2023-11-10 RX ORDER — PANTOPRAZOLE SODIUM 40 MG/1
40 TABLET, DELAYED RELEASE ORAL DAILY
Qty: 90 TABLET | Refills: 0 | OUTPATIENT
Start: 2023-11-10

## 2023-11-10 RX ORDER — PAROXETINE HYDROCHLORIDE 20 MG/1
20 TABLET, FILM COATED ORAL NIGHTLY
Qty: 90 TABLET | Refills: 1 | OUTPATIENT
Start: 2023-11-10

## 2023-11-13 ENCOUNTER — HOSPITAL ENCOUNTER (OUTPATIENT)
Dept: OCCUPATIONAL THERAPY | Facility: HOSPITAL | Age: 83
Setting detail: THERAPIES SERIES
Discharge: HOME | End: 2023-11-13
Payer: MEDICARE

## 2023-11-13 DIAGNOSIS — C50.411 MALIGNANT NEOPLASM OF UPPER-OUTER QUADRANT OF RIGHT BREAST IN FEMALE, ESTROGEN RECEPTOR POSITIVE: ICD-10-CM

## 2023-11-13 DIAGNOSIS — N64.89 DEFORMITY DUE TO MASTECTOMY: ICD-10-CM

## 2023-11-13 DIAGNOSIS — Z17.0 MALIGNANT NEOPLASM OF UPPER-OUTER QUADRANT OF RIGHT BREAST IN FEMALE, ESTROGEN RECEPTOR POSITIVE: ICD-10-CM

## 2023-11-13 DIAGNOSIS — Z90.11 HISTORY OF MASTECTOMY, RIGHT: Primary | ICD-10-CM

## 2023-11-13 DIAGNOSIS — Z90.10 DEFORMITY DUE TO MASTECTOMY: ICD-10-CM

## 2023-11-13 DIAGNOSIS — Z44.9 FITTING AND ADJUSTMENT OF UNSPECIFIED PROSTHETIC DEVICE: ICD-10-CM

## 2023-11-13 PROCEDURE — 97535 SELF CARE MNGMENT TRAINING: CPT | Performed by: OCCUPATIONAL THERAPIST

## 2023-11-13 PROCEDURE — L8000 MASTECTOMY BRA: HCPCS | Performed by: OCCUPATIONAL THERAPIST

## 2023-11-13 NOTE — THERAPY RE-EVALUATION
Outpatient Occupational Therapy Lymphedema Re-Evaluation   Maryanne     Patient Name: Cristiane Bishop  : 1940  MRN: [id number]  Today's Date: 2023      Visit Date: 2023    Patient Active Problem List   Diagnosis    Breast cancer in female    Heel pain    Allergic rhinitis    Esophageal reflux    Heel spur    History of breast cancer    Essential hypertension    HTN (hypertension)    Ingrown toenail    Depression    Mood disorder    Osteopenia    Bacterial infection due to H. pylori    Iron deficiency anemia due to chronic blood loss    Heartburn    Abnormal CT scan, stomach        Past Medical History:   Diagnosis Date    Allergic rhinitis     Breast cancer     RIGHT    GERD (gastroesophageal reflux disease)     HTN (hypertension)     Hypertension     Ingrown toenail     Mood disorder         Past Surgical History:   Procedure Laterality Date    ABDOMINAL HYSTERECTOMY      APPENDECTOMY      BREAST BIOPSY Right     COLONOSCOPY N/A 2021    Procedure: COLONOSCOPY;  Surgeon: Norman Damon MD;  Location: [facility] ENDOSCOPY;  Service: Gastroenterology;  Laterality: N/A;  NORMAL COLON    ENDOSCOPY N/A 2021    Procedure: ESOPHAGOGASTRODUODENOSCOPY WITH BIOPSY;  Surgeon: Norman Damon MD;  Location: [facility] ENDOSCOPY;  Service: Gastroenterology;  Laterality: N/A;  GASTRITIS/HIATAL HERNIA    ENDOSCOPY N/A 2021    Procedure: ESOPHAGOGASTRODUODENOSCOPY with biopsies;  Surgeon: Norman Damon MD;  Location: [facility] ENDOSCOPY;  Service: Gastroenterology;  Laterality: N/A;  GASTRITIS, HIATAL HERNIA    ENDOSCOPY N/A 2022    Procedure: ESOPHAGOGASTRODUODENOSCOPY WITH BX;  Surgeon: Norman Damon MD;  Location: [facility] ENDOSCOPY;  Service: Gastroenterology;  Laterality: N/A;  HIATAL HERNIA, REFULX ESOPHAGITIS    HYSTERECTOMY      MASTECTOMY      MASTECTOMY Right 2015         Visit Dx:     ICD-10-CM ICD-9-CM   1. History of mastectomy, right  Z90.11 V45.71   2.  Deformity due to mastectomy  N64.89 611.89    Z90.10    3. Fitting and adjustment of unspecified prosthetic device  Z44.9 V52.9   4. Malignant neoplasm of upper-outer quadrant of right breast in female, estrogen receptor positive  C50.411 174.4    Z17.0 V86.0        Patient History       Row Name 11/13/23 1300             History    Chief Complaint --  She presents today to be fitted for right breast prosthesis and postmastectomy bras  -TD      Brief Description of Current Complaint Cristiane is an 82-year-old female with a recent history of right mastectomy in May 2015.  She presents this date for right prosthetic breast with postmastectomy bras.  -TD         Fall Risk Assessment    Any falls in the past year: No  -TD      Does patient have a fear of falling No  -TD         Services    Are you currently receiving Home Health services No  -TD      Do you plan to receive Home Health services in the near future No  -TD         Daily Activities    Primary Language English  -TD      Are you able to read Yes  -TD      Are you able to write Yes  -TD      How does patient learn best? Listening;Reading;Demonstration  -TD         Safety    Are you being hurt, hit, or frightened by anyone at home or in your life? No  -TD      Are you being neglected by a caregiver No  -TD      Have you had any of the following issues with N/A  -TD                User Key  (r) = Recorded By, (t) = Taken By, (c) = Cosigned By      Initials Name Provider Type    TD Frieda Michaud OT Occupational Therapist                         OT Ortho       Row Name 11/13/23 1300             Orthotics & Prosthetics Management    Orthosis Location other (see comments)  Breast prosthesis and postmastectomy orthosis  -TD      Additional Documentation Orthosis Location (Row)  -TD                User Key  (r) = Recorded By, (t) = Taken By, (c) = Cosigned By      Initials Name Provider Type    Frieda Laguna OT Occupational Therapist                     OT  Mastectomy Care:   Location:        Right chest wall     Type of Prosthetic: silicone breast form     Reason for Visit/Customer Goal:  Patient would like to achieve symmetry and balance in appearance to improve orthopedic balance as well as improve psychological impact when engaging in community and social activities.     Reason for Prosthetic: Prevent Deformities     Date of Surgery: May 2015     Date of Discharge: May 2015     Wearing Schedule:   As Tolerated     Prosthetic Site Condition:   Intact     Tolerance:      Tolerates Well     Product :  Ham     Product Name and Model Number: 400 Natura Xtra 2n size: 7     Garments  Type of Garment Dispensed:          Mast Bra w/o Breast Form     Breast : Ham     Product Name: 4- 32833 Lynnette satin 40B Nude            Number of Garments Dispensed:   4    Therapy Education  Education Details: Fit and size are appropriate with no gapping, pinching, or puckering observed. Pt. states comfort and satisfaction with both bra's selected and with prosthesis. All devices were checked for quality and safety. Pt. was educated on the benefits, precautions warranty, care and maintenance for her prosthesis and bras. Educational handout was provided in the prosthesis box.  Given: HEP  Program: New  How Provided: Verbal, Demonstration, Written  Provided to: Patient  Level of Understanding: Teach back education performed, Verbalized, Demonstrated  81339 - OT Self Care/Mgmt Minutes: 25         OT Goals       Row Name 11/13/23 1302          Time Calculation    OT Goal Re-Cert Due Date 12/13/23  -TD               User Key  (r) = Recorded By, (t) = Taken By, (c) = Cosigned By      Initials Name Provider Type    Frieda Laguna OT Occupational Therapist                  Goals:       1. Encounter for Breast Prosthetic and mastectomy bra fitting:  LTG 1:  90 days: To provide balance and symmetry for performance of daily activity, the patient will participate  in breast prosthesis and mastectomy bra fitting procedure.              STATUS: on going  STG 1a: 30 days:  Patient will participate in mastectomy bra fit re-evaluation to ensure proper fit for balance and symmetry for improved postural alignment/lymph fluid dynamics to prevent impairment in activities of daily living.              STATUS: on going  STG 1b: 30 days:  Patient will participate in breast prosthesis fit re-evaluation 2 years after initial distribution to ensure proper fit for balance and symmetry for improved postural alignment/lymph fluid dynamics to prevent impairment in activities of daily living.  STATUS: on going  TREATMENT: Orthotic/Prosthetic Management and Training, [place]  Silicone Breast Prosthetic, Mastectomy Bra initial fitting and   3 month re-fitting, ADL/Self Care, Therapeutic Activity, Therapeutic Exercise, and Manual Therapy.     OT Assessment/Plan       Row Name 11/13/23 130          OT Assessment    Functional Limitations Performance in self-care ADL  -TD     Assessment Comments Pt presents with decreased balance and symmetry from breast resection that impacts orthopedic balance and symmetry. Patient will benefit from continued evaluation and of integrity of the silicone breast form as well as the mastectomy bras to ensure proper fit for symmetry and balance of breast prosthesis to reduce orthopedic and lymphatic impairment. The skills of a therapist will be required to safely and effectively implement the following treatment plan to restore maximal level of function.  -TD     OT Diagnosis History of right mastectomy  -TD     OT Rehab Potential Good  -TD     Patient/caregiver participated in establishment of treatment plan and goals Yes  -TD     Patient would benefit from skilled therapy intervention Yes  -TD        OT Plan    OT Frequency --  see duration  -TD     Predicted Duration of Therapy Intervention (OT) History of right mastectomy  -TD     Planned CPT's? OT EVAL LOW  COMPLEXITY: 03586;OT RE-EVAL: 51165;OT THER ACT EA 15 MIN: 87911GS;OT THER PROC EA 15 MIN: 02226MD;OT SELF CARE/MGMT/TRAIN 15 MIN: 27201;OT MANUAL THERAPY EA 15 MIN: 08233;OT BIS XTRACELL FLUID ANALYSIS: 14106;OT ORTHOTIC MGMT/TRAIN EA 15 MIN: 93083;OT ORTHO/PROSTHET CHECKOUT EA 15 MIN: 79570;OT CARE PLAN EA 15 MIN  -TD     Planned Therapy Interventions (Optional Details) home exercise program;manual therapy techniques;strengthening;ROM (Range of Motion);prosthetic fitting/training;patient/family education;orthotic fitting/training;stretching  -TD     OT Plan Comments continue POC  -TD               User Key  (r) = Recorded By, (t) = Taken By, (c) = Cosigned By      Initials Name Provider Type    TD Frieda Michaud OT Occupational Therapist                              Time Calculation:   Timed Charges  95188 - OT Self Care/Mgmt Minutes: 25  Total Minutes  Timed Charges Total Minutes: 25   Total Minutes: 25     Therapy Charges for Today       Code Description Service Date Service Provider Modifiers Qty    18257561704 HC OT SELF CARE/MGMT/TRAIN EA 15 MIN 11/13/2023 Frieda Michaud OT GO 2    97048942836 HC BRA POST/SURG NO/FORM IVAN/CONNOR/DANIEL/POWER/JORGE 11/13/2023 Frieda Michaud OT  4                      Frieda Michaud OT  11/13/2023

## 2023-11-16 ENCOUNTER — CLINICAL SUPPORT (OUTPATIENT)
Dept: FAMILY MEDICINE CLINIC | Facility: CLINIC | Age: 83
End: 2023-11-16
Payer: MEDICARE

## 2023-11-16 DIAGNOSIS — C50.919 MALIGNANT NEOPLASM OF FEMALE BREAST, UNSPECIFIED ESTROGEN RECEPTOR STATUS, UNSPECIFIED LATERALITY, UNSPECIFIED SITE OF BREAST: ICD-10-CM

## 2023-11-16 DIAGNOSIS — Z90.11 H/O RIGHT MASTECTOMY: ICD-10-CM

## 2023-11-16 DIAGNOSIS — Z12.31 ENCOUNTER FOR SCREENING MAMMOGRAM FOR MALIGNANT NEOPLASM OF BREAST: ICD-10-CM

## 2023-11-16 DIAGNOSIS — E78.5 DYSLIPIDEMIA: ICD-10-CM

## 2023-11-16 DIAGNOSIS — M85.80 OSTEOPENIA, UNSPECIFIED LOCATION: ICD-10-CM

## 2023-11-16 DIAGNOSIS — I10 WHITE COAT SYNDROME WITH DIAGNOSIS OF HYPERTENSION: ICD-10-CM

## 2023-11-16 DIAGNOSIS — Z78.0 POSTMENOPAUSAL: ICD-10-CM

## 2023-11-16 DIAGNOSIS — D64.9 ANEMIA, UNSPECIFIED TYPE: ICD-10-CM

## 2023-11-16 LAB
25(OH)D3 SERPL-MCNC: 29.4 NG/ML (ref 30–100)
ALBUMIN SERPL-MCNC: 4.1 G/DL (ref 3.5–5.2)
ALBUMIN UR-MCNC: 4.6 MG/DL
ALBUMIN/GLOB SERPL: 1.2 G/DL
ALP SERPL-CCNC: 110 U/L (ref 39–117)
ALT SERPL W P-5'-P-CCNC: 14 U/L (ref 1–33)
ANION GAP SERPL CALCULATED.3IONS-SCNC: 6.5 MMOL/L (ref 5–15)
AST SERPL-CCNC: 24 U/L (ref 1–32)
BASOPHILS # BLD AUTO: 0.03 10*3/MM3 (ref 0–0.2)
BASOPHILS NFR BLD AUTO: 0.5 % (ref 0–1.5)
BILIRUB SERPL-MCNC: 0.8 MG/DL (ref 0–1.2)
BUN SERPL-MCNC: 17 MG/DL (ref 8–23)
BUN/CREAT SERPL: 19.3 (ref 7–25)
CALCIUM SPEC-SCNC: 9.4 MG/DL (ref 8.6–10.5)
CHLORIDE SERPL-SCNC: 98 MMOL/L (ref 98–107)
CHOLEST SERPL-MCNC: 112 MG/DL (ref 0–200)
CO2 SERPL-SCNC: 30.5 MMOL/L (ref 22–29)
CREAT SERPL-MCNC: 0.88 MG/DL (ref 0.57–1)
CREAT UR-MCNC: 85.5 MG/DL
DEPRECATED RDW RBC AUTO: 48.6 FL (ref 37–54)
EGFRCR SERPLBLD CKD-EPI 2021: 66.1 ML/MIN/1.73
EOSINOPHIL # BLD AUTO: 0.36 10*3/MM3 (ref 0–0.4)
EOSINOPHIL NFR BLD AUTO: 5.6 % (ref 0.3–6.2)
ERYTHROCYTE [DISTWIDTH] IN BLOOD BY AUTOMATED COUNT: 13.4 % (ref 12.3–15.4)
GLOBULIN UR ELPH-MCNC: 3.4 GM/DL
GLUCOSE SERPL-MCNC: 104 MG/DL (ref 65–99)
HCT VFR BLD AUTO: 37.6 % (ref 34–46.6)
HDLC SERPL-MCNC: 47 MG/DL (ref 40–60)
HGB BLD-MCNC: 12.1 G/DL (ref 12–15.9)
IMM GRANULOCYTES # BLD AUTO: 0.01 10*3/MM3 (ref 0–0.05)
IMM GRANULOCYTES NFR BLD AUTO: 0.2 % (ref 0–0.5)
LDLC SERPL CALC-MCNC: 48 MG/DL (ref 0–100)
LDLC/HDLC SERPL: 1 {RATIO}
LYMPHOCYTES # BLD AUTO: 2.33 10*3/MM3 (ref 0.7–3.1)
LYMPHOCYTES NFR BLD AUTO: 36.2 % (ref 19.6–45.3)
MCH RBC QN AUTO: 31.8 PG (ref 26.6–33)
MCHC RBC AUTO-ENTMCNC: 32.2 G/DL (ref 31.5–35.7)
MCV RBC AUTO: 98.9 FL (ref 79–97)
MICROALBUMIN/CREAT UR: 53.8 MG/G (ref 0–29)
MONOCYTES # BLD AUTO: 0.55 10*3/MM3 (ref 0.1–0.9)
MONOCYTES NFR BLD AUTO: 8.6 % (ref 5–12)
NEUTROPHILS NFR BLD AUTO: 3.15 10*3/MM3 (ref 1.7–7)
NEUTROPHILS NFR BLD AUTO: 48.9 % (ref 42.7–76)
NRBC BLD AUTO-RTO: 0 /100 WBC (ref 0–0.2)
PLATELET # BLD AUTO: 247 10*3/MM3 (ref 140–450)
PMV BLD AUTO: 10.8 FL (ref 6–12)
POTASSIUM SERPL-SCNC: 4.2 MMOL/L (ref 3.5–5.2)
PROT SERPL-MCNC: 7.5 G/DL (ref 6–8.5)
RBC # BLD AUTO: 3.8 10*6/MM3 (ref 3.77–5.28)
SODIUM SERPL-SCNC: 135 MMOL/L (ref 136–145)
T4 FREE SERPL-MCNC: 0.97 NG/DL (ref 0.93–1.7)
TRIGL SERPL-MCNC: 90 MG/DL (ref 0–150)
TSH SERPL DL<=0.05 MIU/L-ACNC: 3.07 UIU/ML (ref 0.27–4.2)
VLDLC SERPL-MCNC: 17 MG/DL (ref 5–40)
WBC NRBC COR # BLD AUTO: 6.43 10*3/MM3 (ref 3.4–10.8)

## 2023-11-16 PROCEDURE — 82570 ASSAY OF URINE CREATININE: CPT | Performed by: NURSE PRACTITIONER

## 2023-11-16 PROCEDURE — 80053 COMPREHEN METABOLIC PANEL: CPT | Performed by: INTERNAL MEDICINE

## 2023-11-16 PROCEDURE — 84443 ASSAY THYROID STIM HORMONE: CPT | Performed by: NURSE PRACTITIONER

## 2023-11-16 PROCEDURE — 85025 COMPLETE CBC W/AUTO DIFF WBC: CPT | Performed by: INTERNAL MEDICINE

## 2023-11-16 PROCEDURE — 82043 UR ALBUMIN QUANTITATIVE: CPT | Performed by: NURSE PRACTITIONER

## 2023-11-16 PROCEDURE — 82306 VITAMIN D 25 HYDROXY: CPT | Performed by: INTERNAL MEDICINE

## 2023-11-16 PROCEDURE — 84439 ASSAY OF FREE THYROXINE: CPT | Performed by: NURSE PRACTITIONER

## 2023-11-16 PROCEDURE — 80061 LIPID PANEL: CPT | Performed by: NURSE PRACTITIONER

## 2023-12-27 ENCOUNTER — TELEPHONE (OUTPATIENT)
Dept: FAMILY MEDICINE CLINIC | Facility: CLINIC | Age: 83
End: 2023-12-27
Payer: MEDICARE

## 2023-12-27 NOTE — TELEPHONE ENCOUNTER
"  Caller: Cristiane Bishop \"GAMA\"    Relationship to patient: Self    Best call back number:[phone]    Date of positive COVID19 test: 12/26/23    Date of possible COVID19 exposure:     COVID19 symptoms: COUGH, SNEEZING, HEADACHE    Date of initial quarantine:     Additional information or concerns:     What is the patients preferred pharmacy: [id number] [address] AT [facility] - [phone] [facility] [phone]  [phone]         "

## 2023-12-27 NOTE — TELEPHONE ENCOUNTER
CALLED AND NOTIFIED PATIENT. PATIENT WILL TRY OTC MEDICATIONS TO SELF TREAT AS SHE DOES NOT FEEL LIKE COMING INTO THE OFFICE AT THIS TIME.

## 2024-01-03 NOTE — PROGRESS NOTES
Venipuncture Blood Specimen Collection  Venipuncture performed in left arm by Humza Miller with good hemostasis. Patient tolerated the procedure well without complications.   11/16/2023  Humza Miller

## 2024-04-19 DIAGNOSIS — F32.A ANXIETY AND DEPRESSION: ICD-10-CM

## 2024-04-19 DIAGNOSIS — F41.9 ANXIETY AND DEPRESSION: ICD-10-CM

## 2024-04-30 ENCOUNTER — OFFICE VISIT (OUTPATIENT)
Dept: FAMILY MEDICINE CLINIC | Facility: CLINIC | Age: 84
End: 2024-04-30
Payer: MEDICARE

## 2024-04-30 VITALS
WEIGHT: 124.9 LBS | SYSTOLIC BLOOD PRESSURE: 160 MMHG | DIASTOLIC BLOOD PRESSURE: 84 MMHG | BODY MASS INDEX: 24.52 KG/M2 | TEMPERATURE: 96.6 F | HEART RATE: 68 BPM | HEIGHT: 60 IN | OXYGEN SATURATION: 98 %

## 2024-04-30 DIAGNOSIS — Z12.31 BREAST CANCER SCREENING BY MAMMOGRAM: ICD-10-CM

## 2024-04-30 DIAGNOSIS — Z00.00 MEDICARE ANNUAL WELLNESS VISIT, SUBSEQUENT: Primary | ICD-10-CM

## 2024-04-30 DIAGNOSIS — Z78.0 POSTMENOPAUSAL: ICD-10-CM

## 2024-04-30 DIAGNOSIS — Z71.85 VACCINE COUNSELING: ICD-10-CM

## 2024-04-30 DIAGNOSIS — F32.A ANXIETY AND DEPRESSION: ICD-10-CM

## 2024-04-30 DIAGNOSIS — E78.5 DYSLIPIDEMIA: ICD-10-CM

## 2024-04-30 DIAGNOSIS — Z91.81 AT MODERATE RISK FOR FALL: ICD-10-CM

## 2024-04-30 DIAGNOSIS — F41.9 ANXIETY AND DEPRESSION: ICD-10-CM

## 2024-04-30 DIAGNOSIS — I10 WHITE COAT SYNDROME WITH DIAGNOSIS OF HYPERTENSION: ICD-10-CM

## 2024-04-30 DIAGNOSIS — K21.9 GASTROESOPHAGEAL REFLUX DISEASE, UNSPECIFIED WHETHER ESOPHAGITIS PRESENT: ICD-10-CM

## 2024-04-30 DIAGNOSIS — R53.82 CHRONIC FATIGUE: ICD-10-CM

## 2024-04-30 DIAGNOSIS — I10 ESSENTIAL HYPERTENSION: ICD-10-CM

## 2024-04-30 DIAGNOSIS — E55.9 VITAMIN D DEFICIENCY: ICD-10-CM

## 2024-04-30 DIAGNOSIS — G47.00 INSOMNIA, UNSPECIFIED TYPE: ICD-10-CM

## 2024-04-30 LAB
25(OH)D3 SERPL-MCNC: 73 NG/ML (ref 30–100)
ALBUMIN SERPL-MCNC: 4.2 G/DL (ref 3.5–5.2)
ALBUMIN UR-MCNC: <1.2 MG/DL
ALBUMIN/GLOB SERPL: 1.4 G/DL
ALP SERPL-CCNC: 100 U/L (ref 39–117)
ALT SERPL W P-5'-P-CCNC: 13 U/L (ref 1–33)
ANION GAP SERPL CALCULATED.3IONS-SCNC: 7.3 MMOL/L (ref 5–15)
AST SERPL-CCNC: 27 U/L (ref 1–32)
BASOPHILS # BLD AUTO: 0.02 10*3/MM3 (ref 0–0.2)
BASOPHILS NFR BLD AUTO: 0.3 % (ref 0–1.5)
BILIRUB SERPL-MCNC: 0.7 MG/DL (ref 0–1.2)
BUN SERPL-MCNC: 18 MG/DL (ref 8–23)
BUN/CREAT SERPL: 19.8 (ref 7–25)
CALCIUM SPEC-SCNC: 9.7 MG/DL (ref 8.6–10.5)
CHLORIDE SERPL-SCNC: 98 MMOL/L (ref 98–107)
CHOLEST SERPL-MCNC: 127 MG/DL (ref 0–200)
CO2 SERPL-SCNC: 27.7 MMOL/L (ref 22–29)
CREAT SERPL-MCNC: 0.91 MG/DL (ref 0.57–1)
CREAT UR-MCNC: 36.1 MG/DL
DEPRECATED RDW RBC AUTO: 45.9 FL (ref 37–54)
EGFRCR SERPLBLD CKD-EPI 2021: 63.5 ML/MIN/1.73
EOSINOPHIL # BLD AUTO: 0.31 10*3/MM3 (ref 0–0.4)
EOSINOPHIL NFR BLD AUTO: 5 % (ref 0.3–6.2)
ERYTHROCYTE [DISTWIDTH] IN BLOOD BY AUTOMATED COUNT: 12.5 % (ref 12.3–15.4)
FERRITIN SERPL-MCNC: 151 NG/ML (ref 13–150)
FOLATE SERPL-MCNC: >20 NG/ML (ref 4.78–24.2)
GLOBULIN UR ELPH-MCNC: 3.1 GM/DL
GLUCOSE SERPL-MCNC: 93 MG/DL (ref 65–99)
HCT VFR BLD AUTO: 37.4 % (ref 34–46.6)
HDLC SERPL-MCNC: 49 MG/DL (ref 40–60)
HGB BLD-MCNC: 11.9 G/DL (ref 12–15.9)
IMM GRANULOCYTES # BLD AUTO: 0.01 10*3/MM3 (ref 0–0.05)
IMM GRANULOCYTES NFR BLD AUTO: 0.2 % (ref 0–0.5)
IRON 24H UR-MRATE: 98 MCG/DL (ref 37–145)
IRON SATN MFR SERPL: 26 % (ref 20–50)
LDLC SERPL CALC-MCNC: 62 MG/DL (ref 0–100)
LDLC/HDLC SERPL: 1.26 {RATIO}
LYMPHOCYTES # BLD AUTO: 2.17 10*3/MM3 (ref 0.7–3.1)
LYMPHOCYTES NFR BLD AUTO: 34.8 % (ref 19.6–45.3)
MCH RBC QN AUTO: 31.7 PG (ref 26.6–33)
MCHC RBC AUTO-ENTMCNC: 31.8 G/DL (ref 31.5–35.7)
MCV RBC AUTO: 99.7 FL (ref 79–97)
MICROALBUMIN/CREAT UR: NORMAL MG/G{CREAT}
MONOCYTES # BLD AUTO: 0.64 10*3/MM3 (ref 0.1–0.9)
MONOCYTES NFR BLD AUTO: 10.3 % (ref 5–12)
NEUTROPHILS NFR BLD AUTO: 3.08 10*3/MM3 (ref 1.7–7)
NEUTROPHILS NFR BLD AUTO: 49.4 % (ref 42.7–76)
NRBC BLD AUTO-RTO: 0 /100 WBC (ref 0–0.2)
PLATELET # BLD AUTO: 246 10*3/MM3 (ref 140–450)
PMV BLD AUTO: 10.7 FL (ref 6–12)
POTASSIUM SERPL-SCNC: 4.5 MMOL/L (ref 3.5–5.2)
PROT SERPL-MCNC: 7.3 G/DL (ref 6–8.5)
RBC # BLD AUTO: 3.75 10*6/MM3 (ref 3.77–5.28)
SODIUM SERPL-SCNC: 133 MMOL/L (ref 136–145)
T4 FREE SERPL-MCNC: 0.96 NG/DL (ref 0.93–1.7)
TIBC SERPL-MCNC: 374 MCG/DL (ref 298–536)
TRANSFERRIN SERPL-MCNC: 251 MG/DL (ref 200–360)
TRIGL SERPL-MCNC: 82 MG/DL (ref 0–150)
TSH SERPL DL<=0.05 MIU/L-ACNC: 4.27 UIU/ML (ref 0.27–4.2)
VIT B12 BLD-MCNC: 562 PG/ML (ref 211–946)
VLDLC SERPL-MCNC: 16 MG/DL (ref 5–40)
WBC NRBC COR # BLD AUTO: 6.23 10*3/MM3 (ref 3.4–10.8)

## 2024-04-30 PROCEDURE — 84439 ASSAY OF FREE THYROXINE: CPT | Performed by: NURSE PRACTITIONER

## 2024-04-30 PROCEDURE — 80061 LIPID PANEL: CPT | Performed by: NURSE PRACTITIONER

## 2024-04-30 PROCEDURE — 82043 UR ALBUMIN QUANTITATIVE: CPT | Performed by: NURSE PRACTITIONER

## 2024-04-30 PROCEDURE — 82746 ASSAY OF FOLIC ACID SERUM: CPT | Performed by: NURSE PRACTITIONER

## 2024-04-30 PROCEDURE — 82570 ASSAY OF URINE CREATININE: CPT | Performed by: NURSE PRACTITIONER

## 2024-04-30 PROCEDURE — 83540 ASSAY OF IRON: CPT | Performed by: NURSE PRACTITIONER

## 2024-04-30 PROCEDURE — 85025 COMPLETE CBC W/AUTO DIFF WBC: CPT | Performed by: NURSE PRACTITIONER

## 2024-04-30 PROCEDURE — 84466 ASSAY OF TRANSFERRIN: CPT | Performed by: NURSE PRACTITIONER

## 2024-04-30 PROCEDURE — 3077F SYST BP >= 140 MM HG: CPT | Performed by: NURSE PRACTITIONER

## 2024-04-30 PROCEDURE — 80053 COMPREHEN METABOLIC PANEL: CPT | Performed by: NURSE PRACTITIONER

## 2024-04-30 PROCEDURE — G0439 PPPS, SUBSEQ VISIT: HCPCS | Performed by: NURSE PRACTITIONER

## 2024-04-30 PROCEDURE — 82728 ASSAY OF FERRITIN: CPT | Performed by: NURSE PRACTITIONER

## 2024-04-30 PROCEDURE — 99214 OFFICE O/P EST MOD 30 MIN: CPT | Performed by: NURSE PRACTITIONER

## 2024-04-30 PROCEDURE — 84443 ASSAY THYROID STIM HORMONE: CPT | Performed by: NURSE PRACTITIONER

## 2024-04-30 PROCEDURE — 3079F DIAST BP 80-89 MM HG: CPT | Performed by: NURSE PRACTITIONER

## 2024-04-30 PROCEDURE — 1159F MED LIST DOCD IN RCRD: CPT | Performed by: NURSE PRACTITIONER

## 2024-04-30 PROCEDURE — 1170F FXNL STATUS ASSESSED: CPT | Performed by: NURSE PRACTITIONER

## 2024-04-30 PROCEDURE — 82607 VITAMIN B-12: CPT | Performed by: NURSE PRACTITIONER

## 2024-04-30 PROCEDURE — 82306 VITAMIN D 25 HYDROXY: CPT | Performed by: NURSE PRACTITIONER

## 2024-04-30 RX ORDER — ATORVASTATIN CALCIUM 20 MG/1
20 TABLET, FILM COATED ORAL DAILY
Qty: 90 TABLET | Refills: 1 | Status: SHIPPED | OUTPATIENT
Start: 2024-04-30

## 2024-04-30 RX ORDER — ASPIRIN 81 MG/1
81 TABLET ORAL DAILY
Qty: 90 TABLET | Refills: 3 | Status: SHIPPED | OUTPATIENT
Start: 2024-04-30

## 2024-04-30 RX ORDER — PAROXETINE HYDROCHLORIDE 20 MG/1
1 TABLET, FILM COATED ORAL DAILY
COMMUNITY
End: 2024-04-30 | Stop reason: SDUPTHER

## 2024-04-30 RX ORDER — OMEPRAZOLE 40 MG/1
40 CAPSULE, DELAYED RELEASE ORAL DAILY
Qty: 90 CAPSULE | Refills: 1 | Status: SHIPPED | OUTPATIENT
Start: 2024-04-30

## 2024-04-30 RX ORDER — PAROXETINE HYDROCHLORIDE 20 MG/1
20 TABLET, FILM COATED ORAL NIGHTLY
Qty: 90 TABLET | Refills: 1 | Status: SHIPPED | OUTPATIENT
Start: 2024-04-30

## 2024-04-30 RX ORDER — PAROXETINE HYDROCHLORIDE 20 MG/1
20 TABLET, FILM COATED ORAL NIGHTLY
Qty: 90 TABLET | Refills: 1 | OUTPATIENT
Start: 2024-04-30

## 2024-04-30 RX ORDER — LISINOPRIL 20 MG/1
20 TABLET ORAL DAILY
Qty: 90 TABLET | Refills: 1 | Status: SHIPPED | OUTPATIENT
Start: 2024-04-30

## 2024-04-30 NOTE — PATIENT INSTRUCTIONS
Fall Prevention in the Home, Adult  Falls can cause injuries and affect people of all ages. There are many simple things that you can do to make your home safe and to help prevent falls.  If you need it, ask for help making these changes.  What actions can I take to prevent falls?  General information  Use good lighting in all rooms. Make sure to:  Replace any light bulbs that burn out.  Turn on lights if it is dark and use night-lights.  Keep items that you use often in easy-to-reach places. Lower the shelves around your home if needed.  Move furniture so that there are clear paths around it.  Do not keep throw rugs or other things on the floor that can make you trip.  If any of your floors are uneven, fix them.  Add color or contrast paint or tape to clearly jackson and help you see:  Grab bars or handrails.  First and last steps of staircases.  Where the edge of each step is.  If you use a ladder or stepladder:  Make sure that it is fully opened. Do not climb a closed ladder.  Make sure the sides of the ladder are locked in place.  Have someone hold the ladder while you use it.  Know where your pets are as you move through your home.  What can I do in the bathroom?         Keep the floor dry. Clean up any water that is on the floor right away.  Remove soap buildup in the bathtub or shower. Buildup makes bathtubs and showers slippery.  Use non-skid mats or decals on the floor of the bathtub or shower.  Attach bath mats securely with double-sided, non-slip rug tape.  If you need to sit down while you are in the shower, use a non-slip stool.  Install grab bars by the toilet and in the bathtub and shower. Do not use towel bars as grab bars.  What can I do in the bedroom?  Make sure that you have a light by your bed that is easy to reach.  Do not use any sheets or blankets on your bed that hang to the floor.  Have a firm bench or chair with side arms that you can use for support when you get dressed.  What can I do in  the kitchen?  Clean up any spills right away.  If you need to reach something above you, use a sturdy step stool that has a grab bar.  Keep electrical cables out of the way.  Do not use floor polish or wax that makes floors slippery.  What can I do with my stairs?  Do not leave anything on the stairs.  Make sure that you have a light switch at the top and the bottom of the stairs. Have them installed if you do not have them.  Make sure that there are handrails on both sides of the stairs. Fix handrails that are broken or loose. Make sure that handrails are as long as the staircases.  Install non-slip stair treads on all stairs in your home if they do not have carpet.  Avoid having throw rugs at the top or bottom of stairs, or secure the rugs with carpet tape to prevent them from moving.  Choose a carpet design that does not hide the edge of steps on the stairs. Make sure that carpet is firmly attached to the stairs. Fix any carpet that is loose or worn.  What can I do on the outside of my home?  Use bright outdoor lighting.  Repair the edges of walkways and driveways and fix any cracks. Clear paths of anything that can make you trip, such as tools or rocks.  Add color or contrast paint or tape to clearly jackson and help you see high doorway thresholds.  Trim any bushes or trees on the main path into your home.  Check that handrails are securely fastened and in good repair. Both sides of all steps should have handrails.  Install guardrails along the edges of any raised decks or porches.  Have leaves, snow, and ice cleared regularly. Use sand, salt, or ice melt on walkways during winter months if you live where there is ice and snow.  In the garage, clean up any spills right away, including grease or oil spills.  What other actions can I take?  Review your medicines with your health care provider. Some medicines can make you confused or feel dizzy. This can increase your chance of falling.  Wear closed-toe shoes that  fit well and support your feet. Wear shoes that have rubber soles and low heels.  Use a cane, walker, scooter, or crutches that help you move around if needed.  Talk with your provider about other ways that you can decrease your risk of falls. This may include seeing a physical therapist to learn to do exercises to improve movement and strength.  Where to find more information  Centers for Disease Control and Prevention, DINA: cdc.gov  National [place] on Aging: sarah.nih.gov  National [place] on Aging: sarah.nih.gov  Contact a health care provider if:  You are afraid of falling at home.  You feel weak, drowsy, or dizzy at home.  You fall at home.  Get help right away if you:  Lose consciousness or have trouble moving after a fall.  Have a fall that causes a head injury.  These symptoms may be an emergency. Get help right away. Call 911.  Do not wait to see if the symptoms will go away.  Do not drive yourself to the hospital.  This information is not intended to replace advice given to you by your health care provider. Make sure you discuss any questions you have with your health care provider.  Document Revised: 08/21/2023 Document Reviewed: 08/21/2023  Elsevier Patient Education © 2024 [employer]

## 2024-04-30 NOTE — PROGRESS NOTES
The ABCs of the Annual Wellness Visit  Subsequent Medicare Wellness Visit    Subjective    Cristiane Bishop is a 83 y.o. female who presents for a Subsequent Medicare Wellness Visit.    The following portions of the patient's history were reviewed and updated as appropriate: allergies, current medications, past family history, past medical history, past social history, past surgical history, and problem list.    Compared to one year ago, the patient feels her physical health is worse - She states she can tell she is 'going downhill'. She does not have the energy that she used to. Her grandson is almost 8 and she used to be able to play soccer with him and she really can't do that anymore. She gets tired after a while.     Compared to one year ago, the patient feels her mental health is the same.    Recent Hospitalizations:  She was not admitted to the hospital during the last year.       Current Medical Providers:  Patient Care Team:  Azalea Abdullahi APRN as PCP - General (Nurse Practitioner)  Collette Mann APRN as Nurse Practitioner (Nurse Practitioner)  Rafita Pemberton MD as Consulting Physician (Hematology and Oncology)    Outpatient Medications Prior to Visit   Medication Sig Dispense Refill    Calcium Carb-Cholecalciferol (Calcium 600+D3) 600-200 MG-UNIT tablet Take 1 tablet by mouth Daily. Last dose 1 month ago      loratadine (CLARITIN) 10 MG tablet Take 1 tablet by mouth Every Night.      Magnesium 100 MG tablet Take 100 mg by mouth Daily.      Menaquinone-7 (VITAMIN K2 PO) Take  by mouth.      aspirin 81 MG EC tablet Take 1 tablet by mouth Daily.      atorvastatin (LIPITOR) 20 MG tablet Take 1 tablet by mouth Daily. 90 tablet 1    lisinopril (PRINIVIL,ZESTRIL) 20 MG tablet Take 1 tablet by mouth Daily. Pt takes one pill qd 90 tablet 1    omeprazole (priLOSEC) 40 MG capsule Take 1 capsule by mouth Daily. Indications: Gastroesophageal Reflux Disease 90 capsule 1    PARoxetine (PAXIL) 20 MG  "tablet Take 1 tablet by mouth Every Night. 90 tablet 1    PARoxetine (PAXIL) 20 MG tablet Take 1 tablet by mouth Daily.       No facility-administered medications prior to visit.       No opioid medication identified on active medication list. I have reviewed chart for other potential  high risk medication/s and harmful drug interactions in the elderly.        Aspirin is on active medication list. Aspirin use is indicated based on review of current medical condition/s. Pros and cons of this therapy have been discussed today. Benefits of this medication outweigh potential harm.  Patient has been encouraged to continue taking this medication.        Patient Active Problem List   Diagnosis    Breast cancer in female    Heel pain    Allergic rhinitis    Esophageal reflux    Heel spur    History of breast cancer    Essential hypertension    HTN (hypertension)    Ingrown toenail    Depression    Mood disorder    Osteopenia    Bacterial infection due to H. pylori    Iron deficiency anemia due to chronic blood loss    Heartburn    Abnormal CT scan, stomach     Advance Care Planning   Advance Care Planning     Advance Directive is on file.  ACP discussion was held with the patient during this visit. Patient has an advance directive in EMR which is still valid.      Objective    Vitals:    04/30/24 0923   BP: 160/84   BP Location: Left arm   Patient Position: Sitting   Pulse: 68   Temp: 96.6 °F (35.9 °C)   TempSrc: Temporal   SpO2: 98%   Weight: 56.7 kg (124 lb 14.4 oz)   Height: 152.4 cm (60\")     Estimated body mass index is 24.39 kg/m² as calculated from the following:    Height as of this encounter: 152.4 cm (60\").    Weight as of this encounter: 56.7 kg (124 lb 14.4 oz).    BMI is within normal parameters. No other follow-up for BMI required.      Does the patient have evidence of cognitive impairment? No - sometimes she does forget peoples names, but usually can recall them later/after the fact when she doesn't need " them anymore. She is not getting lost while driving. No issues paying bills, or balancing check book.           HEALTH RISK ASSESSMENT    Smoking Status:  Social History     Tobacco Use   Smoking Status Never   Smokeless Tobacco Never     Alcohol Consumption:  Social History     Substance and Sexual Activity   Alcohol Use Never     Fall Risk Screen:    DINA Fall Risk Assessment was completed, and patient is at MODERATE risk for falls. Assessment completed on:2024    Depression Screenin/30/2024     9:21 AM   PHQ-2/PHQ-9 Depression Screening   Little Interest or Pleasure in Doing Things 0-->not at all   Feeling Down, Depressed or Hopeless 0-->not at all   PHQ-9: Brief Depression Severity Measure Score 0       Health Habits and Functional and Cognitive Screenin/30/2024     9:19 AM   Functional & Cognitive Status   Do you have difficulty preparing food and eating? No   Do you have difficulty bathing yourself, getting dressed or grooming yourself? No   Do you have difficulty using the toilet? No   Do you have difficulty moving around from place to place? No   Do you have trouble with steps or getting out of a bed or a chair? No   Current Diet Well Balanced Diet   Dental Exam Up to date   Eye Exam Up to date   Exercise (times per week) 7 times per week   Current Exercises Include Walking;Other   Do you need help using the phone?  No   Are you deaf or do you have serious difficulty hearing?  Yes   Do you need help to go to places out of walking distance? No   Do you need help shopping? No   Do you need help preparing meals?  No   Do you need help with housework?  No   Do you need help with laundry? No   Do you need help taking your medications? No   Do you need help managing money? No   Do you ever drive or ride in a car without wearing a seat belt? No   Have you felt unusual stress, anger or loneliness in the last month? No   Who do you live with? Other   If you need help, do you have trouble  finding someone available to you? No   Have you been bothered in the last four weeks by sexual problems? No   Do you have difficulty concentrating, remembering or making decisions? No       Age-appropriate Screening Schedule:  Refer to the list below for future screening recommendations based on patient's age, sex and/or medical conditions. Orders for these recommended tests are listed in the plan section. The patient has been provided with a written plan.    Health Maintenance   Topic Date Due    RSV Vaccine - Adults (1 - 1-dose 60+ series) Never done    COVID-19 Vaccine (7 - 2023-24 season) 02/17/2024    INFLUENZA VACCINE  08/01/2024    DXA SCAN  09/13/2024    MAMMOGRAM  09/15/2024    TDAP/TD VACCINES (3 - Td or Tdap) 09/15/2024    ANNUAL WELLNESS VISIT  04/30/2025    Pneumococcal Vaccine 65+  Completed    ZOSTER VACCINE  Completed                  CMS Preventative Services Quick Reference  Risk Factors Identified During Encounter    Fall Risk-High or Moderate: Discussed Fall Prevention in the home -she is actually considered moderate risk for falls due to a near fall when she tried to repair something on her barn.  We discussed calling on family to help with such things.  Immunizations Discussed/Encouraged: RSV (Respiratory Syncytial Virus) -RSV vaccination and COVID-19 booster were discussed.  Vaccination records will be obtained from [facility].  Dental Screening Recommended  Vision Screening Recommended    The above risks/problems have been discussed with the patient.    Pertinent information has been shared with the patient in the After Visit Summary.  An After Visit Summary and PPPS were made available to the patient.    Follow Up:   Next Medicare Wellness visit to be scheduled in 1 year.       Additional E&M Note during same encounter follows:  Patient has multiple medical problems which are significant and separately identifiable that require additional work above and beyond the Medicare Wellness Visit.   "    Chief Complaint  Medicare Wellness-subsequent, Anxiety, Hyperlipidemia, Hypertension, and Depression    Subjective        HPI  Cristiane Bishop is also being seen today for follow-up on chronic health conditions and for medication refills.    She is currently prescribed Paxil for treatment of anxiety and depression.  She feels that her symptoms are stable overall.  She denies any homicidal ideations or suicidal ideations.  She denies any AVH.  She does endorse fatigue.  She feels that her age may be catching up with her.  She tires more easily now.  She has noticed this when interacting with her grandchildren.  She gets approximately 6 to 7 hours of sleep each night.  She does endorse having difficulty falling asleep.  She states that this is seemingly a \"family trait\".  She has a hard time shutting her mind off.  She has tried melatonin over-the-counter but it did not do anything for her.  Benadryl typically hypes her up and does not calm her down.  She has not tried doxylamine or Unisom over-the-counter.    She is prescribed atorvastatin for dyslipidemia.  No complaints of myalgia with use.    She has essential hypertension with whitecoat syndrome.  Blood pressure is elevated on exam today, 160/84.  She monitors her blood pressure at home and blood pressure is consistently less than 140/80 per her report.  She states that coming here makes her nervous/anxious.  She denies chest pain, palpitations, headaches, dizziness, or lower extremity edema.  She may get short of breath if she overexerts herself, but usually she is not short of breath.  She does take her lisinopril 20 mg daily as prescribed.    She is treated for GERD with omeprazole 40 mg daily.  She currently denies any dysphagia, nausea, vomiting, or abdominal pain.  Her weight has been stable for the past year.  She states that she is no longer seeing gastroenterology.  Her last EGD was January 2022 with Dr. Damon.  A medium size hiatal hernia was " "noted along with LA grade C esophagitis.  Stomach and duodenum was normal.  Endoscopy report did not show recommended follow-up.  Pathology showed squamocolumnar mucosa with fibropurulent debris, consistent with ulcer of the distal esophagus, but negative for intestinal metaplasia, dysplasia, and malignancy.  Gram stain was negative for fungal forms.    She is up-to-date on her colonoscopy.  She had a colonoscopy with Dr. Damon in August 2021 and the entire colon was normal.    She continues to see Dr. Pemberton for history of breast cancer.  Mammogram and DEXA have been ordered and are scheduled for September of this year.         Objective   Vital Signs:  /84 (BP Location: Left arm, Patient Position: Sitting)   Pulse 68   Temp 96.6 °F (35.9 °C) (Temporal)   Ht 152.4 cm (60\")   Wt 56.7 kg (124 lb 14.4 oz)   SpO2 98%   BMI 24.39 kg/m²     Physical Exam  Vitals reviewed.   Constitutional:       General: She is not in acute distress.     Appearance: Normal appearance. She is well-developed and normal weight.   HENT:      Head: Normocephalic and atraumatic.   Eyes:      General: No scleral icterus.        Right eye: No discharge.         Left eye: No discharge.      Extraocular Movements: Extraocular movements intact.      Conjunctiva/sclera: Conjunctivae normal.   Neck:      Thyroid: No thyroid mass, thyromegaly or thyroid tenderness.      Vascular: No carotid bruit.      Trachea: Trachea normal.   Cardiovascular:      Rate and Rhythm: Normal rate and regular rhythm.      Pulses: Normal pulses.           Dorsalis pedis pulses are 2+ on the right side and 2+ on the left side.        Posterior tibial pulses are 2+ on the right side and 2+ on the left side.      Heart sounds: No murmur heard.  Pulmonary:      Effort: Pulmonary effort is normal.      Breath sounds: Normal breath sounds.   Abdominal:      General: Bowel sounds are normal. There is no distension.      Palpations: Abdomen is soft. There is no mass. "      Tenderness: There is no abdominal tenderness.   Musculoskeletal:         General: Normal range of motion.      Cervical back: Normal range of motion and neck supple. No tenderness.      Right lower leg: No edema.      Left lower leg: No edema.   Lymphadenopathy:      Cervical: No cervical adenopathy.   Skin:     General: Skin is warm and dry.   Neurological:      Mental Status: She is alert and oriented to person, place, and time.   Psychiatric:         Mood and Affect: Mood and affect normal.         Behavior: Behavior normal.         Thought Content: Thought content normal.         Judgment: Judgment normal.          The following data was reviewed by: ORLIN Denson on 04/30/2024:    No visits with results within 1 Month(s) from this visit.   Latest known visit with results is:   Clinical Support on 11/16/2023   Component Date Value    Glucose 11/16/2023 104 (H)     BUN 11/16/2023 17     Creatinine 11/16/2023 0.88     Sodium 11/16/2023 135 (L)     Potassium 11/16/2023 4.2     Chloride 11/16/2023 98     CO2 11/16/2023 30.5 (H)     Calcium 11/16/2023 9.4     Total Protein 11/16/2023 7.5     Albumin 11/16/2023 4.1     ALT (SGPT) 11/16/2023 14     AST (SGOT) 11/16/2023 24     Alkaline Phosphatase 11/16/2023 110     Total Bilirubin 11/16/2023 0.8     Globulin 11/16/2023 3.4     A/G Ratio 11/16/2023 1.2     BUN/Creatinine Ratio 11/16/2023 19.3     Anion Gap 11/16/2023 6.5     eGFR 11/16/2023 66.1     25 Hydroxy, Vitamin D 11/16/2023 29.4 (L)     Total Cholesterol 11/16/2023 112     Triglycerides 11/16/2023 90     HDL Cholesterol 11/16/2023 47     LDL Cholesterol  11/16/2023 48     VLDL Cholesterol 11/16/2023 17     LDL/HDL Ratio 11/16/2023 1.00     TSH 11/16/2023 3.070     Free T4 11/16/2023 0.97     Microalbumin/Creatinine * 11/16/2023 53.8 (H)     Creatinine, Urine 11/16/2023 85.5     Microalbumin, Urine 11/16/2023 4.6     WBC 11/16/2023 6.43     RBC 11/16/2023 3.80     Hemoglobin 11/16/2023 12.1      Hematocrit 11/16/2023 37.6     MCV 11/16/2023 98.9 (H)     MCH 11/16/2023 31.8     MCHC 11/16/2023 32.2     RDW 11/16/2023 13.4     RDW-SD 11/16/2023 48.6     MPV 11/16/2023 10.8     Platelets 11/16/2023 247     Neutrophil % 11/16/2023 48.9     Lymphocyte % 11/16/2023 36.2     Monocyte % 11/16/2023 8.6     Eosinophil % 11/16/2023 5.6     Basophil % 11/16/2023 0.5     Immature Grans % 11/16/2023 0.2     Neutrophils, Absolute 11/16/2023 3.15     Lymphocytes, Absolute 11/16/2023 2.33     Monocytes, Absolute 11/16/2023 0.55     Eosinophils, Absolute 11/16/2023 0.36     Basophils, Absolute 11/16/2023 0.03     Immature Grans, Absolute 11/16/2023 0.01     nRBC 11/16/2023 0.0      UPPER GI ENDOSCOPY (01/17/2022 08:32)   Tissue Pathology Exam (01/17/2022 08:50)     COLONOSCOPY (08/30/2021 12:05)      Assessment and Plan   Diagnoses and all orders for this visit:    1. Medicare annual wellness visit, subsequent (Primary)    2. Vaccine counseling  Comments:  Age appropriate: RSV, COVID-19 vaccination recommended. TDaP will be due around 09/2024    3. Breast cancer screening by mammogram  Comments:  Scheduled for 09/17/2024    4. Postmenopausal  Comments:  DEXA scheduled for 09/17/2024    5. At moderate risk for fall    6. White coat syndrome with diagnosis of hypertension  -     lisinopril (PRINIVIL,ZESTRIL) 20 MG tablet; Take 1 tablet by mouth Daily. Pt takes one pill qd  Dispense: 90 tablet; Refill: 1    7. Essential hypertension  -     aspirin 81 MG EC tablet; Take 1 tablet by mouth Daily.  Dispense: 90 tablet; Refill: 3  -     CBC Auto Differential  -     Comprehensive Metabolic Panel  -     Lipid Panel  -     TSH+Free T4  -     Microalbumin / Creatinine Urine Ratio - Urine, Clean Catch  -     Iron Profile  -     Ferritin    8. Dyslipidemia  -     atorvastatin (LIPITOR) 20 MG tablet; Take 1 tablet by mouth Daily.  Dispense: 90 tablet; Refill: 1  -     Comprehensive Metabolic Panel  -     Lipid Panel    9. Gastroesophageal  reflux disease, unspecified whether esophagitis present  -     omeprazole (priLOSEC) 40 MG capsule; Take 1 capsule by mouth Daily. Indications: Gastroesophageal Reflux Disease  Dispense: 90 capsule; Refill: 1  -     Iron Profile  -     Ferritin    10. Anxiety and depression  -     PARoxetine (PAXIL) 20 MG tablet; Take 1 tablet by mouth Every Night.  Dispense: 90 tablet; Refill: 1  -     Iron Profile  -     Ferritin  -     Vitamin B12 & Folate    11. Insomnia, unspecified type  -     doxylamine (UNISOM) 25 MG tablet; Take 1 tablet by mouth At Night As Needed for Sleep.  Dispense: 30 tablet; Refill: 0  -     Iron Profile  -     Ferritin  -     Vitamin B12 & Folate    12. Chronic fatigue  -     Iron Profile  -     Ferritin  -     Vitamin B12 & Folate    13. Vitamin D deficiency  -     Vitamin D,25-Hydroxy             Follow Up     Return in about 6 months (around 10/30/2024).    Continue current medications as prescribed to include lisinopril, aspirin, atorvastatin, omeprazole, and Paxil.  I am going to add Unisom as a trial for the insomnia.  She will let me know within the next 2 to 3 weeks if this is helpful or not.  Monitor for any daytime somnolence.  I will also further evaluate her fatigue with iron profile, ferritin, B12 and folate.  We will contact her with labs once received.    Patient was given instructions and counseling regarding her condition or for health maintenance advice. Please see specific information pulled into the AVS if appropriate.

## 2024-05-01 DIAGNOSIS — R53.82 CHRONIC FATIGUE: Primary | ICD-10-CM

## 2024-05-01 DIAGNOSIS — R94.6 ABNORMAL THYROID FUNCTION TEST: ICD-10-CM

## 2024-05-01 DIAGNOSIS — R79.89 ABNORMAL CBC: ICD-10-CM

## 2024-06-04 ENCOUNTER — OFFICE VISIT (OUTPATIENT)
Dept: ORTHOPEDIC SURGERY | Facility: CLINIC | Age: 84
End: 2024-06-04
Payer: MEDICARE

## 2024-06-04 VITALS
OXYGEN SATURATION: 96 % | WEIGHT: 124 LBS | SYSTOLIC BLOOD PRESSURE: 156 MMHG | HEIGHT: 60 IN | BODY MASS INDEX: 24.35 KG/M2 | DIASTOLIC BLOOD PRESSURE: 69 MMHG | HEART RATE: 76 BPM

## 2024-06-04 DIAGNOSIS — M25.552 LEFT HIP PAIN: Primary | ICD-10-CM

## 2024-06-04 PROCEDURE — 3078F DIAST BP <80 MM HG: CPT | Performed by: ORTHOPAEDIC SURGERY

## 2024-06-04 PROCEDURE — 3077F SYST BP >= 140 MM HG: CPT | Performed by: ORTHOPAEDIC SURGERY

## 2024-06-04 PROCEDURE — 99203 OFFICE O/P NEW LOW 30 MIN: CPT | Performed by: ORTHOPAEDIC SURGERY

## 2024-06-04 NOTE — PROGRESS NOTES
"Chief Complaint  Pain and Initial Evaluation of the Left Hip     Subjective      Cristiane Bishop presents to [facility] for initial evaluation of the left hip. She is having pain in the left hip.  She has pain with prolonged standing, ambulation and stairs.  The only thing she can think made pain was riding on the .  She has pain in the low back.  She has had no injury or fall.     Allergies   Allergen Reactions    Tetracycline Nausea Only and Rash     Difficulty swallowing      Penicillins Rash    Sulfa Antibiotics Rash        Social History     Socioeconomic History    Marital status:    Tobacco Use    Smoking status: Never    Smokeless tobacco: Never   Vaping Use    Vaping status: Never Used   Substance and Sexual Activity    Alcohol use: Never    Drug use: Never    Sexual activity: Defer        I reviewed the patient's chief complaint, history of present illness, review of systems, past medical history, surgical history, family history, social history, medications, and allergy list.     Review of Systems     Constitutional: Denies fevers, chills, weight loss  Cardiovascular: Denies chest pain, shortness of breath  Skin: Denies rashes, acute skin changes  Neurologic: Denies headache, loss of consciousness        Vital Signs:   /69   Pulse 76   Ht 152.4 cm (60\")   Wt 56.2 kg (124 lb)   SpO2 96%   BMI 24.22 kg/m²          Physical Exam  General: Alert. No acute distress    Ortho Exam        LEFT HIP  No skin discoloration, atrophy or swelling. Positive EHL, FHL, GS, and TA. Sensation intact to all 5 nerves of the foot. Negative straight leg raise. Leg lengths appear equal. Ambulates with Non-antalgic gait Negative Lesley. Negative Guillermo. Good strength to hamstrings, quadriceps, dorsiflexors, and plantar flexors. Knee Extensor Mechanism  intact        Procedures      Imaging Results (Most Recent)       Procedure Component Value Units Date/Time    XR Hip " With or Without Pelvis 2 - 3 View Left [[id number]] Resulted: 06/04/24 1111     Updated: 06/04/24 1112             Result Review :     X-Ray Report:  Left hip X-Ray  Indication: Evaluation of the left hip   AP/Lateral view(s)  Findings: Mild to moderate degenerative changes.  No acute fracture or dislocation.   Prior studies available for comparison: no             Assessment and Plan     Diagnoses and all orders for this visit:    1. Left hip pain (Primary)  -     XR Hip With or Without Pelvis 2 - 3 View Left        Discussed the treatment plan with the patient. I reviewed the X-rays that were obtained today with the patient.     HEP exercises. Prescribed topical cream.       Call or return if worsening symptoms.    Follow Up     PRN.  She can call back for an injection or physical therapy.       Patient was given instructions and counseling regarding her condition or for health maintenance advice. Please see specific information pulled into the AVS if appropriate.     Scribed for Daniel Zuleta MD by La Abbott MA.  06/04/24   11:09 EDT    I have personally performed the services described in this document as scribed by the above individual and it is both accurate and complete. Daniel Zuleta MD 06/04/24

## 2024-07-22 ENCOUNTER — CLINICAL SUPPORT (OUTPATIENT)
Dept: FAMILY MEDICINE CLINIC | Facility: CLINIC | Age: 84
End: 2024-07-22
Payer: MEDICARE

## 2024-07-22 DIAGNOSIS — R94.6 ABNORMAL THYROID FUNCTION TEST: ICD-10-CM

## 2024-07-22 DIAGNOSIS — R53.82 CHRONIC FATIGUE: ICD-10-CM

## 2024-07-22 DIAGNOSIS — R79.89 ABNORMAL CBC: ICD-10-CM

## 2024-07-22 DIAGNOSIS — D64.9 ANEMIA, UNSPECIFIED TYPE: ICD-10-CM

## 2024-07-22 LAB
BASOPHILS # BLD AUTO: 0.02 10*3/MM3 (ref 0–0.2)
BASOPHILS NFR BLD AUTO: 0.4 % (ref 0–1.5)
DEPRECATED RDW RBC AUTO: 45.8 FL (ref 37–54)
EOSINOPHIL # BLD AUTO: 0.24 10*3/MM3 (ref 0–0.4)
EOSINOPHIL NFR BLD AUTO: 5.1 % (ref 0.3–6.2)
ERYTHROCYTE [DISTWIDTH] IN BLOOD BY AUTOMATED COUNT: 12.5 % (ref 12.3–15.4)
HCT VFR BLD AUTO: 31.6 % (ref 34–46.6)
HGB BLD-MCNC: 10.2 G/DL (ref 12–15.9)
IMM GRANULOCYTES # BLD AUTO: 0.01 10*3/MM3 (ref 0–0.05)
IMM GRANULOCYTES NFR BLD AUTO: 0.2 % (ref 0–0.5)
LYMPHOCYTES # BLD AUTO: 1.99 10*3/MM3 (ref 0.7–3.1)
LYMPHOCYTES NFR BLD AUTO: 42.2 % (ref 19.6–45.3)
MCH RBC QN AUTO: 32.4 PG (ref 26.6–33)
MCHC RBC AUTO-ENTMCNC: 32.3 G/DL (ref 31.5–35.7)
MCV RBC AUTO: 100.3 FL (ref 79–97)
MONOCYTES # BLD AUTO: 0.52 10*3/MM3 (ref 0.1–0.9)
MONOCYTES NFR BLD AUTO: 11 % (ref 5–12)
NEUTROPHILS NFR BLD AUTO: 1.94 10*3/MM3 (ref 1.7–7)
NEUTROPHILS NFR BLD AUTO: 41.1 % (ref 42.7–76)
NRBC BLD AUTO-RTO: 0 /100 WBC (ref 0–0.2)
PLATELET # BLD AUTO: 211 10*3/MM3 (ref 140–450)
PMV BLD AUTO: 10.7 FL (ref 6–12)
RBC # BLD AUTO: 3.15 10*6/MM3 (ref 3.77–5.28)
TSH SERPL DL<=0.05 MIU/L-ACNC: 3.14 UIU/ML (ref 0.27–4.2)
WBC NRBC COR # BLD AUTO: 4.72 10*3/MM3 (ref 3.4–10.8)

## 2024-07-22 PROCEDURE — 82728 ASSAY OF FERRITIN: CPT | Performed by: NURSE PRACTITIONER

## 2024-07-22 PROCEDURE — 84443 ASSAY THYROID STIM HORMONE: CPT | Performed by: NURSE PRACTITIONER

## 2024-07-22 PROCEDURE — 84466 ASSAY OF TRANSFERRIN: CPT | Performed by: NURSE PRACTITIONER

## 2024-07-22 PROCEDURE — 36415 COLL VENOUS BLD VENIPUNCTURE: CPT | Performed by: NURSE PRACTITIONER

## 2024-07-22 PROCEDURE — 85025 COMPLETE CBC W/AUTO DIFF WBC: CPT | Performed by: NURSE PRACTITIONER

## 2024-07-22 PROCEDURE — 83540 ASSAY OF IRON: CPT | Performed by: NURSE PRACTITIONER

## 2024-07-22 NOTE — PROGRESS NOTES
Venipuncture Blood Specimen Collection  Venipuncture performed in left arm  by Patricia Morris with good hemostasis. Patient tolerated the procedure well without complications.   07/22/24   Patricia Morris

## 2024-07-23 DIAGNOSIS — D64.9 ANEMIA, UNSPECIFIED TYPE: Primary | ICD-10-CM

## 2024-07-23 LAB
FERRITIN SERPL-MCNC: 147 NG/ML (ref 13–150)
IRON 24H UR-MRATE: 84 MCG/DL (ref 37–145)
IRON SATN MFR SERPL: 26 % (ref 20–50)
TIBC SERPL-MCNC: 319 MCG/DL (ref 298–536)
TRANSFERRIN SERPL-MCNC: 214 MG/DL (ref 200–360)

## 2024-07-31 ENCOUNTER — OFFICE VISIT (OUTPATIENT)
Dept: GASTROENTEROLOGY | Facility: CLINIC | Age: 84
End: 2024-07-31
Payer: MEDICARE

## 2024-07-31 VITALS
DIASTOLIC BLOOD PRESSURE: 51 MMHG | SYSTOLIC BLOOD PRESSURE: 143 MMHG | WEIGHT: 124.6 LBS | BODY MASS INDEX: 24.46 KG/M2 | HEIGHT: 60 IN | HEART RATE: 70 BPM

## 2024-07-31 DIAGNOSIS — D64.9 ANEMIA, UNSPECIFIED TYPE: Primary | ICD-10-CM

## 2024-07-31 NOTE — PROGRESS NOTES
Patient Name: Cristiane Bishop   Visit Date: 07/31/2024   Patient ID: [id number]  Provider: ORLIN Mccain    Sex: female  Location:  Location Address:  Location Phone: [phone]  [place] [address] [phone]    YOB: 1940  Age: 84 y.o.   Primary Care Provider Azalea Abdullahi APRN      Referring Provider: CANDIDA Denson*        Chief Complaint  Anemia    History of Present Illness    Patient initially presented July 2021 with complaint of stool positive for H. pylori and cannot take antibiotics due to allergy to penicillin and tetracycline, she was tried on Flagyl and Tetracycline but reported it made her feel more ill.  Reports test was done due to mouth burning complaint.  Patient also had persistent heartburn with Protonix 40 mg and had added omeprazole 20 over-the-counter to regimen.     EGD/colonoscopy 8/30/2021: Small hiatal hernia, erosions noted in the stomach with recent bleeding noted-gastritis with positive H. pylori noted, duodenum normal good prep, negative colonoscopy.  Patient was advised to have repeat EGD due to medication allergies so that culture and sensitivity could be evaluated  EGD 9/27/2021: Small hiatal hernia, gastritis noted, normal duodenum-C&S report showed amoxicillin, Levaquin, clarithromycin and tetracycline were all susceptible only Flagyl was resistant; after review with Dr. Damon, we treated with Levaquin, clarithromycin, and bismuth    Patient was last seen 12/14/2021 and was doing well    H. pylori stool antigen testing ordered and negative 3/1/22    Patient presented today for anemia, patient has no complaints of abdominal pain, no diarrhea, no blood in the stool or black stool seen.  She is feeling well.  Stable weight  Reviewed labs-hemoglobin is noted lower at 10 but patient has normal iron and ferritin, she denies taking any iron supplements.  There is no microcytic anemia.  Past Medical History:   Diagnosis Date     Allergic rhinitis     Breast cancer     RIGHT    GERD (gastroesophageal reflux disease)     HTN (hypertension)     Hypertension     Ingrown toenail     Mood disorder        Past Surgical History:   Procedure Laterality Date    ABDOMINAL HYSTERECTOMY      APPENDECTOMY      BREAST BIOPSY Right     COLONOSCOPY N/A 8/30/2021    Procedure: COLONOSCOPY;  Surgeon: Norman Damon MD;  Location: [facility];  Service: Gastroenterology;  Laterality: N/A;  NORMAL COLON    ENDOSCOPY N/A 8/30/2021    Procedure: ESOPHAGOGASTRODUODENOSCOPY WITH BIOPSY;  Surgeon: Norman Damon MD;  Location: [facility];  Service: Gastroenterology;  Laterality: N/A;  GASTRITIS/HIATAL HERNIA    ENDOSCOPY N/A 9/27/2021    Procedure: ESOPHAGOGASTRODUODENOSCOPY with biopsies;  Surgeon: Norman Damon MD;  Location: [facility];  Service: Gastroenterology;  Laterality: N/A;  GASTRITIS, HIATAL HERNIA    ENDOSCOPY N/A 1/17/2022    Procedure: ESOPHAGOGASTRODUODENOSCOPY WITH BX;  Surgeon: Norman Damon MD;  Location: [facility];  Service: Gastroenterology;  Laterality: N/A;  HIATAL HERNIA, REFULX ESOPHAGITIS    HYSTERECTOMY      MASTECTOMY      MASTECTOMY Right 05/2015       Allergies   Allergen Reactions    Tetracycline Nausea Only and Rash     Difficulty swallowing      Penicillins Rash    Sulfa Antibiotics Rash       Family History   Problem Relation Age of Onset    Lymphoma Mother     Breast cancer Mother     Cancer Mother     Lymphoma Maternal Uncle     Diabetes Maternal Uncle     Heart disease Other     Diabetes Paternal Uncle     Lymphoma Other     Stroke Other     Colon cancer Neg Hx     Malig Hyperthermia Neg Hx         Social History     Tobacco Use    Smoking status: Never    Smokeless tobacco: Never   Vaping Use    Vaping status: Never Used   Substance Use Topics    Alcohol use: Never    Drug use: Never       Objective     Vital Signs:   /51 (BP Location: Left arm, Patient Position: Sitting,  "Cuff Size: Adult)   Pulse 70   Ht 152.4 cm (60\")   Wt 56.5 kg (124 lb 9.6 oz)   BMI 24.33 kg/m²       Physical Exam  Constitutional:       General: The patient is not in acute distress.     Appearance: Normal appearance.   HENT:      Head: Normocephalic and atraumatic.      Nose: Nose normal.   Pulmonary:      Effort: Pulmonary effort is normal. No respiratory distress.   Abdominal:      General: Abdomen is flat.      Palpations: Abdomen is soft. There is no mass.      Tenderness: There is no abdominal tenderness. There is no guarding.   Musculoskeletal:      Cervical back: Neck supple.      Right lower leg: No edema.      Left lower leg: No edema.   Skin:     General: Skin is warm and dry.   Neurological:      General: No focal deficit present.      Mental Status: The patient is alert and oriented to person, place, and time.      Gait: Gait normal.   Psychiatric:         Mood and Affect: Mood normal.         Speech: Speech normal.         Behavior: Behavior normal.         Thought Content: Thought content normal.     Result Review :   The following data was reviewed by: ORLIN Mccain on 07/31/2024:    CBC w/diff          11/16/2023    09:20 4/30/2024    09:59 7/22/2024    13:25   CBC w/Diff   WBC 6.43  6.23  4.72    RBC 3.80  3.75  3.15    Hemoglobin 12.1  11.9  10.2    Hematocrit 37.6  37.4  31.6    MCV 98.9  99.7  100.3    MCH 31.8  31.7  32.4    MCHC 32.2  31.8  32.3    RDW 13.4  12.5  12.5    Platelets 247  246  211    Neutrophil Rel % 48.9  49.4  41.1    Immature Granulocyte Rel % 0.2  0.2  0.2    Lymphocyte Rel % 36.2  34.8  42.2    Monocyte Rel % 8.6  10.3  11.0    Eosinophil Rel % 5.6  5.0  5.1    Basophil Rel % 0.5  0.3  0.4      CMP          11/16/2023    09:20 4/30/2024    09:59   CMP   Glucose 104  93    BUN 17  18    Creatinine 0.88  0.91    EGFR 66.1  63.5    Sodium 135  133    Potassium 4.2  4.5    Chloride 98  98    Calcium 9.4  9.7    Total Protein 7.5  7.3    Albumin 4.1  4.2  "   Globulin 3.4  3.1    Total Bilirubin 0.8  0.7    Alkaline Phosphatase 110  100    AST (SGOT) 24  27    ALT (SGPT) 14  13    Albumin/Globulin Ratio 1.2  1.4    BUN/Creatinine Ratio 19.3  19.8    Anion Gap 6.5  7.3        Iron Profile   Iron   Date Value Ref Range Status   07/22/2024 84 37 - 145 mcg/dL Final     TIBC   Date Value Ref Range Status   07/22/2024 319 298 - 536 mcg/dL Final     Iron Saturation (TSAT)   Date Value Ref Range Status   07/22/2024 26 20 - 50 % Final     Transferrin   Date Value Ref Range Status   07/22/2024 214 200 - 360 mg/dL Final     Ferritin   Ferritin   Date Value Ref Range Status   07/22/2024 147.00 13.00 - 150.00 ng/mL Final               Assessment and Plan    Diagnoses and all orders for this visit:    1. Anemia, unspecified type (Primary)  -     Vitamin B12; Future  -     Folate; Future  -     Celiac Disease Antibody Screen; Future  -     Reticulocytes; Future  -     Occult Blood, Fecal By Immunoassay - Stool, Per Rectum; Future  -     Occult Blood, Fecal By Immunoassay - Stool, Per Rectum; Future  -     Occult Blood, Fecal By Immunoassay - Stool, Per Rectum; Future              Follow Up   Return for f/u after testing.  As patient does not have iron deficiency anemia, MCH is elevated/no microcytic anemia, I reviewed with Dr. Damon and he recommended checking reticulocyte count, stool check for occult blood and we will also check vitamin B12 and celiac serology  Pt agreeable to plan. If reticulocyte count up or blood in stool will proceed with colonoscopy.     Patient was given instructions and counseling regarding her condition or for health maintenance advice. Please see specific information pulled into the AVS if appropriate.

## 2024-08-05 ENCOUNTER — LAB (OUTPATIENT)
Dept: LAB | Facility: HOSPITAL | Age: 84
End: 2024-08-05
Payer: MEDICARE

## 2024-08-05 DIAGNOSIS — D64.9 ANEMIA, UNSPECIFIED TYPE: ICD-10-CM

## 2024-08-05 LAB
FOLATE SERPL-MCNC: >20 NG/ML (ref 4.78–24.2)
HEMOCCULT STL QL IA: NEGATIVE
RETICS # AUTO: 0.06 10*6/MM3 (ref 0.02–0.13)
RETICS/RBC NFR AUTO: 1.8 % (ref 0.7–1.9)
VIT B12 BLD-MCNC: 458 PG/ML (ref 211–946)

## 2024-08-05 PROCEDURE — 82784 ASSAY IGA/IGD/IGG/IGM EACH: CPT

## 2024-08-05 PROCEDURE — 82607 VITAMIN B-12: CPT

## 2024-08-05 PROCEDURE — 82746 ASSAY OF FOLIC ACID SERUM: CPT

## 2024-08-05 PROCEDURE — 86364 TISS TRNSGLTMNASE EA IG CLAS: CPT

## 2024-08-05 PROCEDURE — 85045 AUTOMATED RETICULOCYTE COUNT: CPT

## 2024-08-05 PROCEDURE — 36415 COLL VENOUS BLD VENIPUNCTURE: CPT

## 2024-08-05 PROCEDURE — 82274 ASSAY TEST FOR BLOOD FECAL: CPT

## 2024-08-05 PROCEDURE — 86258 DGP ANTIBODY EACH IG CLASS: CPT

## 2024-08-06 LAB
GLIADIN PEPTIDE IGA SER-ACNC: 4 UNITS (ref 0–19)
IGA SERPL-MCNC: 291 MG/DL (ref 64–422)
TTG IGA SER-ACNC: <2 U/ML (ref 0–3)

## 2024-08-12 ENCOUNTER — TELEPHONE (OUTPATIENT)
Dept: GASTROENTEROLOGY | Facility: CLINIC | Age: 84
End: 2024-08-12
Payer: MEDICARE

## 2024-08-12 NOTE — TELEPHONE ENCOUNTER
Patient returned MA's call,   Went over results with patient. Patient gave verbal understanding and wanted to cancel the follow up.     Follow up cancelled.

## 2024-08-12 NOTE — TELEPHONE ENCOUNTER
----- Message from April C sent at 8/8/2024  6:56 AM EDT -----    ----- Message -----  From: Chelle Barclay APRN  Sent: 8/7/2024   5:15 PM EDT  To: [facility]    Please notify patient all her test have come back negative, I have reviewed with Dr. Damon and he does not feel she needs scopes at this time as there is no evidence of blood loss or iron deficiency anemia.  She may cancel her follow-up appointment with us and return if she has any GI symptoms.

## 2024-09-17 ENCOUNTER — HOSPITAL ENCOUNTER (OUTPATIENT)
Dept: MAMMOGRAPHY | Facility: HOSPITAL | Age: 84
Discharge: HOME | End: 2024-09-17
Payer: MEDICARE

## 2024-09-17 ENCOUNTER — HOSPITAL ENCOUNTER (OUTPATIENT)
Dept: BONE DENSITY | Facility: HOSPITAL | Age: 84
Discharge: HOME | End: 2024-09-17
Payer: MEDICARE

## 2024-09-17 DIAGNOSIS — Z12.31 ENCOUNTER FOR SCREENING MAMMOGRAM FOR MALIGNANT NEOPLASM OF BREAST: ICD-10-CM

## 2024-09-17 DIAGNOSIS — Z90.11 H/O RIGHT MASTECTOMY: ICD-10-CM

## 2024-09-17 DIAGNOSIS — M85.80 OSTEOPENIA, UNSPECIFIED LOCATION: ICD-10-CM

## 2024-09-17 DIAGNOSIS — Z78.0 POSTMENOPAUSAL: ICD-10-CM

## 2024-09-17 DIAGNOSIS — C50.919 MALIGNANT NEOPLASM OF FEMALE BREAST, UNSPECIFIED ESTROGEN RECEPTOR STATUS, UNSPECIFIED LATERALITY, UNSPECIFIED SITE OF BREAST: ICD-10-CM

## 2024-09-17 DIAGNOSIS — D64.9 ANEMIA, UNSPECIFIED TYPE: ICD-10-CM

## 2024-09-17 PROCEDURE — 77080 DXA BONE DENSITY AXIAL: CPT

## 2024-09-17 PROCEDURE — 77067 SCR MAMMO BI INCL CAD: CPT

## 2024-09-17 PROCEDURE — 77063 BREAST TOMOSYNTHESIS BI: CPT

## 2024-09-20 ENCOUNTER — LAB (OUTPATIENT)
Dept: ONCOLOGY | Facility: HOSPITAL | Age: 84
End: 2024-09-20
Payer: MEDICARE

## 2024-09-20 ENCOUNTER — OFFICE VISIT (OUTPATIENT)
Dept: ONCOLOGY | Facility: HOSPITAL | Age: 84
End: 2024-09-20
Payer: MEDICARE

## 2024-09-20 VITALS
WEIGHT: 119.8 LBS | OXYGEN SATURATION: 99 % | HEIGHT: 60 IN | TEMPERATURE: 99.1 F | RESPIRATION RATE: 16 BRPM | SYSTOLIC BLOOD PRESSURE: 157 MMHG | DIASTOLIC BLOOD PRESSURE: 60 MMHG | HEART RATE: 70 BPM | BODY MASS INDEX: 23.52 KG/M2

## 2024-09-20 DIAGNOSIS — C50.919 MALIGNANT NEOPLASM OF FEMALE BREAST, UNSPECIFIED ESTROGEN RECEPTOR STATUS, UNSPECIFIED LATERALITY, UNSPECIFIED SITE OF BREAST: Primary | ICD-10-CM

## 2024-09-20 DIAGNOSIS — C50.919 MALIGNANT NEOPLASM OF FEMALE BREAST, UNSPECIFIED ESTROGEN RECEPTOR STATUS, UNSPECIFIED LATERALITY, UNSPECIFIED SITE OF BREAST: ICD-10-CM

## 2024-09-20 LAB
ALBUMIN SERPL-MCNC: 4 G/DL (ref 3.5–5.2)
ALBUMIN/GLOB SERPL: 1.3 G/DL
ALP SERPL-CCNC: 82 U/L (ref 39–117)
ALT SERPL W P-5'-P-CCNC: 13 U/L (ref 1–33)
ANION GAP SERPL CALCULATED.3IONS-SCNC: 5.4 MMOL/L (ref 5–15)
AST SERPL-CCNC: 21 U/L (ref 1–32)
BASOPHILS # BLD AUTO: 0.02 10*3/MM3 (ref 0–0.2)
BASOPHILS NFR BLD AUTO: 0.3 % (ref 0–1.5)
BILIRUB SERPL-MCNC: 0.6 MG/DL (ref 0–1.2)
BUN SERPL-MCNC: 21 MG/DL (ref 8–23)
BUN/CREAT SERPL: 20.8 (ref 7–25)
CALCIUM SPEC-SCNC: 9.1 MG/DL (ref 8.6–10.5)
CHLORIDE SERPL-SCNC: 100 MMOL/L (ref 98–107)
CO2 SERPL-SCNC: 29.6 MMOL/L (ref 22–29)
CREAT SERPL-MCNC: 1.01 MG/DL (ref 0.57–1)
DEPRECATED RDW RBC AUTO: 48.4 FL (ref 37–54)
EGFRCR SERPLBLD CKD-EPI 2021: 55.7 ML/MIN/1.73
EOSINOPHIL # BLD AUTO: 0.19 10*3/MM3 (ref 0–0.4)
EOSINOPHIL NFR BLD AUTO: 2.7 % (ref 0.3–6.2)
ERYTHROCYTE [DISTWIDTH] IN BLOOD BY AUTOMATED COUNT: 12.9 % (ref 12.3–15.4)
FERRITIN SERPL-MCNC: 148.5 NG/ML (ref 13–150)
GLOBULIN UR ELPH-MCNC: 3 GM/DL
GLUCOSE SERPL-MCNC: 76 MG/DL (ref 65–99)
HCT VFR BLD AUTO: 33.2 % (ref 34–46.6)
HGB BLD-MCNC: 10.7 G/DL (ref 12–15.9)
IMM GRANULOCYTES # BLD AUTO: 0.01 10*3/MM3 (ref 0–0.05)
IMM GRANULOCYTES NFR BLD AUTO: 0.1 % (ref 0–0.5)
IRON 24H UR-MRATE: 34 MCG/DL (ref 37–145)
IRON SATN MFR SERPL: 10 % (ref 20–50)
LYMPHOCYTES # BLD AUTO: 1.36 10*3/MM3 (ref 0.7–3.1)
LYMPHOCYTES NFR BLD AUTO: 19.6 % (ref 19.6–45.3)
MCH RBC QN AUTO: 32.7 PG (ref 26.6–33)
MCHC RBC AUTO-ENTMCNC: 32.2 G/DL (ref 31.5–35.7)
MCV RBC AUTO: 101.5 FL (ref 79–97)
MONOCYTES # BLD AUTO: 0.75 10*3/MM3 (ref 0.1–0.9)
MONOCYTES NFR BLD AUTO: 10.8 % (ref 5–12)
NEUTROPHILS NFR BLD AUTO: 4.6 10*3/MM3 (ref 1.7–7)
NEUTROPHILS NFR BLD AUTO: 66.5 % (ref 42.7–76)
PLATELET # BLD AUTO: 209 10*3/MM3 (ref 140–450)
PMV BLD AUTO: 10.6 FL (ref 6–12)
POTASSIUM SERPL-SCNC: 4.2 MMOL/L (ref 3.5–5.2)
PROT SERPL-MCNC: 7 G/DL (ref 6–8.5)
RBC # BLD AUTO: 3.27 10*6/MM3 (ref 3.77–5.28)
SODIUM SERPL-SCNC: 135 MMOL/L (ref 136–145)
TIBC SERPL-MCNC: 331 MCG/DL (ref 298–536)
TRANSFERRIN SERPL-MCNC: 222 MG/DL (ref 200–360)
WBC NRBC COR # BLD AUTO: 6.93 10*3/MM3 (ref 3.4–10.8)

## 2024-09-20 PROCEDURE — 80053 COMPREHEN METABOLIC PANEL: CPT

## 2024-09-20 PROCEDURE — 82728 ASSAY OF FERRITIN: CPT

## 2024-09-20 PROCEDURE — 83540 ASSAY OF IRON: CPT

## 2024-09-20 PROCEDURE — 84466 ASSAY OF TRANSFERRIN: CPT

## 2024-09-20 PROCEDURE — 85025 COMPLETE CBC W/AUTO DIFF WBC: CPT

## 2024-09-20 PROCEDURE — 36415 COLL VENOUS BLD VENIPUNCTURE: CPT

## 2024-09-20 PROCEDURE — G0463 HOSPITAL OUTPT CLINIC VISIT: HCPCS | Performed by: INTERNAL MEDICINE

## 2024-09-25 ENCOUNTER — TELEPHONE (OUTPATIENT)
Dept: ONCOLOGY | Facility: HOSPITAL | Age: 84
End: 2024-09-25
Payer: MEDICARE

## 2024-09-25 DIAGNOSIS — D50.0 IRON DEFICIENCY ANEMIA DUE TO CHRONIC BLOOD LOSS: Primary | ICD-10-CM

## 2024-09-25 RX ORDER — FERROUS GLUCONATE 324(38)MG
324 TABLET ORAL
Qty: 30 TABLET | Refills: 3 | Status: SHIPPED | OUTPATIENT
Start: 2024-09-25

## 2024-10-23 DIAGNOSIS — F41.9 ANXIETY AND DEPRESSION: ICD-10-CM

## 2024-10-23 DIAGNOSIS — I10 WHITE COAT SYNDROME WITH DIAGNOSIS OF HYPERTENSION: ICD-10-CM

## 2024-10-23 DIAGNOSIS — F32.A ANXIETY AND DEPRESSION: ICD-10-CM

## 2024-10-23 RX ORDER — PAROXETINE 20 MG/1
20 TABLET, FILM COATED ORAL NIGHTLY
Qty: 90 TABLET | Refills: 0 | Status: SHIPPED | OUTPATIENT
Start: 2024-10-23

## 2024-10-23 RX ORDER — LISINOPRIL 20 MG/1
20 TABLET ORAL DAILY
Qty: 90 TABLET | Refills: 0 | Status: SHIPPED | OUTPATIENT
Start: 2024-10-23

## 2024-10-29 DIAGNOSIS — K21.9 GASTROESOPHAGEAL REFLUX DISEASE, UNSPECIFIED WHETHER ESOPHAGITIS PRESENT: ICD-10-CM

## 2024-10-30 RX ORDER — OMEPRAZOLE 40 MG/1
40 CAPSULE, DELAYED RELEASE ORAL DAILY
Qty: 90 CAPSULE | Refills: 1 | Status: SHIPPED | OUTPATIENT
Start: 2024-10-30

## 2024-11-01 ENCOUNTER — OFFICE VISIT (OUTPATIENT)
Dept: FAMILY MEDICINE CLINIC | Facility: CLINIC | Age: 84
End: 2024-11-01
Payer: MEDICARE

## 2024-11-01 VITALS
WEIGHT: 120 LBS | OXYGEN SATURATION: 100 % | HEIGHT: 60 IN | DIASTOLIC BLOOD PRESSURE: 70 MMHG | SYSTOLIC BLOOD PRESSURE: 148 MMHG | BODY MASS INDEX: 23.56 KG/M2 | TEMPERATURE: 97.2 F | HEART RATE: 68 BPM

## 2024-11-01 DIAGNOSIS — D50.0 IRON DEFICIENCY ANEMIA DUE TO CHRONIC BLOOD LOSS: ICD-10-CM

## 2024-11-01 DIAGNOSIS — M81.0 AGE-RELATED OSTEOPOROSIS WITHOUT CURRENT PATHOLOGICAL FRACTURE: ICD-10-CM

## 2024-11-01 DIAGNOSIS — I10 WHITE COAT SYNDROME WITH DIAGNOSIS OF HYPERTENSION: Primary | ICD-10-CM

## 2024-11-01 DIAGNOSIS — E78.5 DYSLIPIDEMIA: ICD-10-CM

## 2024-11-01 DIAGNOSIS — F41.9 ANXIETY AND DEPRESSION: ICD-10-CM

## 2024-11-01 DIAGNOSIS — F32.A ANXIETY AND DEPRESSION: ICD-10-CM

## 2024-11-01 DIAGNOSIS — K21.9 GASTROESOPHAGEAL REFLUX DISEASE, UNSPECIFIED WHETHER ESOPHAGITIS PRESENT: ICD-10-CM

## 2024-11-01 LAB
25(OH)D3 SERPL-MCNC: 88.7 NG/ML (ref 30–100)
ALBUMIN SERPL-MCNC: 4.4 G/DL (ref 3.5–5.2)
ALBUMIN UR-MCNC: 2.3 MG/DL
ALBUMIN/GLOB SERPL: 1.3 G/DL
ALP SERPL-CCNC: 94 U/L (ref 39–117)
ALT SERPL W P-5'-P-CCNC: 12 U/L (ref 1–33)
ANION GAP SERPL CALCULATED.3IONS-SCNC: 9 MMOL/L (ref 5–15)
AST SERPL-CCNC: 23 U/L (ref 1–32)
BILIRUB SERPL-MCNC: 0.6 MG/DL (ref 0–1.2)
BUN SERPL-MCNC: 18 MG/DL (ref 8–23)
BUN/CREAT SERPL: 17.1 (ref 7–25)
CALCIUM SPEC-SCNC: 9.7 MG/DL (ref 8.6–10.5)
CHLORIDE SERPL-SCNC: 94 MMOL/L (ref 98–107)
CHOLEST SERPL-MCNC: 143 MG/DL (ref 0–200)
CO2 SERPL-SCNC: 28 MMOL/L (ref 22–29)
CREAT SERPL-MCNC: 1.05 MG/DL (ref 0.57–1)
CREAT UR-MCNC: 86.4 MG/DL
EGFRCR SERPLBLD CKD-EPI 2021: 53.2 ML/MIN/1.73
GLOBULIN UR ELPH-MCNC: 3.3 GM/DL
GLUCOSE SERPL-MCNC: 98 MG/DL (ref 65–99)
HDLC SERPL-MCNC: 55 MG/DL (ref 40–60)
LDLC SERPL CALC-MCNC: 69 MG/DL (ref 0–100)
LDLC/HDLC SERPL: 1.21 {RATIO}
MAGNESIUM SERPL-MCNC: 2.3 MG/DL (ref 1.6–2.4)
MICROALBUMIN/CREAT UR: 26.6 MG/G (ref 0–29)
PHOSPHATE SERPL-MCNC: 3.4 MG/DL (ref 2.5–4.5)
POTASSIUM SERPL-SCNC: 4.7 MMOL/L (ref 3.5–5.2)
PROT SERPL-MCNC: 7.7 G/DL (ref 6–8.5)
SODIUM SERPL-SCNC: 131 MMOL/L (ref 136–145)
T4 FREE SERPL-MCNC: 0.98 NG/DL (ref 0.92–1.68)
TRIGL SERPL-MCNC: 106 MG/DL (ref 0–150)
TSH SERPL DL<=0.05 MIU/L-ACNC: 3.94 UIU/ML (ref 0.27–4.2)
VLDLC SERPL-MCNC: 19 MG/DL (ref 5–40)

## 2024-11-01 PROCEDURE — 80061 LIPID PANEL: CPT | Performed by: NURSE PRACTITIONER

## 2024-11-01 PROCEDURE — 84439 ASSAY OF FREE THYROXINE: CPT | Performed by: NURSE PRACTITIONER

## 2024-11-01 PROCEDURE — 80053 COMPREHEN METABOLIC PANEL: CPT | Performed by: NURSE PRACTITIONER

## 2024-11-01 PROCEDURE — 84443 ASSAY THYROID STIM HORMONE: CPT | Performed by: NURSE PRACTITIONER

## 2024-11-01 PROCEDURE — 82306 VITAMIN D 25 HYDROXY: CPT | Performed by: NURSE PRACTITIONER

## 2024-11-01 PROCEDURE — 84100 ASSAY OF PHOSPHORUS: CPT | Performed by: NURSE PRACTITIONER

## 2024-11-01 PROCEDURE — 82570 ASSAY OF URINE CREATININE: CPT | Performed by: NURSE PRACTITIONER

## 2024-11-01 PROCEDURE — 82043 UR ALBUMIN QUANTITATIVE: CPT | Performed by: NURSE PRACTITIONER

## 2024-11-01 PROCEDURE — 83735 ASSAY OF MAGNESIUM: CPT | Performed by: NURSE PRACTITIONER

## 2024-11-01 RX ORDER — LISINOPRIL 20 MG/1
20 TABLET ORAL DAILY
Qty: 90 TABLET | Refills: 0 | Status: SHIPPED | OUTPATIENT
Start: 2024-11-01

## 2024-11-01 RX ORDER — ATORVASTATIN CALCIUM 20 MG/1
20 TABLET, FILM COATED ORAL DAILY
Qty: 90 TABLET | Refills: 1 | Status: SHIPPED | OUTPATIENT
Start: 2024-11-01

## 2024-11-01 RX ORDER — PAROXETINE 20 MG/1
20 TABLET, FILM COATED ORAL NIGHTLY
Qty: 90 TABLET | Refills: 0 | Status: SHIPPED | OUTPATIENT
Start: 2024-11-01

## 2024-11-01 NOTE — PROGRESS NOTES
Chief Complaint  Hypertension (Follow up and refills), Anemia, and Heartburn    History of Present Illness  Cristiane Bishop is a 84 y.o. female who presents to [facility] with a past medical history of    Past Medical History:   Diagnosis Date    Allergic rhinitis     Breast cancer     RIGHT    GERD (gastroesophageal reflux disease)     HTN (hypertension)     Hypertension     Ingrown toenail     Mood disorder      And presents to the office today for 6-month follow-up appointment.  Her last routine appointment was for Medicare annual wellness visit at the end of April.    She is currently prescribed Paxil 20 mg daily for treatment of anxiety and depression.  At this time her symptoms are stable.  No homicidal ideations or suicidal ideations.  She denies any AVH.  She does state that her symptoms tend to be slightly worse during the winter months.  Time change occurs this weekend.    She is treated for dyslipidemia with a atorvastatin 20 mg.  Last lipid profile in April with LDL of 62.  No myalgia with use.    She has whitecoat syndrome with hypertension.  She does monitor her blood pressure at home and reports that it has been normotensive.  Slightly elevated today in keeping with whitecoat syndrome.  She is taking lisinopril 20 mg daily.  No chest pain, palpitations, headaches, dizziness, shortness of breath.    She did see gastroenterology secondary to the iron deficiency anemia.  From a GI standpoint they did not see any reason for the iron deficiency.  She is seeing hematology as well, Dr. Pemberton.  Dr. Pemberton did want to give her iron infusions but she declined.  She is currently taking ferrous gluconate once daily.  She will have follow-up labs in March of next year.  She denies any obvious signs or symptoms of blood loss.  No rectal bleeding or blood in the stool.  Denies melena.    She is taking omeprazole for GERD.  She currently denies dysphagia, nausea, vomiting, or  "abdominal pain.  She does have a history of hiatal hernia on EGD in 2022.    Recent DEXA showed osteoporosis with elevated FRAX score.  She is taking calcium plus vitamin D but does not wish to take osteoporosis specific medication at this time.  She used to take Prolia and states that she lost 2 teeth while taking Prolia.  She does feel that it was directly related.    Objective   Vital Signs:   Vitals:    11/01/24 1034   BP: 148/70   BP Location: Left arm   Pulse: 68   Temp: 97.2 °F (36.2 °C)   TempSrc: Temporal   SpO2: 100%   Weight: 54.4 kg (120 lb)   Height: 152.4 cm (60\")     Body mass index is 23.44 kg/m².    Wt Readings from Last 3 Encounters:   11/01/24 54.4 kg (120 lb)   09/20/24 54.3 kg (119 lb 12.8 oz)   07/31/24 56.5 kg (124 lb 9.6 oz)     BP Readings from Last 3 Encounters:   11/01/24 148/70   09/20/24 157/60   07/31/24 143/51       Health Maintenance   Topic Date Due    RSV Vaccine - Adults (1 - 1-dose 75+ series) Never done    TDAP/TD VACCINES (3 - Td or Tdap) 09/15/2024    ANNUAL WELLNESS VISIT  04/30/2025    DXA SCAN  09/17/2026    COVID-19 Vaccine  Completed    INFLUENZA VACCINE  Completed    Pneumococcal Vaccine 65+  Completed    ZOSTER VACCINE  Completed       Physical Exam  Vitals reviewed.   Constitutional:       General: She is not in acute distress.     Appearance: Normal appearance. She is well-developed and normal weight. She is not ill-appearing.   HENT:      Head: Normocephalic and atraumatic.   Eyes:      General: No scleral icterus.        Right eye: No discharge.         Left eye: No discharge.      Extraocular Movements: Extraocular movements intact.      Conjunctiva/sclera: Conjunctivae normal.   Neck:      Thyroid: No thyromegaly.      Vascular: No carotid bruit.      Trachea: Trachea normal.   Cardiovascular:      Rate and Rhythm: Normal rate and regular rhythm.      Pulses: Normal pulses.      Heart sounds: No murmur heard.  Pulmonary:      Effort: Pulmonary effort is normal.     "  Breath sounds: Normal breath sounds. No wheezing, rhonchi or rales.   Musculoskeletal:         General: Normal range of motion.      Cervical back: Normal range of motion and neck supple. No tenderness.      Right lower leg: No edema.      Left lower leg: No edema.   Lymphadenopathy:      Cervical: No cervical adenopathy.   Skin:     General: Skin is warm and dry.   Neurological:      Mental Status: She is alert and oriented to person, place, and time.   Psychiatric:         Mood and Affect: Mood and affect normal.         Behavior: Behavior normal.         Thought Content: Thought content normal.         Judgment: Judgment normal.         Result Review :  The following data was reviewed by: ORLIN Denson on 11/01/2024:    Common labs          4/30/2024    09:59 7/22/2024    13:25 9/20/2024    12:19   Common Labs   Glucose 93   76    BUN 18   21    Creatinine 0.91   1.01    Sodium 133   135    Potassium 4.5   4.2    Chloride 98   100    Calcium 9.7   9.1    Albumin 4.2   4.0    Total Bilirubin 0.7   0.6    Alkaline Phosphatase 100   82    AST (SGOT) 27   21    ALT (SGPT) 13   13    WBC 6.23  4.72  6.93    Hemoglobin 11.9  10.2  10.7    Hematocrit 37.4  31.6  33.2    Platelets 246  211  209    Total Cholesterol 127      Triglycerides 82      HDL Cholesterol 49      LDL Cholesterol  62      Microalbumin, Urine <1.2        UPPER GI ENDOSCOPY (01/17/2022 08:32)     DEXA Bone Density Axial    Result Date: 9/17/2024  Impression: Osteopenia - Based upon the FRAX score, this patient does meet criteria for initiation of pharmacological treatment recommendations for prevention of osteoporosis per the National Osteoporosis Foundation (NOF) guidelines. However, individualized treatment decisions should be made accounting for additional clinical factors. Report dictated by: Andreina Palm  I have personally reviewed this case and agree with the findings above: Electronically Signed: Parmjit Esqueda MD  9/17/2024  10:33 AM EDT  Workstation ID: [id number]    Mammo Screening Modified With Tomosynthesis Left With CAD    Result Date: 9/17/2024  No mammographic signs of malignancy. Recommend annual screening mammography  The patient's information is entered into a computerized reminder system with a targeted due date for the next mammogram.  TISSUE DENSITY: There are scattered areas of fibroglandular density.  BI-RADS ASSESSMENT: BI-RADS 1. Negative.     Note: It has been reported that there is approximately a 15% false negative rate in mammography. Therefore, management of a palpable abnormality should not be deferred because of a negative mammogram.           Electronically Signed By-Gaby Katz MD On:9/17/2024 10:18 AM        Procedures        Assessment and Plan   Diagnoses and all orders for this visit:    1. White coat syndrome with diagnosis of hypertension (Primary)  -     lisinopril (PRINIVIL,ZESTRIL) 20 MG tablet; Take 1 tablet by mouth Daily.  Dispense: 90 tablet; Refill: 0  -     Comprehensive Metabolic Panel  -     Lipid Panel  -     TSH+Free T4  -     Microalbumin / Creatinine Urine Ratio - Urine, Clean Catch    2. Dyslipidemia  -     atorvastatin (LIPITOR) 20 MG tablet; Take 1 tablet by mouth Daily.  Dispense: 90 tablet; Refill: 1  -     Comprehensive Metabolic Panel  -     Lipid Panel    3. Gastroesophageal reflux disease, unspecified whether esophagitis present    4. Anxiety and depression  -     PARoxetine (PAXIL) 20 MG tablet; Take 1 tablet by mouth Every Night.  Dispense: 90 tablet; Refill: 0    5. Iron deficiency anemia due to chronic blood loss  Overview:  Added automatically from request for surgery [id number]      6. Age-related osteoporosis without current pathological fracture  -     Magnesium  -     Phosphorus  -     Vitamin D,25-Hydroxy        BMI is within normal parameters. No other follow-up for BMI required.     FOLLOW UP  Return in about 6 months (around 5/1/2025) for Subsequent Medicare Wellness,  medication refills and fasting labs.    Continue lisinopril for hypertension.  Blood pressure monitoring at home is within normal limits.  She does have whitecoat syndrome.    Continue Lipitor for dyslipidemia.  Last LDL cholesterol 62.  Her goal is less than 100, but less than 70 is optimal.    Continue PPI therapy for GERD.  Symptoms currently stable.  Medication refill last week.    Continue Paxil for anxiety and depression.  Currently symptoms are stable, but she does state that she sometimes gets a little worse during the winter months.  She will follow-up with any changes.    She will take iron orally for her iron deficiency anemia.  Recheck labs in March per hematology.  I did encourage her to consider iron infusions if she is unable to be compliant with oral iron therapy, or if side effects occur such as constipation, or if not efficacious with repeat labs.    We will check magnesium phosphorus and vitamin D given findings of osteopenia/osteoporosis on her recent DEXA with elevated FRAX score.  She does not wish to take osteoporosis specific medication at this time due to previous history of taking into the loss.  Continue DEXA every 2 years.    Patient was given instructions and counseling regarding her condition or for health maintenance advice. Please see specific information pulled into the AVS if appropriate.       Azalea Abdullahi, APRN  11/01/24  11:55 EDT    CURRENT & DISCONTINUED MEDICATIONS  Current Outpatient Medications   Medication Instructions    aspirin 81 mg, Oral, Daily    atorvastatin (LIPITOR) 20 mg, Oral, Daily    Calcium Carb-Cholecalciferol (Calcium 600+D3) 600-200 MG-UNIT tablet 1 tablet, Daily    Docusate Calcium (STOOL SOFTENER PO) Take  by mouth.    ferrous gluconate (FERGON) 324 mg, Oral, Daily With Breakfast    lisinopril (PRINIVIL,ZESTRIL) 20 mg, Oral, Daily    loratadine (CLARITIN) 10 mg, Nightly    Magnesium 100 mg, Daily    Menaquinone-7 (VITAMIN K2 PO) Take  by mouth.     omeprazole (PRILOSEC) 40 mg, Oral, Daily    PARoxetine (PAXIL) 20 mg, Oral, Nightly       Medications Discontinued During This Encounter   Medication Reason    atorvastatin (LIPITOR) 20 MG tablet Reorder    lisinopril (PRINIVIL,ZESTRIL) 20 MG tablet Reorder    PARoxetine (PAXIL) 20 MG tablet Reorder

## 2024-11-01 NOTE — PROGRESS NOTES
..  Venipuncture Blood Specimen Collection  Venipuncture performed in LT arm by Tejal Blakely MA with good hemostasis. Patient tolerated the procedure well without complications.   11/01/24   Tejal Blakely MA

## 2024-12-09 DIAGNOSIS — E78.5 DYSLIPIDEMIA: ICD-10-CM

## 2024-12-11 ENCOUNTER — TELEPHONE (OUTPATIENT)
Dept: FAMILY MEDICINE CLINIC | Facility: CLINIC | Age: 84
End: 2024-12-11
Payer: MEDICARE

## 2024-12-11 NOTE — TELEPHONE ENCOUNTER
"  Caller: Cristiane Bishop \"GAMA\"    Relationship: Self    Best call back number: [phone]    What is the best time to reach you: ANYTIME    Who are you requesting to speak with (clinical staff, provider,  specific staff member): NURSE     What was the call regarding: PATIENT IS CALLING STATING THAT [facility] IN [place] IS GIVEN HER A HARD TIME ABOUT THE MEDICATION THAT WAS CALLED IN ON 11/1/24.  SHE ASK THAT SOMEONE GIVE THE PHARMACY A CALL AND GET IT WORKED OUT FOR HER.     "

## 2025-03-18 ENCOUNTER — LAB (OUTPATIENT)
Dept: LAB | Facility: HOSPITAL | Age: 85
End: 2025-03-18
Payer: MEDICARE

## 2025-03-18 DIAGNOSIS — D50.0 IRON DEFICIENCY ANEMIA DUE TO CHRONIC BLOOD LOSS: ICD-10-CM

## 2025-03-18 LAB
FERRITIN SERPL-MCNC: 200 NG/ML (ref 13–150)
IRON 24H UR-MRATE: 132 MCG/DL (ref 37–145)
IRON SATN MFR SERPL: 37 % (ref 20–50)
TIBC SERPL-MCNC: 353 MCG/DL (ref 298–536)
TRANSFERRIN SERPL-MCNC: 237 MG/DL (ref 200–360)

## 2025-03-18 PROCEDURE — 82728 ASSAY OF FERRITIN: CPT

## 2025-03-18 PROCEDURE — 83540 ASSAY OF IRON: CPT

## 2025-03-18 PROCEDURE — 36415 COLL VENOUS BLD VENIPUNCTURE: CPT

## 2025-03-18 PROCEDURE — 84466 ASSAY OF TRANSFERRIN: CPT

## 2025-03-21 ENCOUNTER — OFFICE VISIT (OUTPATIENT)
Dept: ONCOLOGY | Facility: HOSPITAL | Age: 85
End: 2025-03-21
Payer: MEDICARE

## 2025-03-21 VITALS
SYSTOLIC BLOOD PRESSURE: 153 MMHG | OXYGEN SATURATION: 99 % | BODY MASS INDEX: 24.85 KG/M2 | HEART RATE: 74 BPM | WEIGHT: 126.6 LBS | HEIGHT: 60 IN | DIASTOLIC BLOOD PRESSURE: 79 MMHG | RESPIRATION RATE: 16 BRPM | TEMPERATURE: 97.6 F

## 2025-03-21 DIAGNOSIS — D50.0 IRON DEFICIENCY ANEMIA DUE TO CHRONIC BLOOD LOSS: ICD-10-CM

## 2025-03-21 DIAGNOSIS — Z12.31 ENCOUNTER FOR SCREENING MAMMOGRAM FOR MALIGNANT NEOPLASM OF BREAST: Primary | ICD-10-CM

## 2025-03-21 PROCEDURE — G0463 HOSPITAL OUTPT CLINIC VISIT: HCPCS | Performed by: INTERNAL MEDICINE

## 2025-03-21 NOTE — PROGRESS NOTES
Chief Complaint/Care Team   Breast Cancer    Azalea Abdullahi, NUZHAT*  Azalea Abdullahi, APRN    History of Present Illness     Diagnosis: Right Breast Cancer, stage I, diagnosed 5/20/2015    Current Treatment: Active surveillance    Previous Treatment: See treatment history below    Cristiane Bishop is a 84 y.o. female who presents to [facility] for follow up regarding right breast cancer.    Ms. Cristiane Bishop presents for 1 year follow up for right breast cancer, stage I. Completed right mastectomy and completed 5 years of endocrine therapy in June of 2021.     She comes in today for continued surveillance given history breast cancer and follow up regarding iron deficiency anemia. She took Prolia injections in the past but has not had any for at least 2 years.  Most recent DEXA scan showed osteopenia meeting treatment criteria, but she is again not interested in receiving Prolia or other infusion of her bones through our office. She again reports she feels well overall. She again denies any breast concerns reported today.      Review of Systems   Constitutional:  Negative for appetite change, diaphoresis, fatigue, fever, unexpected weight gain and unexpected weight loss.   HENT:  Negative for hearing loss, mouth sores, sore throat, swollen glands, trouble swallowing and voice change.    Eyes:  Negative for blurred vision.   Respiratory:  Negative for cough, shortness of breath and wheezing.    Cardiovascular:  Negative for chest pain and palpitations.   Gastrointestinal:  Negative for abdominal pain, blood in stool, constipation, diarrhea, nausea and vomiting.   Endocrine: Negative for cold intolerance and heat intolerance.   Genitourinary:  Negative for difficulty urinating, dysuria, frequency, hematuria and urinary incontinence.   Musculoskeletal:  Negative for arthralgias, back pain and myalgias.   Skin:  Negative for rash, skin lesions and wound.  "  Neurological:  Negative for dizziness, seizures, weakness, numbness and headache.   Hematological:  Does not bruise/bleed easily.   Psychiatric/Behavioral:  Negative for depressed mood. The patient is not nervous/anxious.    All other systems reviewed and are negative.       Oncology/Hematology History Overview Note   HEME/ONC DX/RX/INVESTIGATIONS  DX:   R breast ca, stage I, ER/DE positive, HER2 negative, grade I (low risk).    Anemia undergoing medical evaluation. As of 6/18/2021, patient is anticipating GI consultation in the near future.     Hyperglobulinemia, hyponatremia.     RX:   Femara, started in 6/2015. DC on 6/15/2021.  Prolia Q6m. On 6/18/20201, patient declined to continue Prolia.    INVESTIGATIONS:   5/28/2015, pathology report, right mastectomy, invasive ductal carcinoma, greatest dimension 7 mm, grade 1, 3 sentinel lymph nodes negative, ER/DE positive, HER-2 negative.    8/28/2020, L Mammogram, OTONIEL.    8/28/2020, DEXA, CONCLUSION: Normal bone density lumbar spine. Osteopenia in the hips.Bone density in the femoral necks has decreased by 1.9 percent compared to 2018.      Breast cancer in female   5/8/2015 Initial Diagnosis    Breast cancer in female (CMS/[facility])     6/18/2021 - 12/16/2021 Chemotherapy    OP SUPPORTIVE Denosumab (Prolia) Q6M     6/18/2021 Cancer Staged    Staging form: Breast, AJCC 8th Edition  - Clinical: Stage IA (cT1, cN0, cM0, G1, ER+, DE+, HER2-) - Signed by Tate Humphries MD on 6/18/2021     Breast cancer (Resolved)   6/17/2021 Initial Diagnosis    Breast cancer (CMS/HCC)     6/18/2021 Cancer Staged    Staging form: Breast, AJCC 8th Edition  - Clinical: Stage IA (cT1, cN0, cM0, G1, ER+, DE+, HER2-) - Signed by Tate Humphries MD on 6/18/2021         Objective     Vitals:    03/21/25 1147   BP: 153/79   Pulse: 74   Resp: 16   Temp: 97.6 °F (36.4 °C)   TempSrc: Temporal   SpO2: 99%   Weight: 57.4 kg (126 lb 9.6 oz)   Height: 152.4 cm (60\")   PainSc: 0-No pain         ECOG score: 0 "         PHQ-9 Total Score:         Physical Exam  Vitals reviewed. Exam conducted with a chaperone present.   Constitutional:       General: She is not in acute distress.     Appearance: Normal appearance.   HENT:      Head: Normocephalic and atraumatic.   Eyes:      Extraocular Movements: Extraocular movements intact.      Conjunctiva/sclera: Conjunctivae normal.   Pulmonary:      Effort: Pulmonary effort is normal.   Musculoskeletal:      Cervical back: Normal range of motion and neck supple.   Skin:     General: Skin is warm and dry.      Findings: No bruising.   Neurological:      Mental Status: She is oriented to person, place, and time.           Past Medical History     Past Medical History:   Diagnosis Date    Allergic rhinitis     Breast cancer     RIGHT    GERD (gastroesophageal reflux disease)     HTN (hypertension)     Hypertension     Ingrown toenail     Mood disorder      Current Outpatient Medications on File Prior to Visit   Medication Sig Dispense Refill    aspirin 81 MG EC tablet Take 1 tablet by mouth Daily. 90 tablet 3    atorvastatin (LIPITOR) 20 MG tablet Take 1 tablet by mouth Daily. 90 tablet 1    Calcium Carb-Cholecalciferol (Calcium 600+D3) 600-200 MG-UNIT tablet Take 1 tablet by mouth Daily. Last dose 1 month ago      Docusate Calcium (STOOL SOFTENER PO) Take  by mouth.      ELDERBERRY PO Take  by mouth.      ferrous gluconate (FERGON) 324 MG tablet Take 1 tablet by mouth Daily With Breakfast. 30 tablet 3    lisinopril (PRINIVIL,ZESTRIL) 20 MG tablet Take 1 tablet by mouth Daily. 90 tablet 0    loratadine (CLARITIN) 10 MG tablet Take 1 tablet by mouth Every Night.      Magnesium 100 MG tablet Take 100 mg by mouth Daily.      Menaquinone-7 (VITAMIN K2 PO) Take  by mouth.      omeprazole (priLOSEC) 40 MG capsule TAKE 1 CAPSULE BY MOUTH DAILY FOR GERD 90 capsule 1    PARoxetine (PAXIL) 20 MG tablet Take 1 tablet by mouth Every Night. 90 tablet 0     No current facility-administered  medications on file prior to visit.      Allergies   Allergen Reactions    Tetracycline Nausea Only and Rash     Difficulty swallowing      Penicillins Rash    Sulfa Antibiotics Rash     Past Surgical History:   Procedure Laterality Date    ABDOMINAL HYSTERECTOMY      APPENDECTOMY      BREAST BIOPSY Right     COLONOSCOPY N/A 8/30/2021    Procedure: COLONOSCOPY;  Surgeon: Norman Damon MD;  Location: [facility] ENDOSCOPY;  Service: Gastroenterology;  Laterality: N/A;  NORMAL COLON    ENDOSCOPY N/A 8/30/2021    Procedure: ESOPHAGOGASTRODUODENOSCOPY WITH BIOPSY;  Surgeon: Norman Damon MD;  Location: [facility] ENDOSCOPY;  Service: Gastroenterology;  Laterality: N/A;  GASTRITIS/HIATAL HERNIA    ENDOSCOPY N/A 9/27/2021    Procedure: ESOPHAGOGASTRODUODENOSCOPY with biopsies;  Surgeon: Norman Damon MD;  Location: [facility] ENDOSCOPY;  Service: Gastroenterology;  Laterality: N/A;  GASTRITIS, HIATAL HERNIA    ENDOSCOPY N/A 1/17/2022    Procedure: ESOPHAGOGASTRODUODENOSCOPY WITH BX;  Surgeon: Norman Damon MD;  Location: [facility] ENDOSCOPY;  Service: Gastroenterology;  Laterality: N/A;  HIATAL HERNIA, REFULX ESOPHAGITIS    HYSTERECTOMY      MASTECTOMY      MASTECTOMY Right 05/2015     Social History     Socioeconomic History    Marital status:    Tobacco Use    Smoking status: Never    Smokeless tobacco: Never   Vaping Use    Vaping status: Never Used   Substance and Sexual Activity    Alcohol use: Never    Drug use: Never    Sexual activity: Defer     Family History   Problem Relation Age of Onset    Lymphoma Mother     Breast cancer Mother     Cancer Mother     Lymphoma Maternal Uncle     Diabetes Maternal Uncle     Heart disease Other     Diabetes Paternal Uncle     Lymphoma Other     Stroke Other     Colon cancer Neg Hx     Malig Hyperthermia Neg Hx        Results     Result Review   The following data was reviewed by: Rafita Pemberton MD on 09/21/2023:  Lab Results   Component Value Date    HGB 10.7  (L) 09/20/2024    HCT 33.2 (L) 09/20/2024    .5 (H) 09/20/2024     09/20/2024    WBC 6.93 09/20/2024    NEUTROABS 4.60 09/20/2024    LYMPHSABS 1.36 09/20/2024    MONOSABS 0.75 09/20/2024    EOSABS 0.19 09/20/2024    BASOSABS 0.02 09/20/2024     Lab Results   Component Value Date    GLUCOSE 98 11/01/2024    BUN 18 11/01/2024    CREATININE 1.05 (H) 11/01/2024     (L) 11/01/2024    K 4.7 11/01/2024    CL 94 (L) 11/01/2024    CO2 28.0 11/01/2024    CALCIUM 9.7 11/01/2024    PROTEINTOT 7.7 11/01/2024    ALBUMIN 4.4 11/01/2024    BILITOT 0.6 11/01/2024    ALKPHOS 94 11/01/2024    AST 23 11/01/2024    ALT 12 11/01/2024     Lab Results   Component Value Date    MG 2.3 11/01/2024    PHOS 3.4 11/01/2024    FREET4 0.98 11/01/2024    TSH 3.940 11/01/2024           No radiology results for the last day       Assessment & Plan     Diagnoses and all orders for this visit:    1. Encounter for screening mammogram for malignant neoplasm of breast (Primary)  -     Mammo screening digital tomosynthesis bilateral w CAD; Future    2. Iron deficiency anemia due to chronic blood loss  -     CBC & Differential; Future  -     Comprehensive Metabolic Panel; Future  -     Ferritin; Future  -     Iron Profile; Future            Cristiane Bishop is a 84 y.o. female who presents to [facility] for Right Breast Cancer, stage I, diagnosed 5/20/2015, status post right mastectomy,completed 5 years of endocrine therapy in June of 2021.  She is now under active surveillance.    -Most recent DEXA scan from 9/17/2024-reveal osteopenia, based on FRAX score patient does meet criteria for pharmacological treatment per National osteoporosis foundation, today I again discussed option of with Prolia or other similar medication but patient preferred to not receive any treatment at this time, pt understands that without treatment she could progress to osteoporosis, recommend continued calcium and Vit  D supplementation.    -Recommend patient continue with annual mammogram, mammogram from 9/17/2024 was BI-RADS 1 negative, due in 9/2025.    Anemia  -ordered recheck of Iron studies, which showed normalization of iron profile, indicating treatment response, recommend she continue over the counter oral iron tablet once daily   -FOBT from 8/2024 was negative, no report of abnormal blood loss or melena today  -TSH/FT4, B12 and folate normal from 8/5/2024    Plan patient follow-up in 6 months with CBC, CMP, Iron studies with follow-up with me.    Please note that portions of this note were completed with a voice recognition program.    Electronically signed by Rafita Pemberton MD, 03/21/25, 12:50 PM EDT.      Follow Up     I spent 30 minutes caring for Cristiane on this date of service. This time includes time spent by me in the following activities:preparing for the visit, reviewing tests, obtaining and/or reviewing a separately obtained history, performing a medically appropriate examination and/or evaluation , counseling and educating the patient/family/caregiver, ordering medications, tests, or procedures, referring and communicating with other health care professionals , documenting information in the medical record, independently interpreting results and communicating that information with the patient/family/caregiver, and care coordination.    This is an acute or chronic illness that poses a threat to life or bodily function. The above treatment plan involves a high risk of complications and/or mortality of patient management.    The patient was seen and examined. Work by the provider also included review and/or ordering of lab tests, review and/or ordering of radiology tests, review and/or ordering of medicine tests, discussion with other physicians or providers, independent review of data, obtaining old records, review/summation of old records, and/or other review.    I have reviewed the family history, social  history, and past medical history for this patient. Previous information and data has been reviewed and updated as needed. I have reviewed and verified the chief complaint, history, and other documentation. The patient was interviewed and examined in the clinic and the chart reviewed. The previous observations, recommendations, and conclusions were reviewed including those of other providers.     The plan was discussed with the patient and/or family. The patient was given time to ask questions and these questions were answered. At the conclusion of their visit they had no additional questions or concerns and all questions were answered to their satisfaction.    Patient was given instructions and counseling regarding her condition or for health maintenance advice. Please see specific information pulled into the AVS if appropriate.

## 2025-04-08 DIAGNOSIS — K21.9 GASTROESOPHAGEAL REFLUX DISEASE, UNSPECIFIED WHETHER ESOPHAGITIS PRESENT: ICD-10-CM

## 2025-04-08 RX ORDER — OMEPRAZOLE 40 MG/1
40 CAPSULE, DELAYED RELEASE ORAL DAILY
Qty: 30 CAPSULE | Refills: 0 | Status: SHIPPED | OUTPATIENT
Start: 2025-04-08

## 2025-04-17 DIAGNOSIS — F41.9 ANXIETY AND DEPRESSION: ICD-10-CM

## 2025-04-17 DIAGNOSIS — F32.A ANXIETY AND DEPRESSION: ICD-10-CM

## 2025-04-17 RX ORDER — PAROXETINE 20 MG/1
20 TABLET, FILM COATED ORAL NIGHTLY
Qty: 90 TABLET | Refills: 0 | Status: SHIPPED | OUTPATIENT
Start: 2025-04-17

## 2025-04-18 DIAGNOSIS — F41.9 ANXIETY AND DEPRESSION: ICD-10-CM

## 2025-04-18 DIAGNOSIS — F32.A ANXIETY AND DEPRESSION: ICD-10-CM

## 2025-04-18 DIAGNOSIS — I10 WHITE COAT SYNDROME WITH DIAGNOSIS OF HYPERTENSION: ICD-10-CM

## 2025-04-18 RX ORDER — PAROXETINE 20 MG/1
20 TABLET, FILM COATED ORAL NIGHTLY
Qty: 90 TABLET | Refills: 0 | OUTPATIENT
Start: 2025-04-18

## 2025-04-18 RX ORDER — LISINOPRIL 20 MG/1
20 TABLET ORAL DAILY
Qty: 90 TABLET | Refills: 0 | Status: SHIPPED | OUTPATIENT
Start: 2025-04-18

## 2025-05-02 ENCOUNTER — OFFICE VISIT (OUTPATIENT)
Dept: FAMILY MEDICINE CLINIC | Facility: CLINIC | Age: 85
End: 2025-05-02
Payer: MEDICARE

## 2025-05-02 VITALS
SYSTOLIC BLOOD PRESSURE: 130 MMHG | HEIGHT: 58 IN | TEMPERATURE: 97.7 F | DIASTOLIC BLOOD PRESSURE: 78 MMHG | BODY MASS INDEX: 26.24 KG/M2 | WEIGHT: 125 LBS | OXYGEN SATURATION: 99 % | HEART RATE: 74 BPM

## 2025-05-02 DIAGNOSIS — E78.5 DYSLIPIDEMIA: ICD-10-CM

## 2025-05-02 DIAGNOSIS — K21.9 GASTROESOPHAGEAL REFLUX DISEASE, UNSPECIFIED WHETHER ESOPHAGITIS PRESENT: ICD-10-CM

## 2025-05-02 DIAGNOSIS — Z91.81 AT MODERATE RISK FOR FALL: ICD-10-CM

## 2025-05-02 DIAGNOSIS — F41.9 ANXIETY AND DEPRESSION: ICD-10-CM

## 2025-05-02 DIAGNOSIS — I10 WHITE COAT SYNDROME WITH DIAGNOSIS OF HYPERTENSION: ICD-10-CM

## 2025-05-02 DIAGNOSIS — Z00.00 MEDICARE ANNUAL WELLNESS VISIT, SUBSEQUENT: Primary | ICD-10-CM

## 2025-05-02 DIAGNOSIS — F32.A ANXIETY AND DEPRESSION: ICD-10-CM

## 2025-05-02 DIAGNOSIS — I10 ESSENTIAL HYPERTENSION: ICD-10-CM

## 2025-05-02 LAB
ALBUMIN SERPL-MCNC: 4.4 G/DL (ref 3.5–5.2)
ALBUMIN UR-MCNC: <1.2 MG/DL
ALBUMIN/GLOB SERPL: 1.3 G/DL
ALP SERPL-CCNC: 89 U/L (ref 39–117)
ALT SERPL W P-5'-P-CCNC: 15 U/L (ref 1–33)
ANION GAP SERPL CALCULATED.3IONS-SCNC: 9.5 MMOL/L (ref 5–15)
AST SERPL-CCNC: 29 U/L (ref 1–32)
BASOPHILS # BLD AUTO: 0.03 10*3/MM3 (ref 0–0.2)
BASOPHILS NFR BLD AUTO: 0.4 % (ref 0–1.5)
BILIRUB SERPL-MCNC: 1 MG/DL (ref 0–1.2)
BUN SERPL-MCNC: 21 MG/DL (ref 8–23)
BUN/CREAT SERPL: 20.2 (ref 7–25)
CALCIUM SPEC-SCNC: 9.8 MG/DL (ref 8.6–10.5)
CHLORIDE SERPL-SCNC: 99 MMOL/L (ref 98–107)
CHOLEST SERPL-MCNC: 118 MG/DL (ref 0–200)
CO2 SERPL-SCNC: 26.5 MMOL/L (ref 22–29)
CREAT SERPL-MCNC: 1.04 MG/DL (ref 0.57–1)
CREAT UR-MCNC: 61.9 MG/DL
DEPRECATED RDW RBC AUTO: 43 FL (ref 37–54)
EGFRCR SERPLBLD CKD-EPI 2021: 53.8 ML/MIN/1.73
EOSINOPHIL # BLD AUTO: 0.5 10*3/MM3 (ref 0–0.4)
EOSINOPHIL NFR BLD AUTO: 6.2 % (ref 0.3–6.2)
ERYTHROCYTE [DISTWIDTH] IN BLOOD BY AUTOMATED COUNT: 11.9 % (ref 12.3–15.4)
GLOBULIN UR ELPH-MCNC: 3.4 GM/DL
GLUCOSE SERPL-MCNC: 110 MG/DL (ref 65–99)
HCT VFR BLD AUTO: 34.2 % (ref 34–46.6)
HDLC SERPL-MCNC: 53 MG/DL (ref 40–60)
HGB BLD-MCNC: 11.5 G/DL (ref 12–15.9)
IMM GRANULOCYTES # BLD AUTO: 0.01 10*3/MM3 (ref 0–0.05)
IMM GRANULOCYTES NFR BLD AUTO: 0.1 % (ref 0–0.5)
LDLC SERPL CALC-MCNC: 49 MG/DL (ref 0–100)
LDLC/HDLC SERPL: 0.94 {RATIO}
LYMPHOCYTES # BLD AUTO: 2.06 10*3/MM3 (ref 0.7–3.1)
LYMPHOCYTES NFR BLD AUTO: 25.4 % (ref 19.6–45.3)
MCH RBC QN AUTO: 33.5 PG (ref 26.6–33)
MCHC RBC AUTO-ENTMCNC: 33.6 G/DL (ref 31.5–35.7)
MCV RBC AUTO: 99.7 FL (ref 79–97)
MICROALBUMIN/CREAT UR: NORMAL MG/G{CREAT}
MONOCYTES # BLD AUTO: 0.84 10*3/MM3 (ref 0.1–0.9)
MONOCYTES NFR BLD AUTO: 10.4 % (ref 5–12)
NEUTROPHILS NFR BLD AUTO: 4.67 10*3/MM3 (ref 1.7–7)
NEUTROPHILS NFR BLD AUTO: 57.5 % (ref 42.7–76)
NRBC BLD AUTO-RTO: 0 /100 WBC (ref 0–0.2)
PLATELET # BLD AUTO: 241 10*3/MM3 (ref 140–450)
PMV BLD AUTO: 10.7 FL (ref 6–12)
POTASSIUM SERPL-SCNC: 4.4 MMOL/L (ref 3.5–5.2)
PROT SERPL-MCNC: 7.8 G/DL (ref 6–8.5)
RBC # BLD AUTO: 3.43 10*6/MM3 (ref 3.77–5.28)
SODIUM SERPL-SCNC: 135 MMOL/L (ref 136–145)
T4 FREE SERPL-MCNC: 0.95 NG/DL (ref 0.92–1.68)
TRIGL SERPL-MCNC: 77 MG/DL (ref 0–150)
TSH SERPL DL<=0.05 MIU/L-ACNC: 3.55 UIU/ML (ref 0.27–4.2)
VLDLC SERPL-MCNC: 16 MG/DL (ref 5–40)
WBC NRBC COR # BLD AUTO: 8.11 10*3/MM3 (ref 3.4–10.8)

## 2025-05-02 PROCEDURE — 82043 UR ALBUMIN QUANTITATIVE: CPT | Performed by: NURSE PRACTITIONER

## 2025-05-02 PROCEDURE — 84439 ASSAY OF FREE THYROXINE: CPT | Performed by: NURSE PRACTITIONER

## 2025-05-02 PROCEDURE — 84443 ASSAY THYROID STIM HORMONE: CPT | Performed by: NURSE PRACTITIONER

## 2025-05-02 PROCEDURE — 80061 LIPID PANEL: CPT | Performed by: NURSE PRACTITIONER

## 2025-05-02 PROCEDURE — 80053 COMPREHEN METABOLIC PANEL: CPT | Performed by: NURSE PRACTITIONER

## 2025-05-02 PROCEDURE — 82570 ASSAY OF URINE CREATININE: CPT | Performed by: NURSE PRACTITIONER

## 2025-05-02 PROCEDURE — 85025 COMPLETE CBC W/AUTO DIFF WBC: CPT | Performed by: NURSE PRACTITIONER

## 2025-05-02 RX ORDER — LISINOPRIL 20 MG/1
20 TABLET ORAL DAILY
Qty: 90 TABLET | Refills: 1 | Status: SHIPPED | OUTPATIENT
Start: 2025-05-02

## 2025-05-02 RX ORDER — PAROXETINE 20 MG/1
20 TABLET, FILM COATED ORAL NIGHTLY
Qty: 90 TABLET | Refills: 1 | Status: SHIPPED | OUTPATIENT
Start: 2025-05-02

## 2025-05-02 RX ORDER — ATORVASTATIN CALCIUM 20 MG/1
20 TABLET, FILM COATED ORAL DAILY
Qty: 90 TABLET | Refills: 1 | OUTPATIENT
Start: 2025-05-02

## 2025-05-02 RX ORDER — ASPIRIN 81 MG/1
81 TABLET ORAL DAILY
Start: 2025-05-02

## 2025-05-02 RX ORDER — OMEPRAZOLE 40 MG/1
40 CAPSULE, DELAYED RELEASE ORAL DAILY
Qty: 90 CAPSULE | Refills: 1 | Status: SHIPPED | OUTPATIENT
Start: 2025-05-02

## 2025-05-02 RX ORDER — ATORVASTATIN CALCIUM 20 MG/1
20 TABLET, FILM COATED ORAL DAILY
Qty: 90 TABLET | Refills: 1 | Status: SHIPPED | OUTPATIENT
Start: 2025-05-02

## 2025-05-02 RX ORDER — OMEPRAZOLE 40 MG/1
40 CAPSULE, DELAYED RELEASE ORAL DAILY
Qty: 90 CAPSULE | OUTPATIENT
Start: 2025-05-02

## 2025-05-02 NOTE — PATIENT INSTRUCTIONS
Fall Prevention in the Home, Adult  Falls can cause injuries and affect people of all ages. There are many simple things that you can do to make your home safe and to help prevent falls.  If you need it, ask for help making these changes.  What actions can I take to prevent falls?  General information  Use good lighting in all rooms. Make sure to:  Replace any light bulbs that burn out.  Turn on lights if it is dark and use night-lights.  Keep items that you use often in easy-to-reach places. Lower the shelves around your home if needed.  Move furniture so that there are clear paths around it.  Do not keep throw rugs or other things on the floor that can make you trip.  If any of your floors are uneven, fix them.  Add color or contrast paint or tape to clearly jackson and help you see:  Grab bars or handrails.  First and last steps of staircases.  Where the edge of each step is.  If you use a ladder or stepladder:  Make sure that it is fully opened. Do not climb a closed ladder.  Make sure the sides of the ladder are locked in place.  Have someone hold the ladder while you use it.  Know where your pets are as you move through your home.  What can I do in the bathroom?         Keep the floor dry. Clean up any water that is on the floor right away.  Remove soap buildup in the bathtub or shower. Buildup makes bathtubs and showers slippery.  Use non-skid mats or decals on the floor of the bathtub or shower.  Attach bath mats securely with double-sided, non-slip rug tape.  If you need to sit down while you are in the shower, use a non-slip stool.  Install grab bars by the toilet and in the bathtub and shower. Do not use towel bars as grab bars.  What can I do in the bedroom?  Make sure that you have a light by your bed that is easy to reach.  Do not use any sheets or blankets on your bed that hang to the floor.  Have a firm bench or chair with side arms that you can use for support when you get dressed.  What can I do in  the kitchen?  Clean up any spills right away.  If you need to reach something above you, use a sturdy step stool that has a grab bar.  Keep electrical cables out of the way.  Do not use floor polish or wax that makes floors slippery.  What can I do with my stairs?  Do not leave anything on the stairs.  Make sure that you have a light switch at the top and the bottom of the stairs. Have them installed if you do not have them.  Make sure that there are handrails on both sides of the stairs. Fix handrails that are broken or loose. Make sure that handrails are as long as the staircases.  Install non-slip stair treads on all stairs in your home if they do not have carpet.  Avoid having throw rugs at the top or bottom of stairs, or secure the rugs with carpet tape to prevent them from moving.  Choose a carpet design that does not hide the edge of steps on the stairs. Make sure that carpet is firmly attached to the stairs. Fix any carpet that is loose or worn.  What can I do on the outside of my home?  Use bright outdoor lighting.  Repair the edges of walkways and driveways and fix any cracks. Clear paths of anything that can make you trip, such as tools or rocks.  Add color or contrast paint or tape to clearly jackson and help you see high doorway thresholds.  Trim any bushes or trees on the main path into your home.  Check that handrails are securely fastened and in good repair. Both sides of all steps should have handrails.  Install guardrails along the edges of any raised decks or porches.  Have leaves, snow, and ice cleared regularly. Use sand, salt, or ice melt on walkways during winter months if you live where there is ice and snow.  In the garage, clean up any spills right away, including grease or oil spills.  What other actions can I take?  Review your medicines with your health care provider. Some medicines can make you confused or feel dizzy. This can increase your chance of falling.  Wear closed-toe shoes that  fit well and support your feet. Wear shoes that have rubber soles and low heels.  Use a cane, walker, scooter, or crutches that help you move around if needed.  Talk with your provider about other ways that you can decrease your risk of falls. This may include seeing a physical therapist to learn to do exercises to improve movement and strength.  Where to find more information  Centers for Disease Control and Prevention, DINA: cdc.gov  National [place] on Aging: sarah.nih.gov  National [place] on Aging: sarah.nih.gov  Contact a health care provider if:  You are afraid of falling at home.  You feel weak, drowsy, or dizzy at home.  You fall at home.  Get help right away if you:  Lose consciousness or have trouble moving after a fall.  Have a fall that causes a head injury.  These symptoms may be an emergency. Get help right away. Call 911.  Do not wait to see if the symptoms will go away.  Do not drive yourself to the hospital.  This information is not intended to replace advice given to you by your health care provider. Make sure you discuss any questions you have with your health care provider.  Document Revised: 08/21/2023 Document Reviewed: 08/21/2023  Elsevier Patient Education © 2024 [employer]

## 2025-05-02 NOTE — Clinical Note
Verify with Katy that she did get RSV vaccine in September 2024 - she also got a TDaP there but it is not pulling over.

## 2025-05-02 NOTE — PROGRESS NOTES
Subjective   The ABCs of the Annual Wellness Visit  Medicare Wellness Visit      Cristiane Bishop is a 84 y.o. patient who presents for a Medicare Wellness Visit.    The following portions of the patient's history were reviewed and updated as appropriate: allergies, current medications, past family history, past medical history, past social history, past surgical history, and problem list.    Compared to one year ago, the patient's physical health is the same.    Compared to one year ago, the patient's mental health is the same.    Recent Hospitalizations:  She was not admitted to the hospital during the last year.     Current Medical Providers:  Patient Care Team:  Azalea Abdullahi APRN as PCP - General (Nurse Practitioner)  Collette Mann APRN as Nurse Practitioner (Nurse Practitioner)  Rafita Pemberton MD as Consulting Physician (Hematology and Oncology)  Norman Damon MD as Consulting Physician (Gastroenterology)    Outpatient Medications Prior to Visit   Medication Sig Dispense Refill    Calcium Carb-Cholecalciferol (Calcium 600+D3) 600-200 MG-UNIT tablet Take 1 tablet by mouth Daily. Last dose 1 month ago      ELDERBERRY PO Take  by mouth.      ferrous gluconate (FERGON) 324 MG tablet Take 1 tablet by mouth Daily With Breakfast. 30 tablet 3    loratadine (CLARITIN) 10 MG tablet Take 1 tablet by mouth Every Night.      Magnesium 100 MG tablet Take 100 mg by mouth Daily.      Menaquinone-7 (VITAMIN K2 PO) Take  by mouth.      psyllium (METAMUCIL) 58.6 % packet Take 1 packet by mouth Daily.      aspirin 81 MG EC tablet Take 1 tablet by mouth Daily. 90 tablet 3    atorvastatin (LIPITOR) 20 MG tablet Take 1 tablet by mouth Daily. 90 tablet 1    lisinopril (PRINIVIL,ZESTRIL) 20 MG tablet TAKE 1 TABLET BY MOUTH DAILY 90 tablet 0    omeprazole (priLOSEC) 40 MG capsule TAKE 1 CAPSULE BY MOUTH DAILY FOR GERD 30 capsule 0    PARoxetine (PAXIL) 20 MG tablet Take 1 tablet by mouth Every Night. 90  "tablet 0    Docusate Calcium (STOOL SOFTENER PO) Take  by mouth. (Patient not taking: Reported on 5/2/2025)       No facility-administered medications prior to visit.     No opioid medication identified on active medication list. I have reviewed chart for other potential  high risk medication/s and harmful drug interactions in the elderly.      Aspirin is on active medication list. Aspirin use is indicated based on review of current medical condition/s. Pros and cons of this therapy have been discussed today. Benefits of this medication outweigh potential harm.  Patient has been encouraged to continue taking this medication.        Patient Active Problem List   Diagnosis    Breast cancer in female    Heel pain    Allergic rhinitis    Esophageal reflux    Heel spur    History of breast cancer    Essential hypertension    HTN (hypertension)    Ingrown toenail    Depression    Mood disorder    Osteopenia    Bacterial infection due to H. pylori    Iron deficiency anemia due to chronic blood loss    Heartburn    Abnormal CT scan, stomach     Advance Care Planning Advance Directive is on file.  ACP discussion was held with the patient during this visit. Patient has an advance directive in EMR which is still valid.             Objective   Vitals:    05/02/25 0753   BP: 130/78   Pulse: 74   Temp: 97.7 °F (36.5 °C)   SpO2: 99%   Weight: 56.7 kg (125 lb)   Height: 147.3 cm (58\")   PainSc: 5    PainLoc: Hip       Estimated body mass index is 26.13 kg/m² as calculated from the following:    Height as of this encounter: 147.3 cm (58\").    Weight as of this encounter: 56.7 kg (125 lb).    BMI is >= 25 and <30. (Overweight) The following options were offered after discussion;: information on healthy weight added to patient's after visit summary            Does the patient have evidence of cognitive impairment? No                                                                                                Health  Risk " Assessment    Smoking Status:  Social History     Tobacco Use   Smoking Status Never   Smokeless Tobacco Never     Alcohol Consumption:  Social History     Substance and Sexual Activity   Alcohol Use Never       Fall Risk Screen  STEADI Fall Risk Assessment was completed, and patient is at MODERATE risk for falls. Assessment completed on:2025    Depression Screening   Little interest or pleasure in doing things? Not at all   Feeling down, depressed, or hopeless? Not at all   PHQ-2 Total Score 0      Health Habits and Functional and Cognitive Screenin/2/2025     7:55 AM   Functional & Cognitive Status   Do you have difficulty preparing food and eating? No   Do you have difficulty bathing yourself, getting dressed or grooming yourself? No   Do you have difficulty using the toilet? No   Do you have difficulty moving around from place to place? No   Do you have trouble with steps or getting out of a bed or a chair? No   Current Diet Well Balanced Diet   Dental Exam Up to date   Eye Exam Up to date   Exercise (times per week) 0 times per week   Current Exercises Include No Regular Exercise   Do you need help using the phone?  No   Are you deaf or do you have serious difficulty hearing?  Yes   Do you need help to go to places out of walking distance? No   Do you need help shopping? No   Do you need help preparing meals?  No   Do you need help with housework?  No   Do you need help with laundry? No   Do you need help taking your medications? No   Do you need help managing money? No   Do you ever drive or ride in a car without wearing a seat belt? No   Have you felt unusual stress, anger or loneliness in the last month? No   Who do you live with? Child   If you need help, do you have trouble finding someone available to you? No   Have you been bothered in the last four weeks by sexual problems? No   Do you have difficulty concentrating, remembering or making decisions? No           Age-appropriate Screening  Schedule:  Refer to the list below for future screening recommendations based on patient's age, sex and/or medical conditions. Orders for these recommended tests are listed in the plan section. The patient has been provided with a written plan.    Health Maintenance List  Health Maintenance   Topic Date Due    TDAP/TD VACCINES (3 - Td or Tdap) 09/15/2024    COVID-19 Vaccine (8 - 2024-25 season) 03/19/2025    INFLUENZA VACCINE  07/01/2025    ANNUAL WELLNESS VISIT  05/02/2026    DXA SCAN  09/17/2026    RSV Vaccine - Adults  Completed    Pneumococcal Vaccine 50+  Completed    ZOSTER VACCINE  Completed    MAMMOGRAM  Discontinued                                                                                                                                                CMS Preventative Services Quick Reference  Risk Factors Identified During Encounter    Depression/Dysphoria: Current medication is effective, no change recommended  Fall Risk-High or Moderate: Information on Fall Prevention Shared in After Visit Summary  Inactivity/Sedentary: Patient was advised to exercise at least 150 minutes a week per CDC recommendations.  Dental Screening Recommended  Vision Screening Recommended    The above risks/problems have been discussed with the patient.    Pertinent information has been shared with the patient in the After Visit Summary.    An After Visit Summary and PPPS were made available to the patient.    Follow Up:     Next Medicare Wellness visit to be scheduled in 1 year.          Additional E&M Note during same encounter follows:  Patient has multiple medical problems which are significant and separately identifiable that require additional work above and beyond the Medicare Wellness Visit.      Chief Complaint  Medicare Wellness-subsequent and Hypertension (Refills )    Cristiane Bishop is a 84 y.o. female who presents to [facility]     History of Present Illness  The patient is  "an 84-year-old female who presents to the office today for a Medicare annual wellness visit, as well as follow-up on chronic health conditions and medication refills.    No significant changes in physical or mental health are reported compared to the previous year. Hospitalization has not been required within the past year. Hematology care is continued under Dr. Pemberton, but Gastroenterology consultations have ceased. No dysphagia, nausea, vomiting, abdominal pain, or melena are experienced. Weight has remained stable, and no falls or injuries have occurred. Recently acquired hearing aids have been beneficial. Three tick bites were experienced this year, which were brief but resulted in small, itchy spots that are currently healing. A bruise from a recent minor trauma is also reported.    Reflux symptoms are well-managed at present.    Iron supplementation prescribed by Dr. Pemberton has resulted in black stools.    Previous gum issues with Prolia are noted, and calcium and vitamin D supplements are currently taken.    Atorvastatin is taken without significant adverse effects, although occasional leg cramps are experienced.    Paxil is taken for anxiety and depression, which is reported as effective.    Blood pressure is normotensive on exam today, 130/78.  No chest pain, palpitations, headaches, dizziness, or shortness of breath.  No lower extremity edema.    SOCIAL HISTORY  She does not smoke or drink alcohol.    FAMILY HISTORY  She does not have a family history of heart disease.    Objective   Vital Signs:   Vitals:    05/02/25 0753   BP: 130/78   Pulse: 74   Temp: 97.7 °F (36.5 °C)   SpO2: 99%   Weight: 56.7 kg (125 lb)   Height: 147.3 cm (58\")   PainSc: 5    PainLoc: Hip       Wt Readings from Last 3 Encounters:   05/02/25 56.7 kg (125 lb)   03/21/25 57.4 kg (126 lb 9.6 oz)   11/01/24 54.4 kg (120 lb)     BP Readings from Last 3 Encounters:   05/02/25 130/78   03/21/25 153/79   11/01/24 148/70       Physical " Exam  Vitals reviewed.   Constitutional:       General: She is not in acute distress.     Appearance: She is well-developed and overweight. She is not ill-appearing.   HENT:      Head: Normocephalic and atraumatic.   Eyes:      General: No scleral icterus.        Right eye: No discharge.         Left eye: No discharge.      Extraocular Movements: Extraocular movements intact.      Conjunctiva/sclera: Conjunctivae normal.   Neck:      Thyroid: No thyromegaly.      Vascular: No carotid bruit.      Trachea: Trachea normal.   Cardiovascular:      Rate and Rhythm: Normal rate and regular rhythm.      Pulses: Normal pulses.      Heart sounds: No murmur heard.  Pulmonary:      Effort: Pulmonary effort is normal.      Breath sounds: Normal breath sounds. No wheezing, rhonchi or rales.   Musculoskeletal:         General: Normal range of motion.      Cervical back: Normal range of motion and neck supple. No tenderness.      Right lower leg: No edema.      Left lower leg: No edema.   Lymphadenopathy:      Cervical: No cervical adenopathy.   Skin:     General: Skin is warm and dry.   Neurological:      Mental Status: She is alert and oriented to person, place, and time.   Psychiatric:         Mood and Affect: Mood and affect normal.         Behavior: Behavior normal.         Thought Content: Thought content normal.         Judgment: Judgment normal.           The following data was reviewed by ORLIN Denson on 05/02/2025:    UPPER GI ENDOSCOPY (01/17/2022 08:32)   Tissue Pathology Exam (01/17/2022 08:50)     Common labs          7/22/2024    13:25 9/20/2024    12:19 11/1/2024    11:13   Common Labs   Glucose  76  98    BUN  21  18    Creatinine  1.01  1.05    Sodium  135  131    Potassium  4.2  4.7    Chloride  100  94    Calcium  9.1  9.7    Albumin  4.0  4.4    Total Bilirubin  0.6  0.6    Alkaline Phosphatase  82  94    AST (SGOT)  21  23    ALT (SGPT)  13  12    WBC 4.72  6.93     Hemoglobin 10.2  10.7      Hematocrit 31.6  33.2     Platelets 211  209     Total Cholesterol   143    Triglycerides   106    HDL Cholesterol   55    LDL Cholesterol    69    Microalbumin, Urine   2.3      Magnesium (11/01/2024 11:13)  Phosphorus (11/01/2024 11:13)  Vitamin D,25-Hydroxy (11/01/2024 11:13)  Iron Profile (03/18/2025 09:23)  Ferritin (03/18/2025 09:23)    Mammo Screening Modified With Tomosynthesis Left With CAD (09/17/2024 09:37)  DEXA Bone Density Axial (09/17/2024 10:12)  Office Visit with Rafita Pemberton MD (03/21/2025)     Assessment & Plan   Diagnoses and all orders for this visit:    1. Medicare annual wellness visit, subsequent (Primary)    2. At moderate risk for fall    3. White coat syndrome with diagnosis of hypertension  -     lisinopril (PRINIVIL,ZESTRIL) 20 MG tablet; Take 1 tablet by mouth Daily.  Dispense: 90 tablet; Refill: 1    4. Essential hypertension  -     aspirin 81 MG EC tablet; Take 1 tablet by mouth Daily.  -     CBC Auto Differential  -     Comprehensive Metabolic Panel  -     Lipid Panel  -     TSH+Free T4  -     Microalbumin / Creatinine Urine Ratio - Urine, Clean Catch    5. Dyslipidemia  -     atorvastatin (LIPITOR) 20 MG tablet; Take 1 tablet by mouth Daily.  Dispense: 90 tablet; Refill: 1  -     Comprehensive Metabolic Panel  -     Lipid Panel    6. Gastroesophageal reflux disease, unspecified whether esophagitis present  -     omeprazole (priLOSEC) 40 MG capsule; Take 1 capsule by mouth Daily.  Dispense: 90 capsule; Refill: 1    7. Anxiety and depression  -     PARoxetine (PAXIL) 20 MG tablet; Take 1 tablet by mouth Every Night.  Dispense: 90 tablet; Refill: 1        Assessment & Plan  1. Medicare annual wellness visit.  - Weight has remained stable at 125 pounds since 04/2024.  - Classified as a moderate fall risk.   - Depression screening yielded negative results.  - Advanced directive living will on file remains valid.   - Advised to monitor tick bites for any changes.   - Comprehensive  metabolic panel (CMP), complete blood count (CBC), liver function tests, kidney function tests, electrolyte panel, hemoglobin, hematocrit, thyroid function tests, and cholesterol panel will be ordered. - - All medications will be refilled at [facility].    2. Hiatal hernia with esophagitis.  - Last upper endoscopy in 01/2022 revealed a medium-sized hiatal hernia and LA grade C esophagitis.  - Pathology consistent with ulcer but negative for intestinal metaplasia, dysplasia, or malignancy.  - Currently taking Protonix daily. No new symptoms reported.  - No trouble with swallowing, nausea, vomiting, abdominal pain, or heartburn/reflux.    3. Iron deficiency anemia.  - On iron supplementation prescribed by Dr. Pemberton.  - Reports black stools due to iron intake. No new symptoms reported.  - Iron and ferritin levels monitored by hematology.    4. Osteoporosis.  - Had side effects from Prolia in the past and is not currently on osteoporosis medication.  - Taking calcium and vitamin D supplements.  - Mammogram and DEXA scan done in 09/2024; mammogram was normal.    5. Hyperlipidemia.  - Currently taking atorvastatin.  - Reports occasional muscle cramps in legs but not consistent with medication intake.  - Lipid panel will be ordered.    6. Anxiety and depression.  - Taking Paxil and reports it is working well.  - Depression screening is negative.    Follow-up  - Follow up in 6 months.       BMI is >= 25 and <30. (Overweight) The following options were offered after discussion;: information on healthy weight added to patient's after visit summary        FOLLOW UP  Return in about 6 months (around 11/2/2025) for Next scheduled follow up, medication refills and fasting labs.  Patient was given instructions and counseling regarding her condition or for health maintenance advice. Please see specific information pulled into the AVS if appropriate.     Patient or patient representative verbalized consent for the use of Ambient  Listening during the visit with  ORLIN Denson for chart documentation. 5/2/2025  08:32 EDT    ORLIN Denson  05/02/25  09:14 EDT

## 2025-05-02 NOTE — PROGRESS NOTES
..  Venipuncture Blood Specimen Collection  Venipuncture performed in LT arm by Tejal Blakely MA with good hemostasis. Patient tolerated the procedure well without complications.   05/02/25   Tejal Blakely MA

## 2025-06-06 DIAGNOSIS — M25.552 LEFT HIP PAIN: ICD-10-CM

## (undated) DEVICE — Device: Brand: DEFENDO AIR/WATER/SUCTION AND BIOPSY VALVE

## (undated) DEVICE — SINGLE-USE BIOPSY FORCEPS: Brand: RADIAL JAW 4

## (undated) DEVICE — EGD OR ERCP KIT: Brand: MEDLINE INDUSTRIES, INC.

## (undated) DEVICE — SOL IRRG H2O PL/BG 1000ML STRL

## (undated) DEVICE — COLON KIT: Brand: MEDLINE INDUSTRIES, INC.